# Patient Record
Sex: FEMALE | Race: BLACK OR AFRICAN AMERICAN | Employment: OTHER | URBAN - METROPOLITAN AREA
[De-identification: names, ages, dates, MRNs, and addresses within clinical notes are randomized per-mention and may not be internally consistent; named-entity substitution may affect disease eponyms.]

---

## 2017-12-08 ENCOUNTER — TELEPHONE (OUTPATIENT)
Dept: ORTHOPEDIC SURGERY | Facility: CLINIC | Age: 82
End: 2017-12-08

## 2017-12-08 NOTE — TELEPHONE ENCOUNTER
Patient called stating someone called her earlier telling her that we don't have a referral for her, and she called her PCP and they told her they have sent one over and to call us again. She is requesting a call back from whoever called her today, she does not know the person's name. Please advise patient at [phone].

## 2017-12-11 ENCOUNTER — OFFICE VISIT (OUTPATIENT)
Dept: ORTHOPEDIC SURGERY | Facility: CLINIC | Age: 82
End: 2017-12-11

## 2017-12-11 VITALS
WEIGHT: 223.4 LBS | SYSTOLIC BLOOD PRESSURE: 176 MMHG | HEART RATE: 89 BPM | BODY MASS INDEX: 38.14 KG/M2 | RESPIRATION RATE: 18 BRPM | DIASTOLIC BLOOD PRESSURE: 90 MMHG | TEMPERATURE: 96.2 F | HEIGHT: 64 IN | OXYGEN SATURATION: 98 %

## 2017-12-11 DIAGNOSIS — M17.12 OSTEOARTHRITIS OF LEFT KNEE, UNSPECIFIED OSTEOARTHRITIS TYPE: ICD-10-CM

## 2017-12-11 DIAGNOSIS — M25.562 LEFT KNEE PAIN, UNSPECIFIED CHRONICITY: Primary | ICD-10-CM

## 2017-12-11 RX ORDER — TRIAMCINOLONE ACETONIDE 40 MG/ML
40 INJECTION, SUSPENSION INTRA-ARTICULAR; INTRAMUSCULAR ONCE
Qty: 1 ML | Refills: 0
Start: 2017-12-11 | End: 2017-12-11

## 2017-12-11 NOTE — PROGRESS NOTES
HISTORY OF PRESENT ILLNESS:  Michelle Dowd is a pleasant, 51-year-old, obese,  female who presents to the office. She has not been seen in over three years. She is complaining of bilateral knee pain, left greater than right. She has had cortisone injections in the past and did fair with them. She would like a cortisone injection to the left knee today. She sees Dr. Betty Kong, and a recent note reviewed from December 11, 2017, reveals her care is continuing, generally unremarkable, with exception of her type 2, insulin-dependent diabetes. She is running blood sugars around 110-120 range. There were no laboratory results available for review at the time of her visit with Dr. Cynthia Gordon. The patient does use a single-post cane for ambulation assistance primarily on the right. REVIEW OF SYSTEMS:  No chest pain. She does have exertional dyspnea. No fevers, chills, or night sweats. No rash and no itching. No nausea and no vomiting. She is an insulin-dependent diabetic, as mentioned previously. She has no allergies to Betadine. PHYSICAL EXAM:  She is a healthy-appearing, well-developed, well-nourished, pleasant, 80year-old, obese,  female, atraumatic, normocephalic, alert and oriented times three, sitting on the table comfortably. The left knee reveals crepitation through ranging with the patella tracking midline. She stands with a severe varus deformity. She has active range of motion with pain noted at 105-10° today. There is no calf tenderness or evidence of DVT. Distal sensation is intact fully to the left lower extremity. RADIOGRAPHS:  X-rays, AP standing, and a left knee lateral reveal severe end-staged arthritis, tricompartmental osteoarthritis of the left knee. There is also on AP, severe right knee osteoarthritis both of the medial and lateral joint compartments greater in the medial joint compartment, however. IMPRESSION:      1.  Left knee pain.  2. Decreased range of motion of the left knee secondary to above. 3. Severe tricompartmental end-stage osteoarthritis of the left knee. 4. Right knee osteoarthritis. PROCEDURE:  Using sterile technique, after informed verbal and written consent were obtained and time out performed, 1 cc of Kenalog at 40 mg per ml mixed with 8 mL of Marcaine 0.25% was injected to the left knee using the anterolateral intraarticular approach. There were no complications. The patient tolerated the procedure well. PLAN:   I am currently recommending a low-dose cortisone injection for her left knee pain. Today, we discussed the possibility of a left and/or right total knee replacement. She said she would consider. She is generally healthy with exception of her insulin-dependent diabetes, which is stable. The patient is going to follow back with our office on a prn basis. We discussed the knee replacement surgery that she may benefit from. I am going to go ahead and refer her to Dr. Mare Smith for continuation of care and decision for possible surgery. All her questions were answered to her satisfaction today.

## 2018-06-20 ENCOUNTER — HOSPITAL ENCOUNTER (OUTPATIENT)
Dept: MAMMOGRAPHY | Age: 83
Discharge: HOME | End: 2018-06-20
Attending: INTERNAL MEDICINE
Payer: MEDICARE

## 2018-06-20 DIAGNOSIS — Z12.31 VISIT FOR SCREENING MAMMOGRAM: ICD-10-CM

## 2018-06-20 PROCEDURE — 77063 BREAST TOMOSYNTHESIS BI: CPT

## 2019-03-29 ENCOUNTER — HOSPITAL ENCOUNTER (OUTPATIENT)
Dept: ULTRASOUND IMAGING | Age: 84
Discharge: HOME | End: 2019-03-29
Attending: FAMILY MEDICINE
Payer: MEDICARE

## 2019-03-29 DIAGNOSIS — B18.2 HEPATITIS C CARRIER (HCC): ICD-10-CM

## 2019-03-29 PROCEDURE — 76705 ECHO EXAM OF ABDOMEN: CPT

## 2020-02-06 ENCOUNTER — OFFICE VISIT (OUTPATIENT)
Dept: ORTHOPEDIC SURGERY | Facility: CLINIC | Age: 85
End: 2020-02-06

## 2020-02-06 VITALS
SYSTOLIC BLOOD PRESSURE: 169 MMHG | DIASTOLIC BLOOD PRESSURE: 79 MMHG | WEIGHT: 212.6 LBS | OXYGEN SATURATION: 100 % | RESPIRATION RATE: 18 BRPM | TEMPERATURE: 97.1 F | HEIGHT: 64 IN | BODY MASS INDEX: 36.29 KG/M2 | HEART RATE: 60 BPM

## 2020-02-06 DIAGNOSIS — M17.12 PRIMARY OSTEOARTHRITIS OF LEFT KNEE: ICD-10-CM

## 2020-02-06 DIAGNOSIS — M25.562 PAIN IN BOTH KNEES, UNSPECIFIED CHRONICITY: Primary | ICD-10-CM

## 2020-02-06 DIAGNOSIS — M25.561 PAIN IN BOTH KNEES, UNSPECIFIED CHRONICITY: Primary | ICD-10-CM

## 2020-02-06 PROBLEM — E66.01 SEVERE OBESITY (HCC): Status: ACTIVE | Noted: 2020-02-06

## 2020-02-06 RX ORDER — TRIAMCINOLONE ACETONIDE 40 MG/ML
40 INJECTION, SUSPENSION INTRA-ARTICULAR; INTRAMUSCULAR ONCE
Qty: 1 ML | Refills: 0
Start: 2020-02-06 | End: 2020-02-06

## 2020-02-06 NOTE — PROGRESS NOTES
HISTORY OF PRESENT ILLNESS:  Mynor Busby is a pleasant, 80-year-old, obese,  female who presents to the office. She has not been seen in over two years. She is complaining of bilateral knee pain, left greater than right. She has had cortisone injections in the past and did fair with them. She would like a cortisone injection to the left knee today. .  The patient does use a single-post cane for ambulation assistance primarily on the right. REVIEW OF SYSTEMS:  No chest pain. She does have exertional dyspnea. No fevers, chills, or night sweats. No rash and no itching. No nausea and no vomiting. She is an insulin-dependent diabetic, as mentioned previously. She has no allergies to Betadine, but is allergic to IVP dye. PHYSICAL EXAM:  She is a healthy-appearing, well-developed, well-nourished, pleasant, 80year-old, obese,  female, atraumatic, normocephalic, alert and oriented times three, sitting on the table comfortably. The left knee reveals crepitation through ranging with the patella tracking midline. She stands with a severe varus deformity. She has active range of motion with pain noted at 105-10° today. Trace effusion There is no calf tenderness or evidence of DVT. Distal sensation is intact fully to the left lower extremity. RADIOGRAPHS:  X-rays, AP standing, and a left knee lateral reveal severe end-staged arthritis, tricompartmental osteoarthritis of the left knee. There is also on AP, severe right knee osteoarthritis both of the medial and lateral joint compartments greater in the medial joint compartment, however. IMPRESSION:   
 
1. Left knee pain with trace effusion 2. Decreased range of motion of the left knee secondary to above. 3. Severe tricompartmental end-stage osteoarthritis of the left knee. 4. Right knee osteoarthritis.   
 
PROCEDURE:  Using sterile technique, after informed verbal and written consent were obtained and time out performed, 1 cc of Kenalog at 40 mg per ml mixed with 8 mL of Marcaine 0.25% was injected to the left knee using the anterolateral intraarticular approach. There were no complications. The patient tolerated the procedure well. Please Note: The above patient was treated with the assistance of the [employer] Ultrasound device. Image(s) captured, saved, printed, and copied to chart. PLAN:   I am currently recommending a low-dose cortisone injection for her left knee pain. Today, we discussed the possibility of a left and/or right total knee replacement. She said she would consider. She is generally healthy with exception of her insulin-dependent diabetes, which is stable and her BMI is 36.49  The patient is going to follow back with our office on a prn basis. We discussed the knee replacement surgery that she may benefit from. I am going to go ahead and refer her to Dr. Kaden Goldstein for continuation of care and decision for possible surgery. All her questions were answered to her satisfaction today.

## 2020-03-02 ENCOUNTER — TELEPHONE (OUTPATIENT)
Dept: ORTHOPEDIC SURGERY | Facility: CLINIC | Age: 85
End: 2020-03-02

## 2020-03-02 NOTE — TELEPHONE ENCOUNTER
Patient called stating that she was seen on 2/6/2020 and received an injection but it did not last as long. She was unsure of if she needed to come in again for another injection.  Please advise patient at [phone]

## 2020-03-03 NOTE — TELEPHONE ENCOUNTER
Pt has only seen Ameya--per Kate Howell have pt come in this week to see him--spoke with pt and --pt to come in at 1:00 at [facility] location

## 2020-03-06 ENCOUNTER — OFFICE VISIT (OUTPATIENT)
Dept: ORTHOPEDIC SURGERY | Facility: CLINIC | Age: 85
End: 2020-03-06

## 2020-03-06 VITALS
SYSTOLIC BLOOD PRESSURE: 216 MMHG | TEMPERATURE: 97 F | WEIGHT: 214.8 LBS | OXYGEN SATURATION: 99 % | DIASTOLIC BLOOD PRESSURE: 98 MMHG | RESPIRATION RATE: 16 BRPM | BODY MASS INDEX: 36.67 KG/M2 | HEART RATE: 75 BPM | HEIGHT: 64 IN

## 2020-03-06 DIAGNOSIS — I10 MALIGNANT HYPERTENSION: Primary | ICD-10-CM

## 2020-03-06 NOTE — PROGRESS NOTES
1. Have you been to the ER, urgent care clinic since your last visit? Hospitalized since your last visit? No    2. Have you seen or consulted any other health care providers outside of the [address] [place] [address] since your last visit? Include any pap smears or colon screening.  No

## 2021-01-01 ENCOUNTER — APPOINTMENT (OUTPATIENT)
Dept: VASCULAR SURGERY | Age: 86
DRG: 291 | End: 2021-01-01
Attending: INTERNAL MEDICINE
Payer: MEDICARE

## 2021-01-01 ENCOUNTER — APPOINTMENT (OUTPATIENT)
Dept: CT IMAGING | Age: 86
End: 2021-01-01
Attending: EMERGENCY MEDICINE
Payer: MEDICARE

## 2021-01-01 ENCOUNTER — PATIENT OUTREACH (OUTPATIENT)
Dept: CASE MANAGEMENT | Age: 86
End: 2021-01-01

## 2021-01-01 ENCOUNTER — HOME CARE VISIT (OUTPATIENT)
Dept: HOSPICE | Facility: HOSPICE | Age: 86
End: 2021-01-01
Payer: MEDICARE

## 2021-01-01 ENCOUNTER — APPOINTMENT (OUTPATIENT)
Dept: GENERAL RADIOLOGY | Age: 86
End: 2021-01-01
Attending: EMERGENCY MEDICINE
Payer: MEDICARE

## 2021-01-01 ENCOUNTER — APPOINTMENT (OUTPATIENT)
Dept: NON INVASIVE DIAGNOSTICS | Age: 86
DRG: 308 | End: 2021-01-01
Attending: PHYSICIAN ASSISTANT
Payer: MEDICARE

## 2021-01-01 ENCOUNTER — HOSPITAL ENCOUNTER (OUTPATIENT)
Dept: LAB | Age: 86
Discharge: HOME | End: 2021-09-01

## 2021-01-01 ENCOUNTER — APPOINTMENT (OUTPATIENT)
Dept: GENERAL RADIOLOGY | Age: 86
DRG: 308 | End: 2021-01-01
Attending: STUDENT IN AN ORGANIZED HEALTH CARE EDUCATION/TRAINING PROGRAM
Payer: MEDICARE

## 2021-01-01 ENCOUNTER — OFFICE VISIT (OUTPATIENT)
Dept: CARDIOLOGY CLINIC | Age: 86
End: 2021-01-01
Payer: MEDICARE

## 2021-01-01 ENCOUNTER — HOSPITAL ENCOUNTER (OUTPATIENT)
Dept: LAB | Age: 86
Discharge: HOME | End: 2021-07-14
Payer: MEDICARE

## 2021-01-01 ENCOUNTER — APPOINTMENT (OUTPATIENT)
Dept: CT IMAGING | Age: 86
End: 2021-01-01
Attending: PHYSICIAN ASSISTANT
Payer: MEDICARE

## 2021-01-01 ENCOUNTER — HOSPITAL ENCOUNTER (OUTPATIENT)
Dept: LAB | Age: 86
Discharge: HOME | DRG: 308 | End: 2021-09-29
Payer: MEDICARE

## 2021-01-01 ENCOUNTER — APPOINTMENT (OUTPATIENT)
Dept: ULTRASOUND IMAGING | Age: 86
DRG: 291 | End: 2021-01-01
Attending: INTERNAL MEDICINE
Payer: MEDICARE

## 2021-01-01 ENCOUNTER — HOSPITAL ENCOUNTER (OUTPATIENT)
Dept: LAB | Age: 86
Discharge: HOME | End: 2021-08-20

## 2021-01-01 ENCOUNTER — HOSPITAL ENCOUNTER (INPATIENT)
Age: 86
LOS: 10 days | Discharge: NURSING, REHAB OR LONG-TERM CARE | DRG: 291 | End: 2021-07-13
Attending: EMERGENCY MEDICINE | Admitting: INTERNAL MEDICINE
Payer: MEDICARE

## 2021-01-01 ENCOUNTER — HOSPITAL ENCOUNTER (OUTPATIENT)
Dept: LAB | Age: 86
Discharge: HOME | End: 2021-09-15

## 2021-01-01 ENCOUNTER — HOME CARE VISIT (OUTPATIENT)
Dept: SCHEDULING | Facility: HOME HEALTH | Age: 86
End: 2021-01-01
Payer: MEDICARE

## 2021-01-01 ENCOUNTER — HOSPITAL ENCOUNTER (INPATIENT)
Age: 86
LOS: 7 days | Discharge: HOSPICE | DRG: 308 | End: 2021-10-06
Attending: STUDENT IN AN ORGANIZED HEALTH CARE EDUCATION/TRAINING PROGRAM | Admitting: EMERGENCY MEDICINE
Payer: MEDICARE

## 2021-01-01 ENCOUNTER — HOSPITAL ENCOUNTER (OUTPATIENT)
Dept: LAB | Age: 86
Discharge: HOME | End: 2021-09-21
Payer: MEDICARE

## 2021-01-01 ENCOUNTER — APPOINTMENT (OUTPATIENT)
Dept: GENERAL RADIOLOGY | Age: 86
DRG: 291 | End: 2021-01-01
Attending: EMERGENCY MEDICINE
Payer: MEDICARE

## 2021-01-01 ENCOUNTER — HOSPITAL ENCOUNTER (OUTPATIENT)
Dept: LAB | Age: 86
Discharge: HOME | End: 2021-08-25

## 2021-01-01 ENCOUNTER — HOSPITAL ENCOUNTER (OUTPATIENT)
Dept: LAB | Age: 86
Discharge: HOME | End: 2021-09-22

## 2021-01-01 ENCOUNTER — APPOINTMENT (OUTPATIENT)
Dept: CT IMAGING | Age: 86
DRG: 308 | End: 2021-01-01
Attending: STUDENT IN AN ORGANIZED HEALTH CARE EDUCATION/TRAINING PROGRAM
Payer: MEDICARE

## 2021-01-01 ENCOUNTER — HOSPITAL ENCOUNTER (OUTPATIENT)
Dept: LAB | Age: 86
Discharge: HOME | End: 2021-08-04
Payer: MEDICARE

## 2021-01-01 ENCOUNTER — HOSPICE ADMISSION (OUTPATIENT)
Dept: HOSPICE | Facility: HOSPICE | Age: 86
End: 2021-01-01
Payer: MEDICARE

## 2021-01-01 ENCOUNTER — HOSPITAL ENCOUNTER (OUTPATIENT)
Dept: LAB | Age: 86
Discharge: HOME | End: 2021-08-13

## 2021-01-01 ENCOUNTER — HOSPITAL ENCOUNTER (EMERGENCY)
Age: 86
Discharge: HOME | End: 2021-02-11
Attending: EMERGENCY MEDICINE
Payer: MEDICARE

## 2021-01-01 ENCOUNTER — HOSPITAL ENCOUNTER (OUTPATIENT)
Dept: LAB | Age: 86
Discharge: HOME | End: 2021-08-07

## 2021-01-01 ENCOUNTER — APPOINTMENT (OUTPATIENT)
Dept: NON INVASIVE DIAGNOSTICS | Age: 86
DRG: 291 | End: 2021-01-01
Attending: INTERNAL MEDICINE
Payer: MEDICARE

## 2021-01-01 ENCOUNTER — HOSPITAL ENCOUNTER (OUTPATIENT)
Dept: LAB | Age: 86
Discharge: HOME | End: 2021-09-08

## 2021-01-01 ENCOUNTER — APPOINTMENT (OUTPATIENT)
Dept: VASCULAR SURGERY | Age: 86
End: 2021-01-01
Attending: EMERGENCY MEDICINE
Payer: MEDICARE

## 2021-01-01 ENCOUNTER — HOSPITAL ENCOUNTER (EMERGENCY)
Age: 86
Discharge: HOME | End: 2021-06-29
Attending: EMERGENCY MEDICINE
Payer: MEDICARE

## 2021-01-01 ENCOUNTER — HOSPITAL ENCOUNTER (EMERGENCY)
Age: 86
Discharge: HOME | End: 2021-06-25
Attending: EMERGENCY MEDICINE
Payer: MEDICARE

## 2021-01-01 ENCOUNTER — APPOINTMENT (OUTPATIENT)
Dept: CT IMAGING | Age: 86
DRG: 308 | End: 2021-01-01
Attending: EMERGENCY MEDICINE
Payer: MEDICARE

## 2021-01-01 VITALS
OXYGEN SATURATION: 98 % | RESPIRATION RATE: 24 BRPM | HEIGHT: 64 IN | DIASTOLIC BLOOD PRESSURE: 138 MMHG | WEIGHT: 173 LBS | HEART RATE: 76 BPM | TEMPERATURE: 96.5 F | SYSTOLIC BLOOD PRESSURE: 216 MMHG | BODY MASS INDEX: 29.53 KG/M2

## 2021-01-01 VITALS
HEIGHT: 64 IN | RESPIRATION RATE: 16 BRPM | BODY MASS INDEX: 32.27 KG/M2 | DIASTOLIC BLOOD PRESSURE: 83 MMHG | OXYGEN SATURATION: 99 % | TEMPERATURE: 98.4 F | HEART RATE: 109 BPM | WEIGHT: 189 LBS | SYSTOLIC BLOOD PRESSURE: 147 MMHG

## 2021-01-01 VITALS
RESPIRATION RATE: 16 BRPM | HEIGHT: 64 IN | TEMPERATURE: 98.1 F | WEIGHT: 195 LBS | HEART RATE: 85 BPM | OXYGEN SATURATION: 99 % | BODY MASS INDEX: 33.29 KG/M2 | DIASTOLIC BLOOD PRESSURE: 136 MMHG | SYSTOLIC BLOOD PRESSURE: 185 MMHG

## 2021-01-01 VITALS — HEART RATE: 89 BPM | OXYGEN SATURATION: 100 %

## 2021-01-01 VITALS
WEIGHT: 173 LBS | HEART RATE: 80 BPM | HEIGHT: 64 IN | TEMPERATURE: 97.8 F | RESPIRATION RATE: 18 BRPM | OXYGEN SATURATION: 98 % | BODY MASS INDEX: 29.53 KG/M2 | DIASTOLIC BLOOD PRESSURE: 75 MMHG | SYSTOLIC BLOOD PRESSURE: 118 MMHG

## 2021-01-01 VITALS
SYSTOLIC BLOOD PRESSURE: 112 MMHG | DIASTOLIC BLOOD PRESSURE: 60 MMHG | HEART RATE: 79 BPM | OXYGEN SATURATION: 97 % | BODY MASS INDEX: 29.7 KG/M2 | HEIGHT: 64 IN

## 2021-01-01 VITALS
DIASTOLIC BLOOD PRESSURE: 76 MMHG | SYSTOLIC BLOOD PRESSURE: 128 MMHG | HEART RATE: 96 BPM | BODY MASS INDEX: 32.27 KG/M2 | TEMPERATURE: 98.2 F | OXYGEN SATURATION: 98 % | HEIGHT: 64 IN | RESPIRATION RATE: 16 BRPM | WEIGHT: 189 LBS

## 2021-01-01 VITALS
SYSTOLIC BLOOD PRESSURE: 207 MMHG | TEMPERATURE: 97 F | DIASTOLIC BLOOD PRESSURE: 125 MMHG | OXYGEN SATURATION: 99 % | RESPIRATION RATE: 16 BRPM | HEART RATE: 83 BPM

## 2021-01-01 VITALS
HEART RATE: 85 BPM | OXYGEN SATURATION: 95 % | SYSTOLIC BLOOD PRESSURE: 145 MMHG | RESPIRATION RATE: 16 BRPM | TEMPERATURE: 98.1 F | DIASTOLIC BLOOD PRESSURE: 83 MMHG

## 2021-01-01 VITALS — TEMPERATURE: 97.4 F | OXYGEN SATURATION: 100 % | HEART RATE: 88 BPM | RESPIRATION RATE: 18 BRPM

## 2021-01-01 DIAGNOSIS — N39.41 URGE INCONTINENCE: ICD-10-CM

## 2021-01-01 DIAGNOSIS — R79.89 ELEVATED SERUM CREATININE: ICD-10-CM

## 2021-01-01 DIAGNOSIS — I10 ESSENTIAL HYPERTENSION WITH GOAL BLOOD PRESSURE LESS THAN 140/90: Primary | ICD-10-CM

## 2021-01-01 DIAGNOSIS — Z71.89 GOALS OF CARE, COUNSELING/DISCUSSION: ICD-10-CM

## 2021-01-01 DIAGNOSIS — I16.0 HYPERTENSIVE URGENCY: ICD-10-CM

## 2021-01-01 DIAGNOSIS — R00.1 BRADYCARDIA: ICD-10-CM

## 2021-01-01 DIAGNOSIS — A41.9 SEPSIS WITH ENCEPHALOPATHY WITHOUT SEPTIC SHOCK, DUE TO UNSPECIFIED ORGANISM (HCC): Primary | ICD-10-CM

## 2021-01-01 DIAGNOSIS — E66.01 SEVERE OBESITY (HCC): ICD-10-CM

## 2021-01-01 DIAGNOSIS — M17.12 OSTEOARTHRITIS OF LEFT KNEE, UNSPECIFIED OSTEOARTHRITIS TYPE: Primary | ICD-10-CM

## 2021-01-01 DIAGNOSIS — R53.81 DEBILITY: ICD-10-CM

## 2021-01-01 DIAGNOSIS — S09.90XA CLOSED HEAD INJURY, INITIAL ENCOUNTER: Primary | ICD-10-CM

## 2021-01-01 DIAGNOSIS — E11.42 DM TYPE 2 WITH DIABETIC PERIPHERAL NEUROPATHY (HCC): ICD-10-CM

## 2021-01-01 DIAGNOSIS — R41.82 ALTERED MENTAL STATUS, UNSPECIFIED ALTERED MENTAL STATUS TYPE: ICD-10-CM

## 2021-01-01 DIAGNOSIS — Z51.5 COMFORT MEASURES ONLY STATUS: ICD-10-CM

## 2021-01-01 DIAGNOSIS — G93.40 SEPSIS WITH ENCEPHALOPATHY WITHOUT SEPTIC SHOCK, DUE TO UNSPECIFIED ORGANISM (HCC): Primary | ICD-10-CM

## 2021-01-01 DIAGNOSIS — N28.1 KIDNEY CYSTS: ICD-10-CM

## 2021-01-01 DIAGNOSIS — K59.00 CONSTIPATION, UNSPECIFIED CONSTIPATION TYPE: Primary | ICD-10-CM

## 2021-01-01 DIAGNOSIS — R40.0 SOMNOLENCE: ICD-10-CM

## 2021-01-01 DIAGNOSIS — R65.20 SEPSIS WITH ENCEPHALOPATHY WITHOUT SEPTIC SHOCK, DUE TO UNSPECIFIED ORGANISM (HCC): Primary | ICD-10-CM

## 2021-01-01 DIAGNOSIS — I10 HYPERTENSION, UNSPECIFIED TYPE: ICD-10-CM

## 2021-01-01 DIAGNOSIS — M25.562 ACUTE PAIN OF LEFT KNEE: ICD-10-CM

## 2021-01-01 DIAGNOSIS — I10 HTN (HYPERTENSION), MALIGNANT: Primary | ICD-10-CM

## 2021-01-01 DIAGNOSIS — T68.XXXA HYPOTHERMIA, INITIAL ENCOUNTER: ICD-10-CM

## 2021-01-01 LAB
ABDOMINAL PROX AORTA VEL: 83 CM/S
ACTH PLAS-MCNC: 4.9 PG/ML (ref 7.2–63.3)
ALBUMIN SERPL-MCNC: 2.6 G/DL (ref 3.4–5)
ALBUMIN SERPL-MCNC: 2.8 G/DL (ref 3.4–5)
ALBUMIN SERPL-MCNC: 3.2 G/DL (ref 3.4–5)
ALBUMIN/GLOB SERPL: 0.6 {RATIO} (ref 0.8–1.7)
ALBUMIN/GLOB SERPL: 0.6 {RATIO} (ref 0.8–1.7)
ALBUMIN/GLOB SERPL: 0.7 {RATIO} (ref 0.8–1.7)
ALBUMIN/GLOB SERPL: 0.8 {RATIO} (ref 0.8–1.7)
ALP SERPL-CCNC: 115 U/L (ref 45–117)
ALP SERPL-CCNC: 64 U/L (ref 45–117)
ALP SERPL-CCNC: 77 U/L (ref 45–117)
ALP SERPL-CCNC: 81 U/L (ref 45–117)
ALP SERPL-CCNC: 83 U/L (ref 45–117)
ALP SERPL-CCNC: 86 U/L (ref 45–117)
ALP SERPL-CCNC: 93 U/L (ref 45–117)
ALT SERPL-CCNC: 18 U/L (ref 13–56)
ALT SERPL-CCNC: 18 U/L (ref 13–56)
ALT SERPL-CCNC: 21 U/L (ref 13–56)
ALT SERPL-CCNC: 27 U/L (ref 13–56)
AMMONIA PLAS-SCNC: 21 UMOL/L (ref 11–32)
AMMONIA PLAS-SCNC: <10 UMOL/L (ref 11–32)
AMPHET UR QL SCN: NEGATIVE
ANION GAP BLD CALC-SCNC: 6 MMOL/L (ref 10–20)
ANION GAP BLD CALC-SCNC: 8 MMOL/L (ref 10–20)
ANION GAP SERPL CALC-SCNC: 3 MMOL/L (ref 3–18)
ANION GAP SERPL CALC-SCNC: 4 MMOL/L (ref 3–18)
ANION GAP SERPL CALC-SCNC: 5 MMOL/L (ref 3–18)
ANION GAP SERPL CALC-SCNC: 6 MMOL/L (ref 3–18)
ANION GAP SERPL CALC-SCNC: 7 MMOL/L (ref 3–18)
ANION GAP SERPL CALC-SCNC: 8 MMOL/L (ref 3–18)
ANION GAP SERPL CALC-SCNC: 9 MMOL/L (ref 3–18)
ANION GAP SERPL CALC-SCNC: 9 MMOL/L (ref 3–18)
APPEARANCE UR: ABNORMAL
APPEARANCE UR: CLEAR
ARTERIAL PATENCY WRIST A: POSITIVE
AST SERPL-CCNC: 18 U/L (ref 10–38)
AST SERPL-CCNC: 19 U/L (ref 10–38)
AST SERPL-CCNC: 19 U/L (ref 10–38)
AST SERPL-CCNC: 20 U/L (ref 10–38)
AST SERPL-CCNC: 22 U/L (ref 10–38)
ATRIAL RATE: 69 BPM
ATRIAL RATE: 78 BPM
ATRIAL RATE: 80 BPM
BACTERIA SPEC CULT: ABNORMAL
BACTERIA SPEC CULT: ABNORMAL
BACTERIA SPEC CULT: NORMAL
BACTERIA URNS QL MICRO: ABNORMAL /HPF
BARBITURATES UR QL SCN: NEGATIVE
BASE DEFICIT BLD-SCNC: 0.4 MMOL/L
BASE DEFICIT BLD-SCNC: 1.8 MMOL/L
BASOPHILS # BLD: 0 K/UL (ref 0–0.1)
BASOPHILS NFR BLD: 0 % (ref 0–2)
BASOPHILS NFR BLD: 1 % (ref 0–2)
BASOPHILS NFR BLD: 1 % (ref 0–2)
BDY SITE: ABNORMAL
BENZODIAZ UR QL: NEGATIVE
BILIRUB SERPL-MCNC: 0.3 MG/DL (ref 0.2–1)
BILIRUB SERPL-MCNC: 0.3 MG/DL (ref 0.2–1)
BILIRUB SERPL-MCNC: 0.4 MG/DL (ref 0.2–1)
BILIRUB SERPL-MCNC: 0.4 MG/DL (ref 0.2–1)
BILIRUB SERPL-MCNC: 0.5 MG/DL (ref 0.2–1)
BILIRUB SERPL-MCNC: 0.5 MG/DL (ref 0.2–1)
BILIRUB SERPL-MCNC: 0.6 MG/DL (ref 0.2–1)
BILIRUB UR QL: NEGATIVE
BNP SERPL-MCNC: 2329 PG/ML (ref 0–1800)
BNP SERPL-MCNC: 470 PG/ML (ref 0–1800)
BNP SERPL-MCNC: 526 PG/ML (ref 0–1800)
BODY TEMPERATURE: 97.8
BUN SERPL-MCNC: 20 MG/DL (ref 7–18)
BUN SERPL-MCNC: 21 MG/DL (ref 7–18)
BUN SERPL-MCNC: 21 MG/DL (ref 7–18)
BUN SERPL-MCNC: 23 MG/DL (ref 7–18)
BUN SERPL-MCNC: 23 MG/DL (ref 7–18)
BUN SERPL-MCNC: 24 MG/DL (ref 7–18)
BUN SERPL-MCNC: 25 MG/DL (ref 7–18)
BUN SERPL-MCNC: 26 MG/DL (ref 7–18)
BUN SERPL-MCNC: 27 MG/DL (ref 7–18)
BUN SERPL-MCNC: 30 MG/DL (ref 7–18)
BUN SERPL-MCNC: 31 MG/DL (ref 7–18)
BUN SERPL-MCNC: 35 MG/DL (ref 7–18)
BUN SERPL-MCNC: 35 MG/DL (ref 7–18)
BUN SERPL-MCNC: 36 MG/DL (ref 7–18)
BUN SERPL-MCNC: 41 MG/DL (ref 7–18)
BUN SERPL-MCNC: 43 MG/DL (ref 7–18)
BUN SERPL-MCNC: 45 MG/DL (ref 7–18)
BUN SERPL-MCNC: 45 MG/DL (ref 7–18)
BUN/CREAT SERPL: 13 (ref 12–20)
BUN/CREAT SERPL: 14 (ref 12–20)
BUN/CREAT SERPL: 15 (ref 12–20)
BUN/CREAT SERPL: 16 (ref 12–20)
BUN/CREAT SERPL: 16 (ref 12–20)
BUN/CREAT SERPL: 17 (ref 12–20)
BUN/CREAT SERPL: 19 (ref 12–20)
BUN/CREAT SERPL: 19 (ref 12–20)
BUN/CREAT SERPL: 20 (ref 12–20)
BUN/CREAT SERPL: 21 (ref 12–20)
BUN/CREAT SERPL: 22 (ref 12–20)
BUN/CREAT SERPL: 23 (ref 12–20)
CA-I BLD-MCNC: 1.26 MMOL/L (ref 1.12–1.32)
CA-I BLD-MCNC: 1.29 MMOL/L (ref 1.12–1.32)
CALCIUM SERPL-MCNC: 8.1 MG/DL (ref 8.5–10.1)
CALCIUM SERPL-MCNC: 8.2 MG/DL (ref 8.5–10.1)
CALCIUM SERPL-MCNC: 8.3 MG/DL (ref 8.5–10.1)
CALCIUM SERPL-MCNC: 8.3 MG/DL (ref 8.5–10.1)
CALCIUM SERPL-MCNC: 8.4 MG/DL (ref 8.5–10.1)
CALCIUM SERPL-MCNC: 8.4 MG/DL (ref 8.5–10.1)
CALCIUM SERPL-MCNC: 8.6 MG/DL (ref 8.5–10.1)
CALCIUM SERPL-MCNC: 8.7 MG/DL (ref 8.5–10.1)
CALCIUM SERPL-MCNC: 8.7 MG/DL (ref 8.5–10.1)
CALCIUM SERPL-MCNC: 8.8 MG/DL (ref 8.5–10.1)
CALCIUM SERPL-MCNC: 8.8 MG/DL (ref 8.5–10.1)
CALCIUM SERPL-MCNC: 8.9 MG/DL (ref 8.5–10.1)
CALCIUM SERPL-MCNC: 9.1 MG/DL (ref 8.5–10.1)
CALCIUM SERPL-MCNC: 9.3 MG/DL (ref 8.5–10.1)
CALCULATED P AXIS, ECG09: 61 DEGREES
CALCULATED P AXIS, ECG09: 75 DEGREES
CALCULATED P AXIS, ECG09: 77 DEGREES
CALCULATED R AXIS, ECG10: -18 DEGREES
CALCULATED R AXIS, ECG10: -24 DEGREES
CALCULATED R AXIS, ECG10: -28 DEGREES
CALCULATED T AXIS, ECG11: 67 DEGREES
CALCULATED T AXIS, ECG11: 77 DEGREES
CALCULATED T AXIS, ECG11: 98 DEGREES
CANNABINOIDS UR QL SCN: NEGATIVE
CC UR VC: ABNORMAL
CHLORIDE BLD-SCNC: 112 MMOL/L (ref 98–107)
CHLORIDE BLD-SCNC: 114 MMOL/L (ref 98–107)
CHLORIDE SERPL-SCNC: 103 MMOL/L (ref 100–111)
CHLORIDE SERPL-SCNC: 105 MMOL/L (ref 100–111)
CHLORIDE SERPL-SCNC: 106 MMOL/L (ref 100–111)
CHLORIDE SERPL-SCNC: 107 MMOL/L (ref 100–111)
CHLORIDE SERPL-SCNC: 108 MMOL/L (ref 100–111)
CHLORIDE SERPL-SCNC: 109 MMOL/L (ref 100–111)
CHLORIDE SERPL-SCNC: 109 MMOL/L (ref 100–111)
CHLORIDE SERPL-SCNC: 112 MMOL/L (ref 100–111)
CHLORIDE SERPL-SCNC: 115 MMOL/L (ref 100–111)
CHLORIDE SERPL-SCNC: 116 MMOL/L (ref 100–111)
CHLORIDE SERPL-SCNC: 116 MMOL/L (ref 100–111)
CHOLEST SERPL-MCNC: 188 MG/DL
CK MB CFR SERPL CALC: 5 % (ref 0–4)
CK MB CFR SERPL CALC: 5.3 % (ref 0–4)
CK MB CFR SERPL CALC: 5.3 % (ref 0–4)
CK MB SERPL-MCNC: 1.6 NG/ML (ref 5–25)
CK MB SERPL-MCNC: 2.1 NG/ML (ref 5–25)
CK MB SERPL-MCNC: 2.3 NG/ML (ref 5–25)
CK SERPL-CCNC: 30 U/L (ref 26–192)
CK SERPL-CCNC: 42 U/L (ref 26–192)
CK SERPL-CCNC: 43 U/L (ref 26–192)
CO2 BLD-SCNC: 24 MMOL/L (ref 19–24)
CO2 BLD-SCNC: 28 MMOL/L (ref 19–24)
CO2 SERPL-SCNC: 18 MMOL/L (ref 21–32)
CO2 SERPL-SCNC: 21 MMOL/L (ref 21–32)
CO2 SERPL-SCNC: 21 MMOL/L (ref 21–32)
CO2 SERPL-SCNC: 22 MMOL/L (ref 21–32)
CO2 SERPL-SCNC: 23 MMOL/L (ref 21–32)
CO2 SERPL-SCNC: 26 MMOL/L (ref 21–32)
CO2 SERPL-SCNC: 26 MMOL/L (ref 21–32)
CO2 SERPL-SCNC: 27 MMOL/L (ref 21–32)
CO2 SERPL-SCNC: 27 MMOL/L (ref 21–32)
CO2 SERPL-SCNC: 28 MMOL/L (ref 21–32)
COCAINE UR QL SCN: NEGATIVE
COLOR UR: YELLOW
CORTIS SERPL-MCNC: 15.1 UG/DL
COVID-19 RAPID TEST, COVR: NOT DETECTED
CREAT BLD-MCNC: 1.63 MG/DL (ref 0.6–1.3)
CREAT BLD-MCNC: 1.71 MG/DL (ref 0.6–1.3)
CREAT SERPL-MCNC: 1.34 MG/DL (ref 0.6–1.3)
CREAT SERPL-MCNC: 1.35 MG/DL (ref 0.6–1.3)
CREAT SERPL-MCNC: 1.39 MG/DL (ref 0.6–1.3)
CREAT SERPL-MCNC: 1.49 MG/DL (ref 0.6–1.3)
CREAT SERPL-MCNC: 1.51 MG/DL (ref 0.6–1.3)
CREAT SERPL-MCNC: 1.53 MG/DL (ref 0.6–1.3)
CREAT SERPL-MCNC: 1.6 MG/DL (ref 0.6–1.3)
CREAT SERPL-MCNC: 1.61 MG/DL (ref 0.6–1.3)
CREAT SERPL-MCNC: 1.62 MG/DL (ref 0.6–1.3)
CREAT SERPL-MCNC: 1.69 MG/DL (ref 0.6–1.3)
CREAT SERPL-MCNC: 1.79 MG/DL (ref 0.6–1.3)
CREAT SERPL-MCNC: 1.84 MG/DL (ref 0.6–1.3)
CREAT SERPL-MCNC: 2.09 MG/DL (ref 0.6–1.3)
CREAT SERPL-MCNC: 2.11 MG/DL (ref 0.6–1.3)
CREAT SERPL-MCNC: 2.32 MG/DL (ref 0.6–1.3)
CREAT SERPL-MCNC: 2.41 MG/DL (ref 0.6–1.3)
CREAT SERPL-MCNC: 2.47 MG/DL (ref 0.6–1.3)
CREAT SERPL-MCNC: 2.51 MG/DL (ref 0.6–1.3)
CRP SERPL-MCNC: 1.2 MG/DL (ref 0–0.3)
DIAGNOSIS, 93000: NORMAL
DIFFERENTIAL METHOD BLD: ABNORMAL
ECHO AO ROOT DIAM: 2.59 CM
ECHO LA AREA 4C: 17.92 CM2
ECHO LA MAJOR AXIS: 3.75 CM
ECHO LA MINOR AXIS: 1.98 CM
ECHO LA VOL 2C: 58.79 ML (ref 22–52)
ECHO LA VOL 4C: 49.21 ML (ref 22–52)
ECHO LA VOL BP: 57.55 ML (ref 22–52)
ECHO LA VOL/BSA BIPLANE: 30.45 ML/M2 (ref 16–28)
ECHO LA VOLUME INDEX A2C: 31.11 ML/M2 (ref 16–28)
ECHO LA VOLUME INDEX A4C: 26.04 ML/M2 (ref 16–28)
ECHO LV INTERNAL DIMENSION DIASTOLIC: 3.25 CM (ref 3.9–5.3)
ECHO LV INTERNAL DIMENSION DIASTOLIC: 4.25 CM (ref 3.9–5.3)
ECHO LV INTERNAL DIMENSION SYSTOLIC: 2.51 CM
ECHO LV INTERNAL DIMENSION SYSTOLIC: 2.99 CM
ECHO LV IVSD: 1.04 CM (ref 0.6–0.9)
ECHO LV IVSD: 1.3 CM (ref 0.6–0.9)
ECHO LV MASS 2D: 158.1 G (ref 67–162)
ECHO LV MASS 2D: 170.5 G (ref 67–162)
ECHO LV MASS INDEX 2D: 83.7 G/M2 (ref 43–95)
ECHO LV MASS INDEX 2D: 92.7 G/M2 (ref 43–95)
ECHO LV POSTERIOR WALL DIASTOLIC: 1.26 CM (ref 0.6–0.9)
ECHO LV POSTERIOR WALL DIASTOLIC: 1.52 CM (ref 0.6–0.9)
ECHO MV A VELOCITY: 63.65 CM/S
ECHO MV E DECELERATION TIME (DT): 142.64 MS
ECHO MV E VELOCITY: 50.59 CM/S
ECHO MV E/A RATIO: 0.79
ECHO TV REGURGITANT MAX VELOCITY: 218.07 CM/S
ECHO TV REGURGITANT PEAK GRADIENT: 19.03 MMHG
EOSINOPHIL # BLD: 0 K/UL (ref 0–0.4)
EOSINOPHIL # BLD: 0.1 K/UL (ref 0–0.4)
EOSINOPHIL # BLD: 0.2 K/UL (ref 0–0.4)
EOSINOPHIL # BLD: 0.2 K/UL (ref 0–0.4)
EOSINOPHIL NFR BLD: 0 % (ref 0–5)
EOSINOPHIL NFR BLD: 1 % (ref 0–5)
EOSINOPHIL NFR BLD: 2 % (ref 0–5)
EOSINOPHIL NFR BLD: 3 % (ref 0–5)
EOSINOPHIL NFR BLD: 3 % (ref 0–5)
EOSINOPHIL NFR BLD: 5 % (ref 0–5)
EPITH CASTS URNS QL MICRO: ABNORMAL /LPF (ref 0–5)
ERYTHROCYTE [DISTWIDTH] IN BLOOD BY AUTOMATED COUNT: 13.4 % (ref 11.6–14.5)
ERYTHROCYTE [DISTWIDTH] IN BLOOD BY AUTOMATED COUNT: 14 % (ref 11.6–14.5)
ERYTHROCYTE [DISTWIDTH] IN BLOOD BY AUTOMATED COUNT: 14.2 % (ref 11.6–14.5)
ERYTHROCYTE [DISTWIDTH] IN BLOOD BY AUTOMATED COUNT: 14.3 % (ref 11.6–14.5)
ERYTHROCYTE [DISTWIDTH] IN BLOOD BY AUTOMATED COUNT: 14.3 % (ref 11.6–14.5)
ERYTHROCYTE [DISTWIDTH] IN BLOOD BY AUTOMATED COUNT: 14.5 % (ref 11.6–14.5)
ERYTHROCYTE [DISTWIDTH] IN BLOOD BY AUTOMATED COUNT: 14.6 % (ref 11.6–14.5)
ERYTHROCYTE [DISTWIDTH] IN BLOOD BY AUTOMATED COUNT: 14.8 % (ref 11.6–14.5)
ERYTHROCYTE [DISTWIDTH] IN BLOOD BY AUTOMATED COUNT: 15.1 % (ref 11.6–14.5)
ERYTHROCYTE [DISTWIDTH] IN BLOOD BY AUTOMATED COUNT: 15.3 % (ref 11.6–14.5)
ERYTHROCYTE [DISTWIDTH] IN BLOOD BY AUTOMATED COUNT: 15.4 % (ref 11.6–14.5)
ERYTHROCYTE [SEDIMENTATION RATE] IN BLOOD: 31 MM/HR (ref 0–30)
EST. AVERAGE GLUCOSE BLD GHB EST-MCNC: 123 MG/DL
EST. AVERAGE GLUCOSE BLD GHB EST-MCNC: 126 MG/DL
GAS FLOW.O2 O2 DELIVERY SYS: ABNORMAL L/MIN
GLOBULIN SER CALC-MCNC: 3.5 G/DL (ref 2–4)
GLOBULIN SER CALC-MCNC: 4.1 G/DL (ref 2–4)
GLOBULIN SER CALC-MCNC: 4.3 G/DL (ref 2–4)
GLOBULIN SER CALC-MCNC: 4.4 G/DL (ref 2–4)
GLOBULIN SER CALC-MCNC: 4.5 G/DL (ref 2–4)
GLOBULIN SER CALC-MCNC: 4.7 G/DL (ref 2–4)
GLOBULIN SER CALC-MCNC: 5.2 G/DL (ref 2–4)
GLUCOSE BLD STRIP.AUTO-MCNC: 102 MG/DL (ref 70–110)
GLUCOSE BLD STRIP.AUTO-MCNC: 104 MG/DL (ref 70–110)
GLUCOSE BLD STRIP.AUTO-MCNC: 105 MG/DL (ref 70–110)
GLUCOSE BLD STRIP.AUTO-MCNC: 106 MG/DL (ref 70–110)
GLUCOSE BLD STRIP.AUTO-MCNC: 106 MG/DL (ref 70–110)
GLUCOSE BLD STRIP.AUTO-MCNC: 108 MG/DL (ref 70–110)
GLUCOSE BLD STRIP.AUTO-MCNC: 112 MG/DL (ref 70–110)
GLUCOSE BLD STRIP.AUTO-MCNC: 113 MG/DL (ref 70–110)
GLUCOSE BLD STRIP.AUTO-MCNC: 113 MG/DL (ref 70–110)
GLUCOSE BLD STRIP.AUTO-MCNC: 116 MG/DL (ref 70–110)
GLUCOSE BLD STRIP.AUTO-MCNC: 118 MG/DL (ref 70–110)
GLUCOSE BLD STRIP.AUTO-MCNC: 120 MG/DL (ref 70–110)
GLUCOSE BLD STRIP.AUTO-MCNC: 120 MG/DL (ref 70–110)
GLUCOSE BLD STRIP.AUTO-MCNC: 124 MG/DL (ref 70–110)
GLUCOSE BLD STRIP.AUTO-MCNC: 125 MG/DL (ref 70–110)
GLUCOSE BLD STRIP.AUTO-MCNC: 126 MG/DL (ref 70–110)
GLUCOSE BLD STRIP.AUTO-MCNC: 127 MG/DL (ref 70–110)
GLUCOSE BLD STRIP.AUTO-MCNC: 128 MG/DL (ref 70–110)
GLUCOSE BLD STRIP.AUTO-MCNC: 130 MG/DL (ref 70–110)
GLUCOSE BLD STRIP.AUTO-MCNC: 130 MG/DL (ref 70–110)
GLUCOSE BLD STRIP.AUTO-MCNC: 131 MG/DL (ref 70–110)
GLUCOSE BLD STRIP.AUTO-MCNC: 132 MG/DL (ref 70–110)
GLUCOSE BLD STRIP.AUTO-MCNC: 135 MG/DL (ref 70–110)
GLUCOSE BLD STRIP.AUTO-MCNC: 135 MG/DL (ref 70–110)
GLUCOSE BLD STRIP.AUTO-MCNC: 136 MG/DL (ref 70–110)
GLUCOSE BLD STRIP.AUTO-MCNC: 137 MG/DL (ref 70–110)
GLUCOSE BLD STRIP.AUTO-MCNC: 138 MG/DL (ref 70–110)
GLUCOSE BLD STRIP.AUTO-MCNC: 138 MG/DL (ref 70–110)
GLUCOSE BLD STRIP.AUTO-MCNC: 141 MG/DL (ref 70–110)
GLUCOSE BLD STRIP.AUTO-MCNC: 142 MG/DL (ref 70–110)
GLUCOSE BLD STRIP.AUTO-MCNC: 142 MG/DL (ref 70–110)
GLUCOSE BLD STRIP.AUTO-MCNC: 143 MG/DL (ref 70–110)
GLUCOSE BLD STRIP.AUTO-MCNC: 144 MG/DL (ref 70–110)
GLUCOSE BLD STRIP.AUTO-MCNC: 144 MG/DL (ref 70–110)
GLUCOSE BLD STRIP.AUTO-MCNC: 145 MG/DL (ref 70–110)
GLUCOSE BLD STRIP.AUTO-MCNC: 146 MG/DL (ref 70–110)
GLUCOSE BLD STRIP.AUTO-MCNC: 147 MG/DL (ref 70–110)
GLUCOSE BLD STRIP.AUTO-MCNC: 148 MG/DL (ref 70–110)
GLUCOSE BLD STRIP.AUTO-MCNC: 149 MG/DL (ref 70–110)
GLUCOSE BLD STRIP.AUTO-MCNC: 153 MG/DL (ref 70–110)
GLUCOSE BLD STRIP.AUTO-MCNC: 156 MG/DL (ref 70–110)
GLUCOSE BLD STRIP.AUTO-MCNC: 157 MG/DL (ref 70–110)
GLUCOSE BLD STRIP.AUTO-MCNC: 157 MG/DL (ref 70–110)
GLUCOSE BLD STRIP.AUTO-MCNC: 159 MG/DL (ref 70–110)
GLUCOSE BLD STRIP.AUTO-MCNC: 164 MG/DL (ref 70–110)
GLUCOSE BLD STRIP.AUTO-MCNC: 166 MG/DL (ref 70–110)
GLUCOSE BLD STRIP.AUTO-MCNC: 169 MG/DL (ref 70–110)
GLUCOSE BLD STRIP.AUTO-MCNC: 174 MG/DL (ref 70–110)
GLUCOSE BLD STRIP.AUTO-MCNC: 176 MG/DL (ref 70–110)
GLUCOSE BLD STRIP.AUTO-MCNC: 178 MG/DL (ref 70–110)
GLUCOSE BLD STRIP.AUTO-MCNC: 196 MG/DL (ref 70–110)
GLUCOSE BLD STRIP.AUTO-MCNC: 70 MG/DL (ref 70–110)
GLUCOSE BLD STRIP.AUTO-MCNC: 70 MG/DL (ref 70–110)
GLUCOSE BLD STRIP.AUTO-MCNC: 79 MG/DL (ref 70–110)
GLUCOSE BLD STRIP.AUTO-MCNC: 90 MG/DL (ref 70–110)
GLUCOSE BLD STRIP.AUTO-MCNC: 93 MG/DL (ref 70–110)
GLUCOSE BLD STRIP.AUTO-MCNC: 96 MG/DL (ref 70–110)
GLUCOSE BLD STRIP.AUTO-MCNC: 97 MG/DL (ref 70–110)
GLUCOSE BLD STRIP.AUTO-MCNC: 99 MG/DL (ref 70–110)
GLUCOSE BLD-MCNC: 79 MG/DL (ref 65–100)
GLUCOSE BLD-MCNC: 96 MG/DL (ref 65–100)
GLUCOSE SERPL-MCNC: 100 MG/DL (ref 74–99)
GLUCOSE SERPL-MCNC: 102 MG/DL (ref 74–99)
GLUCOSE SERPL-MCNC: 116 MG/DL (ref 74–99)
GLUCOSE SERPL-MCNC: 119 MG/DL (ref 74–99)
GLUCOSE SERPL-MCNC: 123 MG/DL (ref 74–99)
GLUCOSE SERPL-MCNC: 124 MG/DL (ref 74–99)
GLUCOSE SERPL-MCNC: 124 MG/DL (ref 74–99)
GLUCOSE SERPL-MCNC: 128 MG/DL (ref 74–99)
GLUCOSE SERPL-MCNC: 145 MG/DL (ref 74–99)
GLUCOSE SERPL-MCNC: 160 MG/DL (ref 74–99)
GLUCOSE SERPL-MCNC: 168 MG/DL (ref 74–99)
GLUCOSE SERPL-MCNC: 185 MG/DL (ref 74–99)
GLUCOSE SERPL-MCNC: 62 MG/DL (ref 74–99)
GLUCOSE SERPL-MCNC: 74 MG/DL (ref 74–99)
GLUCOSE SERPL-MCNC: 90 MG/DL (ref 74–99)
GLUCOSE SERPL-MCNC: 93 MG/DL (ref 74–99)
GLUCOSE SERPL-MCNC: 94 MG/DL (ref 74–99)
GLUCOSE SERPL-MCNC: 99 MG/DL (ref 74–99)
GLUCOSE UR STRIP.AUTO-MCNC: 100 MG/DL
GLUCOSE UR STRIP.AUTO-MCNC: 100 MG/DL
GLUCOSE UR STRIP.AUTO-MCNC: NEGATIVE MG/DL
GLUCOSE UR STRIP.AUTO-MCNC: NEGATIVE MG/DL
GRAM STN SPEC: ABNORMAL
GRAM STN SPEC: ABNORMAL
GRAN CASTS URNS QL MICRO: ABNORMAL /LPF
HBA1C MFR BLD: 5.9 % (ref 4.2–5.6)
HBA1C MFR BLD: 6 % (ref 4.2–5.6)
HCO3 BLD-SCNC: 23.6 MMOL/L (ref 22–26)
HCO3 BLD-SCNC: 27.2 MMOL/L (ref 22–26)
HCT VFR BLD AUTO: 31.2 % (ref 35–45)
HCT VFR BLD AUTO: 32.8 % (ref 35–45)
HCT VFR BLD AUTO: 34.2 % (ref 35–45)
HCT VFR BLD AUTO: 35 % (ref 35–45)
HCT VFR BLD AUTO: 35.2 % (ref 35–45)
HCT VFR BLD AUTO: 36.3 % (ref 35–45)
HCT VFR BLD AUTO: 36.7 % (ref 35–45)
HCT VFR BLD AUTO: 37.3 % (ref 35–45)
HCT VFR BLD AUTO: 37.5 % (ref 35–45)
HCT VFR BLD AUTO: 38.1 % (ref 35–45)
HCT VFR BLD AUTO: 39.2 % (ref 35–45)
HDLC SERPL-MCNC: 49 MG/DL (ref 40–60)
HDLC SERPL: 3.8 {RATIO} (ref 0–5)
HDSCOM,HDSCOM: NORMAL
HGB BLD-MCNC: 10.1 G/DL (ref 12–16)
HGB BLD-MCNC: 10.9 G/DL (ref 12–16)
HGB BLD-MCNC: 11.1 G/DL (ref 12–16)
HGB BLD-MCNC: 11.2 G/DL (ref 12–16)
HGB BLD-MCNC: 11.4 G/DL (ref 12–16)
HGB BLD-MCNC: 11.8 G/DL (ref 12–16)
HGB BLD-MCNC: 12 G/DL (ref 12–16)
HGB BLD-MCNC: 12.3 G/DL (ref 12–16)
HGB BLD-MCNC: 12.5 G/DL (ref 12–16)
HGB BLD-MCNC: 12.8 G/DL (ref 12–16)
HGB BLD-MCNC: 13 G/DL (ref 12–16)
HGB UR QL STRIP: NEGATIVE
HYALINE CASTS URNS QL MICRO: ABNORMAL /LPF (ref 0–2)
INR PPP: 1 (ref 0.8–1.2)
KETONES UR QL STRIP.AUTO: ABNORMAL MG/DL
KETONES UR QL STRIP.AUTO: NEGATIVE MG/DL
LACTATE BLD-SCNC: 0.62 MMOL/L (ref 0.4–2)
LACTATE BLD-SCNC: 0.68 MMOL/L (ref 0.4–2)
LDLC SERPL CALC-MCNC: 113 MG/DL (ref 0–100)
LEFT INTERLOBAR EDV: 8.6 CM/S
LEFT INTERLOBAR PSV: 31.3 CM/S
LEFT INTERLOBAR RI: 0.7
LEFT KIDNEY LENGTH: 10.34 CM
LEFT KIDNEY WIDTH: 5.63 CM
LEFT RENAL DIST DIAS: 8.3 CM/S
LEFT RENAL DIST RAR: 0.75
LEFT RENAL DIST RI: 0.87
LEFT RENAL DIST SYS: 62.6 CM/S
LEFT RENAL LOWER PARENCHYMA MAX: 11.9 CM/S
LEFT RENAL LOWER PARENCHYMA MIN: 5.8 CM/S
LEFT RENAL LOWER PARENCHYMA RI: 0.51
LEFT RENAL MID DIAS: 9.3 CM/S
LEFT RENAL MID RAR: 0.64
LEFT RENAL MID RI: 0.83
LEFT RENAL MID SYS: 53.2 CM/S
LEFT RENAL MIDDLE PARENCHYMA MAX: 10.4 CM/S
LEFT RENAL MIDDLE PARENCHYMA MIN: 4.4 CM/S
LEFT RENAL MIDDLE PARENCHYMA RI: 0.58
LEFT RENAL ORIGIN DIAS: 11.1 CM/S
LEFT RENAL ORIGIN RAR: 0.66
LEFT RENAL ORIGIN RI: 0.8
LEFT RENAL ORIGIN SYS: 54.8 CM/S
LEFT RENAL PROX DIAS: 11.1 CM/S
LEFT RENAL PROX RAR: 0.75
LEFT RENAL PROX RI: 0.82
LEFT RENAL PROX SYS: 62.3 CM/S
LEFT RENAL UPPER PARENCHYMA MAX: 12.6 CM/S
LEFT RENAL UPPER PARENCHYMA MIN: 4 CM/S
LEFT RENAL UPPER PARENCHYMA RI: 0.68
LEUKOCYTE ESTERASE UR QL STRIP.AUTO: NEGATIVE
LIPASE SERPL-CCNC: 91 U/L (ref 73–393)
LIPID PROFILE,FLP: ABNORMAL
LYMPHOCYTES # BLD: 0.9 K/UL (ref 0.9–3.6)
LYMPHOCYTES # BLD: 1.1 K/UL (ref 0.9–3.6)
LYMPHOCYTES # BLD: 1.1 K/UL (ref 0.9–3.6)
LYMPHOCYTES # BLD: 1.3 K/UL (ref 0.9–3.6)
LYMPHOCYTES # BLD: 1.4 K/UL (ref 0.9–3.6)
LYMPHOCYTES # BLD: 1.5 K/UL (ref 0.9–3.6)
LYMPHOCYTES NFR BLD: 13 % (ref 21–52)
LYMPHOCYTES NFR BLD: 18 % (ref 21–52)
LYMPHOCYTES NFR BLD: 19 % (ref 21–52)
LYMPHOCYTES NFR BLD: 20 % (ref 21–52)
LYMPHOCYTES NFR BLD: 21 % (ref 21–52)
LYMPHOCYTES NFR BLD: 21 % (ref 21–52)
LYMPHOCYTES NFR BLD: 22 % (ref 21–52)
LYMPHOCYTES NFR BLD: 28 % (ref 21–52)
MAGNESIUM SERPL-MCNC: 2.1 MG/DL (ref 1.6–2.6)
MAGNESIUM SERPL-MCNC: 2.2 MG/DL (ref 1.6–2.6)
MAGNESIUM SERPL-MCNC: 2.3 MG/DL (ref 1.6–2.6)
MAGNESIUM SERPL-MCNC: 2.6 MG/DL (ref 1.6–2.6)
MCH RBC QN AUTO: 27.8 PG (ref 24–34)
MCH RBC QN AUTO: 27.9 PG (ref 24–34)
MCH RBC QN AUTO: 28.1 PG (ref 24–34)
MCH RBC QN AUTO: 28.2 PG (ref 24–34)
MCH RBC QN AUTO: 28.4 PG (ref 24–34)
MCH RBC QN AUTO: 28.4 PG (ref 24–34)
MCH RBC QN AUTO: 28.5 PG (ref 24–34)
MCH RBC QN AUTO: 28.6 PG (ref 24–34)
MCH RBC QN AUTO: 28.7 PG (ref 24–34)
MCH RBC QN AUTO: 28.8 PG (ref 24–34)
MCH RBC QN AUTO: 29.2 PG (ref 24–34)
MCHC RBC AUTO-ENTMCNC: 31.5 G/DL (ref 31–37)
MCHC RBC AUTO-ENTMCNC: 31.9 G/DL (ref 31–37)
MCHC RBC AUTO-ENTMCNC: 32 G/DL (ref 31–37)
MCHC RBC AUTO-ENTMCNC: 32.4 G/DL (ref 31–37)
MCHC RBC AUTO-ENTMCNC: 32.4 G/DL (ref 31–37)
MCHC RBC AUTO-ENTMCNC: 32.5 G/DL (ref 31–37)
MCHC RBC AUTO-ENTMCNC: 33 G/DL (ref 31–37)
MCHC RBC AUTO-ENTMCNC: 33.2 G/DL (ref 31–37)
MCHC RBC AUTO-ENTMCNC: 33.8 G/DL (ref 31–37)
MCHC RBC AUTO-ENTMCNC: 34.1 G/DL (ref 31–37)
MCHC RBC AUTO-ENTMCNC: 34.1 G/DL (ref 31–37)
MCV RBC AUTO: 83.9 FL (ref 74–97)
MCV RBC AUTO: 84.2 FL (ref 74–97)
MCV RBC AUTO: 86.3 FL (ref 78–100)
MCV RBC AUTO: 86.4 FL (ref 74–97)
MCV RBC AUTO: 86.5 FL (ref 74–97)
MCV RBC AUTO: 86.7 FL (ref 78–100)
MCV RBC AUTO: 87.2 FL (ref 74–97)
MCV RBC AUTO: 87.2 FL (ref 78–100)
MCV RBC AUTO: 87.6 FL (ref 74–97)
MCV RBC AUTO: 88.6 FL (ref 74–97)
MCV RBC AUTO: 88.8 FL (ref 74–97)
METHADONE UR QL: NEGATIVE
MONOCYTES # BLD: 0.2 K/UL (ref 0.05–1.2)
MONOCYTES # BLD: 0.3 K/UL (ref 0.05–1.2)
MONOCYTES # BLD: 0.4 K/UL (ref 0.05–1.2)
MONOCYTES # BLD: 0.5 K/UL (ref 0.05–1.2)
MONOCYTES # BLD: 0.5 K/UL (ref 0.05–1.2)
MONOCYTES # BLD: 0.6 K/UL (ref 0.05–1.2)
MONOCYTES # BLD: 0.7 K/UL (ref 0.05–1.2)
MONOCYTES # BLD: 0.7 K/UL (ref 0.05–1.2)
MONOCYTES NFR BLD: 3 % (ref 3–10)
MONOCYTES NFR BLD: 6 % (ref 3–10)
MONOCYTES NFR BLD: 7 % (ref 3–10)
MONOCYTES NFR BLD: 8 % (ref 3–10)
MONOCYTES NFR BLD: 9 % (ref 3–10)
MUCOUS THREADS URNS QL MICRO: ABNORMAL /LPF
NEUTS SEG # BLD: 2.7 K/UL (ref 1.8–8)
NEUTS SEG # BLD: 3.2 K/UL (ref 1.8–8)
NEUTS SEG # BLD: 3.2 K/UL (ref 1.8–8)
NEUTS SEG # BLD: 4.5 K/UL (ref 1.8–8)
NEUTS SEG # BLD: 4.7 K/UL (ref 1.8–8)
NEUTS SEG # BLD: 5.6 K/UL (ref 1.8–8)
NEUTS SEG # BLD: 5.6 K/UL (ref 1.8–8)
NEUTS SEG # BLD: 6.4 K/UL (ref 1.8–8)
NEUTS SEG NFR BLD: 57 % (ref 40–73)
NEUTS SEG NFR BLD: 66 % (ref 40–73)
NEUTS SEG NFR BLD: 67 % (ref 40–73)
NEUTS SEG NFR BLD: 71 % (ref 40–73)
NEUTS SEG NFR BLD: 72 % (ref 40–73)
NEUTS SEG NFR BLD: 72 % (ref 40–73)
NEUTS SEG NFR BLD: 73 % (ref 40–73)
NEUTS SEG NFR BLD: 76 % (ref 40–73)
NITRITE UR QL STRIP.AUTO: NEGATIVE
OPIATES UR QL: NEGATIVE
P-R INTERVAL, ECG05: 154 MS
P-R INTERVAL, ECG05: 196 MS
P-R INTERVAL, ECG05: 214 MS
PCO2 BLD: 41.4 MMHG (ref 35–45)
PCO2 BLDV: 56.3 MMHG (ref 41–51)
PCP UR QL: NEGATIVE
PH BLD: 7.36 [PH] (ref 7.35–7.45)
PH BLDV: 7.29 [PH] (ref 7.32–7.42)
PH UR STRIP: 5 [PH] (ref 5–8)
PH UR STRIP: 5 [PH] (ref 5–8)
PH UR STRIP: 5.5 [PH] (ref 5–8)
PH UR STRIP: 5.5 [PH] (ref 5–8)
PHOSPHATE SERPL-MCNC: 3.5 MG/DL (ref 2.5–4.9)
PHOSPHATE SERPL-MCNC: 5.1 MG/DL (ref 2.5–4.9)
PLATELET # BLD AUTO: 212 K/UL (ref 135–420)
PLATELET # BLD AUTO: 240 K/UL (ref 135–420)
PLATELET # BLD AUTO: 241 K/UL (ref 135–420)
PLATELET # BLD AUTO: 245 K/UL (ref 135–420)
PLATELET # BLD AUTO: 246 K/UL (ref 135–420)
PLATELET # BLD AUTO: 263 K/UL (ref 135–420)
PLATELET # BLD AUTO: 264 K/UL (ref 135–420)
PLATELET # BLD AUTO: 275 K/UL (ref 135–420)
PLATELET # BLD AUTO: 276 K/UL (ref 135–420)
PLATELET # BLD AUTO: 312 K/UL (ref 135–420)
PLATELET # BLD AUTO: 322 K/UL (ref 135–420)
PMV BLD AUTO: 10.2 FL (ref 9.2–11.8)
PMV BLD AUTO: 10.3 FL (ref 9.2–11.8)
PMV BLD AUTO: 10.6 FL (ref 9.2–11.8)
PMV BLD AUTO: 9.1 FL (ref 9.2–11.8)
PMV BLD AUTO: 9.7 FL (ref 9.2–11.8)
PMV BLD AUTO: 9.8 FL (ref 9.2–11.8)
PMV BLD AUTO: 9.8 FL (ref 9.2–11.8)
PMV BLD AUTO: 9.9 FL (ref 9.2–11.8)
PO2 BLD: 99 MMHG (ref 80–100)
PO2 BLDV: 21 MMHG (ref 25–40)
POTASSIUM BLD-SCNC: 3.8 MMOL/L (ref 3.5–5.1)
POTASSIUM BLD-SCNC: 4.3 MMOL/L (ref 3.5–5.1)
POTASSIUM SERPL-SCNC: 3.7 MMOL/L (ref 3.5–5.5)
POTASSIUM SERPL-SCNC: 3.9 MMOL/L (ref 3.5–5.5)
POTASSIUM SERPL-SCNC: 3.9 MMOL/L (ref 3.5–5.5)
POTASSIUM SERPL-SCNC: 4 MMOL/L (ref 3.5–5.5)
POTASSIUM SERPL-SCNC: 4.1 MMOL/L (ref 3.5–5.5)
POTASSIUM SERPL-SCNC: 4.3 MMOL/L (ref 3.5–5.5)
POTASSIUM SERPL-SCNC: 4.4 MMOL/L (ref 3.5–5.5)
POTASSIUM SERPL-SCNC: 4.5 MMOL/L (ref 3.5–5.5)
PROCALCITONIN SERPL-MCNC: <0.05 NG/ML
PROT SERPL-MCNC: 6.3 G/DL (ref 6.4–8.2)
PROT SERPL-MCNC: 6.7 G/DL (ref 6.4–8.2)
PROT SERPL-MCNC: 7.1 G/DL (ref 6.4–8.2)
PROT SERPL-MCNC: 7.6 G/DL (ref 6.4–8.2)
PROT SERPL-MCNC: 7.7 G/DL (ref 6.4–8.2)
PROT SERPL-MCNC: 7.9 G/DL (ref 6.4–8.2)
PROT SERPL-MCNC: 8.4 G/DL (ref 6.4–8.2)
PROT UR STRIP-MCNC: 100 MG/DL
PROT UR STRIP-MCNC: 30 MG/DL
PROT UR STRIP-MCNC: 300 MG/DL
PROT UR STRIP-MCNC: 300 MG/DL
PROTHROMBIN TIME: 12.7 SEC (ref 11.5–15.2)
PROX AORTIC AP: 1.6 CM
PROX AORTIC TRANS: 1.79 CM
Q-T INTERVAL, ECG07: 392 MS
Q-T INTERVAL, ECG07: 410 MS
Q-T INTERVAL, ECG07: 432 MS
QRS DURATION, ECG06: 100 MS
QRS DURATION, ECG06: 94 MS
QRS DURATION, ECG06: 98 MS
QTC CALCULATION (BEZET), ECG08: 446 MS
QTC CALCULATION (BEZET), ECG08: 462 MS
QTC CALCULATION (BEZET), ECG08: 472 MS
RBC # BLD AUTO: 3.58 M/UL (ref 4.2–5.3)
RBC # BLD AUTO: 3.8 M/UL (ref 4.2–5.3)
RBC # BLD AUTO: 3.92 M/UL (ref 4.2–5.3)
RBC # BLD AUTO: 3.94 M/UL (ref 4.2–5.3)
RBC # BLD AUTO: 4.02 M/UL (ref 4.2–5.3)
RBC # BLD AUTO: 4.2 M/UL (ref 4.2–5.3)
RBC # BLD AUTO: 4.3 M/UL (ref 4.2–5.3)
RBC # BLD AUTO: 4.31 M/UL (ref 4.2–5.3)
RBC # BLD AUTO: 4.36 M/UL (ref 4.2–5.3)
RBC # BLD AUTO: 4.47 M/UL (ref 4.2–5.3)
RBC # BLD AUTO: 4.52 M/UL (ref 4.2–5.3)
RBC #/AREA URNS HPF: ABNORMAL /HPF (ref 0–5)
RIGHT INTERLOBAR EDV: 9.3 CM/S
RIGHT INTERLOBAR PSV: 38 CM/S
RIGHT INTERLOBAR RI: 0.8
RIGHT KIDNEY LENGTH: 10.28 CM
RIGHT KIDNEY WIDTH: 5.25 CM
RIGHT RENAL DIST DIAS: 16.1 CM/S
RIGHT RENAL DIST RAR: 0.88
RIGHT RENAL DIST RI: 0.78
RIGHT RENAL DIST SYS: 73.2 CM/S
RIGHT RENAL LOWER PARENCHYMA MAX: 12.3 CM/S
RIGHT RENAL LOWER PARENCHYMA MIN: 7.3 CM/S
RIGHT RENAL LOWER PARENCHYMA RI: 0.41
RIGHT RENAL MID DIAS: 19.4 CM/S
RIGHT RENAL MID RAR: 1.25
RIGHT RENAL MID RI: 0.81
RIGHT RENAL MID SYS: 103.4 CM/S
RIGHT RENAL MIDDLE PARENCHYMA MAX: 12.7 CM/S
RIGHT RENAL MIDDLE PARENCHYMA MIN: 4.1 CM/S
RIGHT RENAL MIDDLE PARENCHYMA RI: 0.68
RIGHT RENAL ORIGIN DIAS: 24.9 CM/S
RIGHT RENAL ORIGIN RAR: 1.13
RIGHT RENAL ORIGIN RI: 0.74
RIGHT RENAL ORIGIN SYS: 94 CM/S
RIGHT RENAL PROX DIAS: 19.3 CM/S
RIGHT RENAL PROX RAR: 1.14
RIGHT RENAL PROX RI: 0.8
RIGHT RENAL PROX SYS: 94.8 CM/S
RIGHT RENAL UPPER PARENCHYMA MAX: 13 CM/S
RIGHT RENAL UPPER PARENCHYMA MIN: 4.5 CM/S
RIGHT RENAL UPPER PARENCHYMA RI: 0.65
SAO2 % BLD: 28 %
SAO2 % BLD: 98 %
SARS-COV-2, COV2NT: NOT DETECTED
SERVICE CMNT-IMP: ABNORMAL
SERVICE CMNT-IMP: NORMAL
SODIUM BLD-SCNC: 145 MMOL/L (ref 136–145)
SODIUM BLD-SCNC: 145 MMOL/L (ref 136–145)
SODIUM SERPL-SCNC: 131 MMOL/L (ref 136–145)
SODIUM SERPL-SCNC: 132 MMOL/L (ref 136–145)
SODIUM SERPL-SCNC: 134 MMOL/L (ref 136–145)
SODIUM SERPL-SCNC: 135 MMOL/L (ref 136–145)
SODIUM SERPL-SCNC: 135 MMOL/L (ref 136–145)
SODIUM SERPL-SCNC: 136 MMOL/L (ref 136–145)
SODIUM SERPL-SCNC: 136 MMOL/L (ref 136–145)
SODIUM SERPL-SCNC: 137 MMOL/L (ref 136–145)
SODIUM SERPL-SCNC: 137 MMOL/L (ref 136–145)
SODIUM SERPL-SCNC: 138 MMOL/L (ref 136–145)
SODIUM SERPL-SCNC: 139 MMOL/L (ref 136–145)
SODIUM SERPL-SCNC: 145 MMOL/L (ref 136–145)
SODIUM SERPL-SCNC: 146 MMOL/L (ref 136–145)
SOURCE, COVRS: NORMAL
SP GR UR REFRACTOMETRY: 1.01 (ref 1–1.03)
SP GR UR REFRACTOMETRY: 1.02 (ref 1–1.03)
SP GR UR REFRACTOMETRY: 1.02 (ref 1–1.03)
SP GR UR REFRACTOMETRY: 1.03 (ref 1–1.03)
SPECIMEN SITE: ABNORMAL
SPECIMEN SITE: ABNORMAL
TRIGL SERPL-MCNC: 130 MG/DL (ref ?–150)
TROPONIN I SERPL-MCNC: 0.02 NG/ML (ref 0–0.04)
TROPONIN I SERPL-MCNC: <0.02 NG/ML (ref 0–0.04)
TSH SERPL DL<=0.05 MIU/L-ACNC: 2.66 UIU/ML (ref 0.36–3.74)
TSH SERPL DL<=0.05 MIU/L-ACNC: 3.02 UIU/ML (ref 0.36–3.74)
UROBILINOGEN UR QL STRIP.AUTO: 0.2 EU/DL (ref 0.2–1)
UROBILINOGEN UR QL STRIP.AUTO: 1 EU/DL (ref 0.2–1)
VANCOMYCIN SERPL-MCNC: 17.2 UG/ML (ref 5–40)
VENTRICULAR RATE, ECG03: 69 BPM
VENTRICULAR RATE, ECG03: 78 BPM
VENTRICULAR RATE, ECG03: 80 BPM
VLDLC SERPL CALC-MCNC: 26 MG/DL
WBC # BLD AUTO: 4.5 K/UL (ref 4.6–13.2)
WBC # BLD AUTO: 4.8 K/UL (ref 4.6–13.2)
WBC # BLD AUTO: 4.9 K/UL (ref 4.6–13.2)
WBC # BLD AUTO: 5.2 K/UL (ref 4.6–13.2)
WBC # BLD AUTO: 6.6 K/UL (ref 4.6–13.2)
WBC # BLD AUTO: 6.7 K/UL (ref 4.6–13.2)
WBC # BLD AUTO: 7.3 K/UL (ref 4.6–13.2)
WBC # BLD AUTO: 7.7 K/UL (ref 4.6–13.2)
WBC # BLD AUTO: 7.7 K/UL (ref 4.6–13.2)
WBC # BLD AUTO: 7.8 K/UL (ref 4.6–13.2)
WBC # BLD AUTO: 8.4 K/UL (ref 4.6–13.2)
WBC URNS QL MICRO: ABNORMAL /HPF (ref 0–4)

## 2021-01-01 PROCEDURE — 85025 COMPLETE CBC W/AUTO DIFF WBC: CPT

## 2021-01-01 PROCEDURE — 82962 GLUCOSE BLOOD TEST: CPT

## 2021-01-01 PROCEDURE — U0003 INFECTIOUS AGENT DETECTION BY NUCLEIC ACID (DNA OR RNA); SEVERE ACUTE RESPIRATORY SYNDROME CORONAVIRUS 2 (SARS-COV-2) (CORONAVIRUS DISEASE [COVID-19]), AMPLIFIED PROBE TECHNIQUE, MAKING USE OF HIGH THROUGHPUT TECHNOLOGIES AS DESCRIBED BY CMS-2020-01-R: HCPCS

## 2021-01-01 PROCEDURE — 74011000250 HC RX REV CODE- 250: Performed by: EMERGENCY MEDICINE

## 2021-01-01 PROCEDURE — 65660000000 HC RM CCU STEPDOWN

## 2021-01-01 PROCEDURE — 74011250636 HC RX REV CODE- 250/636: Performed by: INTERNAL MEDICINE

## 2021-01-01 PROCEDURE — 65620000000 HC RM CCU GENERAL

## 2021-01-01 PROCEDURE — 74011250637 HC RX REV CODE- 250/637: Performed by: INTERNAL MEDICINE

## 2021-01-01 PROCEDURE — 87086 URINE CULTURE/COLONY COUNT: CPT

## 2021-01-01 PROCEDURE — 77030040392 HC DRSG OPTIFOAM MDII -A

## 2021-01-01 PROCEDURE — 80053 COMPREHEN METABOLIC PANEL: CPT

## 2021-01-01 PROCEDURE — 74011250636 HC RX REV CODE- 250/636: Performed by: EMERGENCY MEDICINE

## 2021-01-01 PROCEDURE — 97166 OT EVAL MOD COMPLEX 45 MIN: CPT

## 2021-01-01 PROCEDURE — 84443 ASSAY THYROID STIM HORMONE: CPT

## 2021-01-01 PROCEDURE — 36415 COLL VENOUS BLD VENIPUNCTURE: CPT

## 2021-01-01 PROCEDURE — 76770 US EXAM ABDO BACK WALL COMP: CPT

## 2021-01-01 PROCEDURE — 99232 SBSQ HOSP IP/OBS MODERATE 35: CPT | Performed by: FAMILY MEDICINE

## 2021-01-01 PROCEDURE — 74011000258 HC RX REV CODE- 258: Performed by: EMERGENCY MEDICINE

## 2021-01-01 PROCEDURE — 80307 DRUG TEST PRSMV CHEM ANLYZR: CPT

## 2021-01-01 PROCEDURE — 71250 CT THORAX DX C-: CPT

## 2021-01-01 PROCEDURE — 81001 URINALYSIS AUTO W/SCOPE: CPT

## 2021-01-01 PROCEDURE — 85652 RBC SED RATE AUTOMATED: CPT

## 2021-01-01 PROCEDURE — 74011636637 HC RX REV CODE- 636/637: Performed by: INTERNAL MEDICINE

## 2021-01-01 PROCEDURE — 99223 1ST HOSP IP/OBS HIGH 75: CPT | Performed by: INTERNAL MEDICINE

## 2021-01-01 PROCEDURE — 70450 CT HEAD/BRAIN W/O DYE: CPT

## 2021-01-01 PROCEDURE — G0155 HHCP-SVS OF CSW,EA 15 MIN: HCPCS

## 2021-01-01 PROCEDURE — 3336500001 HSPC ELECTION

## 2021-01-01 PROCEDURE — 74011250637 HC RX REV CODE- 250/637: Performed by: EMERGENCY MEDICINE

## 2021-01-01 PROCEDURE — 93321 DOPPLER ECHO F-UP/LMTD STD: CPT

## 2021-01-01 PROCEDURE — G8419 CALC BMI OUT NRM PARAM NOF/U: HCPCS | Performed by: INTERNAL MEDICINE

## 2021-01-01 PROCEDURE — 99285 EMERGENCY DEPT VISIT HI MDM: CPT

## 2021-01-01 PROCEDURE — 74011250636 HC RX REV CODE- 250/636: Performed by: STUDENT IN AN ORGANIZED HEALTH CARE EDUCATION/TRAINING PROGRAM

## 2021-01-01 PROCEDURE — 83036 HEMOGLOBIN GLYCOSYLATED A1C: CPT

## 2021-01-01 PROCEDURE — 99232 SBSQ HOSP IP/OBS MODERATE 35: CPT | Performed by: INTERNAL MEDICINE

## 2021-01-01 PROCEDURE — 74011250637 HC RX REV CODE- 250/637: Performed by: FAMILY MEDICINE

## 2021-01-01 PROCEDURE — 0651 HSPC ROUTINE HOME CARE

## 2021-01-01 PROCEDURE — G8428 CUR MEDS NOT DOCUMENT: HCPCS | Performed by: INTERNAL MEDICINE

## 2021-01-01 PROCEDURE — 87077 CULTURE AEROBIC IDENTIFY: CPT

## 2021-01-01 PROCEDURE — 77030038269 HC DRN EXT URIN PURWCK BARD -A

## 2021-01-01 PROCEDURE — 96374 THER/PROPH/DIAG INJ IV PUSH: CPT

## 2021-01-01 PROCEDURE — 76450000000

## 2021-01-01 PROCEDURE — 80048 BASIC METABOLIC PNL TOTAL CA: CPT

## 2021-01-01 PROCEDURE — 99233 SBSQ HOSP IP/OBS HIGH 50: CPT | Performed by: INTERNAL MEDICINE

## 2021-01-01 PROCEDURE — 97162 PT EVAL MOD COMPLEX 30 MIN: CPT

## 2021-01-01 PROCEDURE — 85027 COMPLETE CBC AUTOMATED: CPT

## 2021-01-01 PROCEDURE — 80202 ASSAY OF VANCOMYCIN: CPT

## 2021-01-01 PROCEDURE — 87635 SARS-COV-2 COVID-19 AMP PRB: CPT

## 2021-01-01 PROCEDURE — 97535 SELF CARE MNGMENT TRAINING: CPT

## 2021-01-01 PROCEDURE — 65270000029 HC RM PRIVATE

## 2021-01-01 PROCEDURE — 99223 1ST HOSP IP/OBS HIGH 75: CPT | Performed by: PSYCHIATRY & NEUROLOGY

## 2021-01-01 PROCEDURE — 65660000004 HC RM CVT STEPDOWN

## 2021-01-01 PROCEDURE — 84484 ASSAY OF TROPONIN QUANT: CPT

## 2021-01-01 PROCEDURE — 83735 ASSAY OF MAGNESIUM: CPT

## 2021-01-01 PROCEDURE — 74018 RADEX ABDOMEN 1 VIEW: CPT

## 2021-01-01 PROCEDURE — 82533 TOTAL CORTISOL: CPT

## 2021-01-01 PROCEDURE — 93005 ELECTROCARDIOGRAM TRACING: CPT

## 2021-01-01 PROCEDURE — 93975 VASCULAR STUDY: CPT

## 2021-01-01 PROCEDURE — 2709999900 HC NON-CHARGEABLE SUPPLY

## 2021-01-01 PROCEDURE — 85610 PROTHROMBIN TIME: CPT

## 2021-01-01 PROCEDURE — G8510 SCR DEP NEG, NO PLAN REQD: HCPCS | Performed by: INTERNAL MEDICINE

## 2021-01-01 PROCEDURE — 5A2204Z RESTORATION OF CARDIAC RHYTHM, SINGLE: ICD-10-PCS | Performed by: EMERGENCY MEDICINE

## 2021-01-01 PROCEDURE — 83690 ASSAY OF LIPASE: CPT

## 2021-01-01 PROCEDURE — 99283 EMERGENCY DEPT VISIT LOW MDM: CPT

## 2021-01-01 PROCEDURE — 80061 LIPID PANEL: CPT

## 2021-01-01 PROCEDURE — G0299 HHS/HOSPICE OF RN EA 15 MIN: HCPCS

## 2021-01-01 PROCEDURE — 99231 SBSQ HOSP IP/OBS SF/LOW 25: CPT | Performed by: NURSE PRACTITIONER

## 2021-01-01 PROCEDURE — 83880 ASSAY OF NATRIURETIC PEPTIDE: CPT

## 2021-01-01 PROCEDURE — 93306 TTE W/DOPPLER COMPLETE: CPT

## 2021-01-01 PROCEDURE — 3331090004 HSPC SERVICE INTENSITY ADD-ON

## 2021-01-01 PROCEDURE — 82140 ASSAY OF AMMONIA: CPT

## 2021-01-01 PROCEDURE — 93971 EXTREMITY STUDY: CPT

## 2021-01-01 PROCEDURE — 1101F PT FALLS ASSESS-DOCD LE1/YR: CPT | Performed by: INTERNAL MEDICINE

## 2021-01-01 PROCEDURE — 97530 THERAPEUTIC ACTIVITIES: CPT

## 2021-01-01 PROCEDURE — G8536 NO DOC ELDER MAL SCRN: HCPCS | Performed by: INTERNAL MEDICINE

## 2021-01-01 PROCEDURE — 99239 HOSP IP/OBS DSCHRG MGMT >30: CPT | Performed by: FAMILY MEDICINE

## 2021-01-01 PROCEDURE — 87040 BLOOD CULTURE FOR BACTERIA: CPT

## 2021-01-01 PROCEDURE — 84295 ASSAY OF SERUM SODIUM: CPT

## 2021-01-01 PROCEDURE — 86140 C-REACTIVE PROTEIN: CPT

## 2021-01-01 PROCEDURE — 74011000250 HC RX REV CODE- 250: Performed by: PSYCHIATRY & NEUROLOGY

## 2021-01-01 PROCEDURE — 82024 ASSAY OF ACTH: CPT

## 2021-01-01 PROCEDURE — 74011250636 HC RX REV CODE- 250/636: Performed by: PHYSICIAN ASSISTANT

## 2021-01-01 PROCEDURE — 99233 SBSQ HOSP IP/OBS HIGH 50: CPT | Performed by: NURSE PRACTITIONER

## 2021-01-01 PROCEDURE — 99284 EMERGENCY DEPT VISIT MOD MDM: CPT

## 2021-01-01 PROCEDURE — 97116 GAIT TRAINING THERAPY: CPT

## 2021-01-01 PROCEDURE — 74011636637 HC RX REV CODE- 636/637: Performed by: EMERGENCY MEDICINE

## 2021-01-01 PROCEDURE — 97168 OT RE-EVAL EST PLAN CARE: CPT

## 2021-01-01 PROCEDURE — 77030037878 HC DRSG MEPILEX >48IN BORD MOLN -B

## 2021-01-01 PROCEDURE — 74011000258 HC RX REV CODE- 258: Performed by: STUDENT IN AN ORGANIZED HEALTH CARE EDUCATION/TRAINING PROGRAM

## 2021-01-01 PROCEDURE — 74011250637 HC RX REV CODE- 250/637: Performed by: HOSPITALIST

## 2021-01-01 PROCEDURE — 99223 1ST HOSP IP/OBS HIGH 75: CPT | Performed by: NURSE PRACTITIONER

## 2021-01-01 PROCEDURE — 74011250637 HC RX REV CODE- 250/637: Performed by: NURSE PRACTITIONER

## 2021-01-01 PROCEDURE — 87186 SC STD MICRODIL/AGAR DIL: CPT

## 2021-01-01 PROCEDURE — 99213 OFFICE O/P EST LOW 20 MIN: CPT | Performed by: INTERNAL MEDICINE

## 2021-01-01 PROCEDURE — 71045 X-RAY EXAM CHEST 1 VIEW: CPT

## 2021-01-01 PROCEDURE — 99223 1ST HOSP IP/OBS HIGH 75: CPT | Performed by: EMERGENCY MEDICINE

## 2021-01-01 PROCEDURE — 99232 SBSQ HOSP IP/OBS MODERATE 35: CPT | Performed by: NURSE PRACTITIONER

## 2021-01-01 PROCEDURE — 73564 X-RAY EXAM KNEE 4 OR MORE: CPT

## 2021-01-01 PROCEDURE — 91303 HC RX REV CODE- 250/636: CPT | Performed by: INTERNAL MEDICINE

## 2021-01-01 PROCEDURE — 99239 HOSP IP/OBS DSCHRG MGMT >30: CPT | Performed by: INTERNAL MEDICINE

## 2021-01-01 PROCEDURE — 96365 THER/PROPH/DIAG IV INF INIT: CPT

## 2021-01-01 PROCEDURE — 97110 THERAPEUTIC EXERCISES: CPT

## 2021-01-01 PROCEDURE — 80069 RENAL FUNCTION PANEL: CPT

## 2021-01-01 PROCEDURE — 99233 SBSQ HOSP IP/OBS HIGH 50: CPT | Performed by: FAMILY MEDICINE

## 2021-01-01 PROCEDURE — 97112 NEUROMUSCULAR REEDUCATION: CPT

## 2021-01-01 PROCEDURE — 1090F PRES/ABSN URINE INCON ASSESS: CPT | Performed by: INTERNAL MEDICINE

## 2021-01-01 PROCEDURE — 74011250636 HC RX REV CODE- 250/636: Performed by: FAMILY MEDICINE

## 2021-01-01 PROCEDURE — 97164 PT RE-EVAL EST PLAN CARE: CPT

## 2021-01-01 PROCEDURE — 74011250637 HC RX REV CODE- 250/637: Performed by: SPECIALIST

## 2021-01-01 PROCEDURE — 92526 ORAL FUNCTION THERAPY: CPT

## 2021-01-01 PROCEDURE — U0005 INFEC AGEN DETEC AMPLI PROBE: HCPCS

## 2021-01-01 PROCEDURE — 92610 EVALUATE SWALLOWING FUNCTION: CPT

## 2021-01-01 PROCEDURE — G0156 HHCP-SVS OF AIDE,EA 15 MIN: HCPCS

## 2021-01-01 PROCEDURE — 82553 CREATINE MB FRACTION: CPT

## 2021-01-01 PROCEDURE — 84100 ASSAY OF PHOSPHORUS: CPT

## 2021-01-01 PROCEDURE — 1111F DSCHRG MED/CURRENT MED MERGE: CPT | Performed by: INTERNAL MEDICINE

## 2021-01-01 PROCEDURE — 74011000250 HC RX REV CODE- 250: Performed by: NURSE PRACTITIONER

## 2021-01-01 PROCEDURE — 74176 CT ABD & PELVIS W/O CONTRAST: CPT

## 2021-01-01 PROCEDURE — 84145 PROCALCITONIN (PCT): CPT

## 2021-01-01 RX ORDER — ACETAMINOPHEN 650 MG/1
650 SUPPOSITORY RECTAL
Status: DISCONTINUED | OUTPATIENT
Start: 2021-01-01 | End: 2021-01-01 | Stop reason: HOSPADM

## 2021-01-01 RX ORDER — POLYETHYLENE GLYCOL 3350 17 G/17G
17 POWDER, FOR SOLUTION ORAL
Status: DISCONTINUED | OUTPATIENT
Start: 2021-01-01 | End: 2021-01-01

## 2021-01-01 RX ORDER — MAGNESIUM SULFATE 100 %
16 CRYSTALS MISCELLANEOUS AS NEEDED
Status: DISCONTINUED | OUTPATIENT
Start: 2021-01-01 | End: 2021-01-01

## 2021-01-01 RX ORDER — ONDANSETRON 4 MG/1
4 TABLET, ORALLY DISINTEGRATING ORAL
Status: DISCONTINUED | OUTPATIENT
Start: 2021-01-01 | End: 2021-01-01 | Stop reason: HOSPADM

## 2021-01-01 RX ORDER — ACETAMINOPHEN 325 MG/1
650 TABLET ORAL
Status: DISCONTINUED | OUTPATIENT
Start: 2021-01-01 | End: 2021-01-01 | Stop reason: HOSPADM

## 2021-01-01 RX ORDER — ONDANSETRON 2 MG/ML
4 INJECTION INTRAMUSCULAR; INTRAVENOUS
Status: DISCONTINUED | OUTPATIENT
Start: 2021-01-01 | End: 2021-01-01 | Stop reason: HOSPADM

## 2021-01-01 RX ORDER — ACETAMINOPHEN AND CODEINE PHOSPHATE 300; 30 MG/1; MG/1
1 TABLET ORAL
Qty: 18 TAB | Refills: 0 | Status: SHIPPED | OUTPATIENT
Start: 2021-01-01 | End: 2021-01-01

## 2021-01-01 RX ORDER — MORPHINE SULFATE 20 MG/ML
5 SOLUTION ORAL
Qty: 30 ML | Refills: 0 | Status: SHIPPED | OUTPATIENT
Start: 2021-01-01 | End: 2021-10-19

## 2021-01-01 RX ORDER — SODIUM CHLORIDE 9 MG/ML
50 INJECTION, SOLUTION INTRAVENOUS CONTINUOUS
Status: DISCONTINUED | OUTPATIENT
Start: 2021-01-01 | End: 2021-01-01

## 2021-01-01 RX ORDER — MORPHINE SULFATE 20 MG/ML
5 SOLUTION ORAL
Status: DISCONTINUED | OUTPATIENT
Start: 2021-01-01 | End: 2021-01-01 | Stop reason: HOSPADM

## 2021-01-01 RX ORDER — LORAZEPAM 2 MG/ML
0.5 CONCENTRATE ORAL
Qty: 30 ML | Refills: 0 | Status: SHIPPED | OUTPATIENT
Start: 2021-01-01

## 2021-01-01 RX ORDER — METOPROLOL TARTRATE 25 MG/1
25 TABLET, FILM COATED ORAL 2 TIMES DAILY
Status: DISCONTINUED | OUTPATIENT
Start: 2021-01-01 | End: 2021-01-01 | Stop reason: HOSPADM

## 2021-01-01 RX ORDER — SCOLOPAMINE TRANSDERMAL SYSTEM 1 MG/1
1 PATCH, EXTENDED RELEASE TRANSDERMAL
Status: DISCONTINUED | OUTPATIENT
Start: 2021-01-01 | End: 2021-01-01 | Stop reason: HOSPADM

## 2021-01-01 RX ORDER — POLYETHYLENE GLYCOL 3350 17 G/17G
17 POWDER, FOR SOLUTION ORAL DAILY PRN
Status: DISCONTINUED | OUTPATIENT
Start: 2021-01-01 | End: 2021-01-01 | Stop reason: HOSPADM

## 2021-01-01 RX ORDER — FUROSEMIDE 20 MG/1
20 TABLET ORAL DAILY
Qty: 30 TABLET | Refills: 0 | Status: SHIPPED
Start: 2021-01-01 | End: 2021-01-01

## 2021-01-01 RX ORDER — DEXTROSE 50 % IN WATER (D50W) INTRAVENOUS SYRINGE
25-50 AS NEEDED
Status: DISCONTINUED | OUTPATIENT
Start: 2021-01-01 | End: 2021-01-01 | Stop reason: HOSPADM

## 2021-01-01 RX ORDER — CHLORPROMAZINE HYDROCHLORIDE 10 MG/1
10 TABLET, FILM COATED ORAL
Status: DISCONTINUED | OUTPATIENT
Start: 2021-01-01 | End: 2021-01-01 | Stop reason: HOSPADM

## 2021-01-01 RX ORDER — ENOXAPARIN SODIUM 100 MG/ML
30 INJECTION SUBCUTANEOUS DAILY
Status: DISCONTINUED | OUTPATIENT
Start: 2021-01-01 | End: 2021-01-01 | Stop reason: HOSPADM

## 2021-01-01 RX ORDER — DOCUSATE SODIUM 100 MG/1
100 CAPSULE, LIQUID FILLED ORAL 2 TIMES DAILY
Status: DISCONTINUED | OUTPATIENT
Start: 2021-01-01 | End: 2021-01-01 | Stop reason: HOSPADM

## 2021-01-01 RX ORDER — ASPIRIN 300 MG/1
300 SUPPOSITORY RECTAL DAILY
Status: DISCONTINUED | OUTPATIENT
Start: 2021-01-01 | End: 2021-01-01

## 2021-01-01 RX ORDER — LOSARTAN POTASSIUM 50 MG/1
100 TABLET ORAL
Status: COMPLETED | OUTPATIENT
Start: 2021-01-01 | End: 2021-01-01

## 2021-01-01 RX ORDER — HYDRALAZINE HYDROCHLORIDE 20 MG/ML
20 INJECTION INTRAMUSCULAR; INTRAVENOUS
Status: DISCONTINUED | OUTPATIENT
Start: 2021-01-01 | End: 2021-01-01 | Stop reason: HOSPADM

## 2021-01-01 RX ORDER — INSULIN LISPRO 100 [IU]/ML
INJECTION, SOLUTION INTRAVENOUS; SUBCUTANEOUS
Status: DISCONTINUED | OUTPATIENT
Start: 2021-01-01 | End: 2021-01-01

## 2021-01-01 RX ORDER — GABAPENTIN 300 MG/1
300 CAPSULE ORAL 2 TIMES DAILY
Qty: 6 CAPSULE | Refills: 0 | Status: SHIPPED | OUTPATIENT
Start: 2021-01-01 | End: 2021-01-01

## 2021-01-01 RX ORDER — LOSARTAN POTASSIUM 50 MG/1
50 TABLET ORAL DAILY
Status: DISCONTINUED | OUTPATIENT
Start: 2021-01-01 | End: 2021-01-01 | Stop reason: HOSPADM

## 2021-01-01 RX ORDER — GABAPENTIN 300 MG/1
300 CAPSULE ORAL 2 TIMES DAILY
Status: DISCONTINUED | OUTPATIENT
Start: 2021-01-01 | End: 2021-01-01 | Stop reason: HOSPADM

## 2021-01-01 RX ORDER — DEXTROSE MONOHYDRATE 50 MG/ML
75 INJECTION, SOLUTION INTRAVENOUS CONTINUOUS
Status: DISPENSED | OUTPATIENT
Start: 2021-01-01 | End: 2021-01-01

## 2021-01-01 RX ORDER — AMLODIPINE BESYLATE 10 MG/1
10 TABLET ORAL
Status: DISPENSED | OUTPATIENT
Start: 2021-01-01 | End: 2021-01-01

## 2021-01-01 RX ORDER — METOPROLOL TARTRATE 25 MG/1
25 TABLET, FILM COATED ORAL 2 TIMES DAILY
Qty: 60 TABLET | Refills: 0 | Status: SHIPPED
Start: 2021-01-01 | End: 2021-01-01

## 2021-01-01 RX ORDER — SODIUM CHLORIDE 0.9 % (FLUSH) 0.9 %
5-40 SYRINGE (ML) INJECTION AS NEEDED
Status: DISCONTINUED | OUTPATIENT
Start: 2021-01-01 | End: 2021-01-01 | Stop reason: HOSPADM

## 2021-01-01 RX ORDER — FUROSEMIDE 10 MG/ML
40 INJECTION INTRAMUSCULAR; INTRAVENOUS DAILY
Status: DISCONTINUED | OUTPATIENT
Start: 2021-01-01 | End: 2021-01-01

## 2021-01-01 RX ORDER — LOSARTAN POTASSIUM 50 MG/1
50 TABLET ORAL DAILY
Qty: 30 TABLET | Refills: 0 | Status: SHIPPED
Start: 2021-01-01 | End: 2021-01-01

## 2021-01-01 RX ORDER — SODIUM CHLORIDE 0.9 % (FLUSH) 0.9 %
5-40 SYRINGE (ML) INJECTION EVERY 8 HOURS
Status: DISCONTINUED | OUTPATIENT
Start: 2021-01-01 | End: 2021-01-01 | Stop reason: HOSPADM

## 2021-01-01 RX ORDER — TROSPIUM CHLORIDE 20 MG/1
20 TABLET, FILM COATED ORAL DAILY
Status: DISCONTINUED | OUTPATIENT
Start: 2021-01-01 | End: 2021-01-01 | Stop reason: HOSPADM

## 2021-01-01 RX ORDER — HYDRALAZINE HYDROCHLORIDE 20 MG/ML
10 INJECTION INTRAMUSCULAR; INTRAVENOUS
Status: DISCONTINUED | OUTPATIENT
Start: 2021-01-01 | End: 2021-01-01

## 2021-01-01 RX ORDER — POLYETHYLENE GLYCOL 3350 17 G/17G
17 POWDER, FOR SOLUTION ORAL DAILY
Qty: 117 G | Refills: 0 | Status: SHIPPED | OUTPATIENT
Start: 2021-01-01

## 2021-01-01 RX ORDER — LOSARTAN POTASSIUM 50 MG/1
100 TABLET ORAL DAILY
Status: DISCONTINUED | OUTPATIENT
Start: 2021-01-01 | End: 2021-01-01

## 2021-01-01 RX ORDER — DEXTROSE MONOHYDRATE 50 MG/ML
75 INJECTION, SOLUTION INTRAVENOUS CONTINUOUS
Status: DISCONTINUED | OUTPATIENT
Start: 2021-01-01 | End: 2021-01-01

## 2021-01-01 RX ORDER — INSULIN LISPRO 100 [IU]/ML
INJECTION, SOLUTION INTRAVENOUS; SUBCUTANEOUS EVERY 6 HOURS
Status: DISCONTINUED | OUTPATIENT
Start: 2021-01-01 | End: 2021-01-01

## 2021-01-01 RX ORDER — GABAPENTIN 300 MG/1
300 CAPSULE ORAL 3 TIMES DAILY
Status: DISCONTINUED | OUTPATIENT
Start: 2021-01-01 | End: 2021-01-01

## 2021-01-01 RX ORDER — HYDRALAZINE HYDROCHLORIDE 20 MG/ML
10 INJECTION INTRAMUSCULAR; INTRAVENOUS ONCE
Status: COMPLETED | OUTPATIENT
Start: 2021-01-01 | End: 2021-01-01

## 2021-01-01 RX ORDER — ERGOCALCIFEROL 1.25 MG/1
50000 CAPSULE ORAL
Status: DISCONTINUED | OUTPATIENT
Start: 2021-01-01 | End: 2021-01-01 | Stop reason: HOSPADM

## 2021-01-01 RX ORDER — DEXTROSE MONOHYDRATE AND SODIUM CHLORIDE 5; .45 G/100ML; G/100ML
25 INJECTION, SOLUTION INTRAVENOUS CONTINUOUS
Status: DISCONTINUED | OUTPATIENT
Start: 2021-01-01 | End: 2021-01-01

## 2021-01-01 RX ORDER — HYDROCORTISONE SODIUM SUCCINATE 100 MG/2ML
100 INJECTION, POWDER, FOR SOLUTION INTRAMUSCULAR; INTRAVENOUS EVERY 8 HOURS
Status: DISCONTINUED | OUTPATIENT
Start: 2021-01-01 | End: 2021-01-01

## 2021-01-01 RX ORDER — POLYETHYLENE GLYCOL 3350 17 G/17G
17 POWDER, FOR SOLUTION ORAL DAILY
Qty: 117 G | Refills: 0 | Status: ON HOLD | OUTPATIENT
Start: 2021-01-01 | End: 2021-01-01 | Stop reason: SDUPTHER

## 2021-01-01 RX ORDER — HYOSCYAMINE SULFATE 0.12 MG/1
0.12 TABLET SUBLINGUAL
Qty: 30 TABLET | Refills: 0 | Status: SHIPPED | OUTPATIENT
Start: 2021-01-01

## 2021-01-01 RX ORDER — HYOSCYAMINE SULFATE 0.12 MG/1
0.12 TABLET SUBLINGUAL
Status: DISCONTINUED | OUTPATIENT
Start: 2021-01-01 | End: 2021-01-01 | Stop reason: HOSPADM

## 2021-01-01 RX ORDER — IPRATROPIUM BROMIDE AND ALBUTEROL SULFATE 2.5; .5 MG/3ML; MG/3ML
3 SOLUTION RESPIRATORY (INHALATION)
Status: DISCONTINUED | OUTPATIENT
Start: 2021-01-01 | End: 2021-01-01 | Stop reason: HOSPADM

## 2021-01-01 RX ORDER — HYDRALAZINE HYDROCHLORIDE 20 MG/ML
20 INJECTION INTRAMUSCULAR; INTRAVENOUS ONCE
Status: COMPLETED | OUTPATIENT
Start: 2021-01-01 | End: 2021-01-01

## 2021-01-01 RX ORDER — MAGNESIUM SULFATE 100 %
4 CRYSTALS MISCELLANEOUS AS NEEDED
Status: DISCONTINUED | OUTPATIENT
Start: 2021-01-01 | End: 2021-01-01 | Stop reason: HOSPADM

## 2021-01-01 RX ORDER — METOPROLOL TARTRATE 50 MG/1
50 TABLET ORAL 2 TIMES DAILY
Status: DISCONTINUED | OUTPATIENT
Start: 2021-01-01 | End: 2021-01-01

## 2021-01-01 RX ORDER — HYDRALAZINE HYDROCHLORIDE 20 MG/ML
20 INJECTION INTRAMUSCULAR; INTRAVENOUS
Status: DISCONTINUED | OUTPATIENT
Start: 2021-01-01 | End: 2021-01-01

## 2021-01-01 RX ORDER — FUROSEMIDE 20 MG/1
20 TABLET ORAL DAILY
Status: DISCONTINUED | OUTPATIENT
Start: 2021-01-01 | End: 2021-01-01 | Stop reason: HOSPADM

## 2021-01-01 RX ORDER — DEXTROSE 50 % IN WATER (D50W) INTRAVENOUS SYRINGE
50 AS NEEDED
Status: DISCONTINUED | OUTPATIENT
Start: 2021-01-01 | End: 2021-01-01

## 2021-01-01 RX ORDER — METOPROLOL TARTRATE 50 MG/1
100 TABLET ORAL 2 TIMES DAILY
Status: DISCONTINUED | OUTPATIENT
Start: 2021-01-01 | End: 2021-01-01

## 2021-01-01 RX ORDER — POLYETHYLENE GLYCOL 3350 17 G/17G
17 POWDER, FOR SOLUTION ORAL DAILY
Status: DISCONTINUED | OUTPATIENT
Start: 2021-01-01 | End: 2021-01-01 | Stop reason: HOSPADM

## 2021-01-01 RX ORDER — OXYCODONE AND ACETAMINOPHEN 5; 325 MG/1; MG/1
1 TABLET ORAL
Status: COMPLETED | OUTPATIENT
Start: 2021-01-01 | End: 2021-01-01

## 2021-01-01 RX ORDER — DEXTROMETHORPHAN POLISTIREX 30 MG/5 ML
SUSPENSION, EXTENDED RELEASE 12 HR ORAL AS NEEDED
Status: DISCONTINUED | OUTPATIENT
Start: 2021-01-01 | End: 2021-01-01 | Stop reason: HOSPADM

## 2021-01-01 RX ORDER — AMLODIPINE BESYLATE 5 MG/1
5 TABLET ORAL DAILY
Status: DISCONTINUED | OUTPATIENT
Start: 2021-01-01 | End: 2021-01-01

## 2021-01-01 RX ORDER — NALOXONE HYDROCHLORIDE 1 MG/ML
0.2 INJECTION INTRAMUSCULAR; INTRAVENOUS; SUBCUTANEOUS ONCE
Status: COMPLETED | OUTPATIENT
Start: 2021-01-01 | End: 2021-01-01

## 2021-01-01 RX ORDER — DOCUSATE SODIUM 100 MG/1
100 CAPSULE, LIQUID FILLED ORAL 2 TIMES DAILY
Qty: 60 CAPSULE | Refills: 0 | Status: SHIPPED | OUTPATIENT
Start: 2021-01-01 | End: 2021-01-01

## 2021-01-01 RX ORDER — ERGOCALCIFEROL 1.25 MG/1
50000 CAPSULE ORAL
Qty: 4 CAPSULE | Refills: 0 | Status: SHIPPED
Start: 2021-01-01 | End: 2021-01-01

## 2021-01-01 RX ORDER — FUROSEMIDE 10 MG/ML
40 INJECTION INTRAMUSCULAR; INTRAVENOUS
Status: DISPENSED | OUTPATIENT
Start: 2021-01-01 | End: 2021-01-01

## 2021-01-01 RX ORDER — LORAZEPAM 2 MG/ML
0.5 CONCENTRATE ORAL
Status: DISCONTINUED | OUTPATIENT
Start: 2021-01-01 | End: 2021-01-01 | Stop reason: HOSPADM

## 2021-01-01 RX ORDER — INSULIN LISPRO 100 [IU]/ML
INJECTION, SOLUTION INTRAVENOUS; SUBCUTANEOUS
Status: DISCONTINUED | OUTPATIENT
Start: 2021-01-01 | End: 2021-01-01 | Stop reason: HOSPADM

## 2021-01-01 RX ORDER — POLYETHYLENE GLYCOL 3350 17 G/17G
17 POWDER, FOR SOLUTION ORAL
Status: COMPLETED | OUTPATIENT
Start: 2021-01-01 | End: 2021-01-01

## 2021-01-01 RX ORDER — AMLODIPINE BESYLATE 10 MG/1
10 TABLET ORAL DAILY
Status: DISCONTINUED | OUTPATIENT
Start: 2021-01-01 | End: 2021-01-01

## 2021-01-01 RX ORDER — OXYCODONE AND ACETAMINOPHEN 5; 325 MG/1; MG/1
1 TABLET ORAL
Qty: 10 TABLET | Refills: 0 | Status: SHIPPED | OUTPATIENT
Start: 2021-01-01 | End: 2021-01-01

## 2021-01-01 RX ORDER — OXYCODONE AND ACETAMINOPHEN 5; 325 MG/1; MG/1
1 TABLET ORAL ONCE
Status: COMPLETED | OUTPATIENT
Start: 2021-01-01 | End: 2021-01-01

## 2021-01-01 RX ORDER — ACETAMINOPHEN 500 MG
1000 TABLET ORAL
Status: COMPLETED | OUTPATIENT
Start: 2021-01-01 | End: 2021-01-01

## 2021-01-01 RX ORDER — GLYCOPYRROLATE 0.2 MG/ML
0.2 INJECTION INTRAMUSCULAR; INTRAVENOUS
Status: DISCONTINUED | OUTPATIENT
Start: 2021-01-01 | End: 2021-01-01 | Stop reason: HOSPADM

## 2021-01-01 RX ORDER — DEXTROSE 50 % IN WATER (D50W) INTRAVENOUS SYRINGE
25
Status: COMPLETED | OUTPATIENT
Start: 2021-01-01 | End: 2021-01-01

## 2021-01-01 RX ORDER — AMLODIPINE BESYLATE 5 MG/1
10 TABLET ORAL
Status: COMPLETED | OUTPATIENT
Start: 2021-01-01 | End: 2021-01-01

## 2021-01-01 RX ORDER — HYDRALAZINE HYDROCHLORIDE 20 MG/ML
10 INJECTION INTRAMUSCULAR; INTRAVENOUS
Status: DISCONTINUED | OUTPATIENT
Start: 2021-01-01 | End: 2021-01-01 | Stop reason: HOSPADM

## 2021-01-01 RX ORDER — FUROSEMIDE 40 MG/1
40 TABLET ORAL DAILY
Status: DISCONTINUED | OUTPATIENT
Start: 2021-01-01 | End: 2021-01-01

## 2021-01-01 RX ADMIN — LORAZEPAM 0.5 MG: 2 CONCENTRATE ORAL at 10:10

## 2021-01-01 RX ADMIN — Medication 10 ML: at 06:17

## 2021-01-01 RX ADMIN — VANCOMYCIN HYDROCHLORIDE 750 MG: 750 INJECTION, POWDER, LYOPHILIZED, FOR SOLUTION INTRAVENOUS at 15:50

## 2021-01-01 RX ADMIN — DOCUSATE SODIUM 100 MG: 100 CAPSULE ORAL at 18:10

## 2021-01-01 RX ADMIN — NITROGLYCERIN 1 INCH: 20 OINTMENT TOPICAL at 13:25

## 2021-01-01 RX ADMIN — FAMOTIDINE 20 MG: 10 INJECTION INTRAVENOUS at 08:40

## 2021-01-01 RX ADMIN — SODIUM NITROPRUSSIDE 0.3 MCG/KG/MIN: 25 INJECTION, SOLUTION, CONCENTRATE INTRAVENOUS at 18:32

## 2021-01-01 RX ADMIN — DOCUSATE SODIUM 100 MG: 100 CAPSULE ORAL at 09:03

## 2021-01-01 RX ADMIN — Medication 10 ML: at 21:47

## 2021-01-01 RX ADMIN — Medication 10 ML: at 06:46

## 2021-01-01 RX ADMIN — METOPROLOL TARTRATE 25 MG: 25 TABLET, FILM COATED ORAL at 09:13

## 2021-01-01 RX ADMIN — DOCUSATE SODIUM 100 MG: 100 CAPSULE ORAL at 08:27

## 2021-01-01 RX ADMIN — POLYETHYLENE GLYCOL 3350 17 G: 17 POWDER, FOR SOLUTION ORAL at 02:47

## 2021-01-01 RX ADMIN — DEXTROSE MONOHYDRATE AND SODIUM CHLORIDE 75 ML/HR: 5; .45 INJECTION, SOLUTION INTRAVENOUS at 13:24

## 2021-01-01 RX ADMIN — ENOXAPARIN SODIUM 30 MG: 30 INJECTION SUBCUTANEOUS at 10:09

## 2021-01-01 RX ADMIN — GABAPENTIN 300 MG: 300 CAPSULE ORAL at 17:17

## 2021-01-01 RX ADMIN — GLYCOPYRROLATE 0.2 MG: 0.2 INJECTION INTRAMUSCULAR; INTRAVENOUS at 16:03

## 2021-01-01 RX ADMIN — MORPHINE SULFATE 5 MG: 20 SOLUTION ORAL at 10:40

## 2021-01-01 RX ADMIN — INSULIN LISPRO 2 UNITS: 100 INJECTION, SOLUTION INTRAVENOUS; SUBCUTANEOUS at 22:39

## 2021-01-01 RX ADMIN — THIAMINE HYDROCHLORIDE 100 MG: 100 INJECTION, SOLUTION INTRAMUSCULAR; INTRAVENOUS at 17:47

## 2021-01-01 RX ADMIN — NITROGLYCERIN 1 INCH: 20 OINTMENT TOPICAL at 06:53

## 2021-01-01 RX ADMIN — OXYCODONE HYDROCHLORIDE AND ACETAMINOPHEN 1 TABLET: 5; 325 TABLET ORAL at 13:01

## 2021-01-01 RX ADMIN — HYDROCORTISONE SODIUM SUCCINATE 100 MG: 100 INJECTION, POWDER, FOR SOLUTION INTRAMUSCULAR; INTRAVENOUS at 13:04

## 2021-01-01 RX ADMIN — DOCUSATE SODIUM 100 MG: 100 CAPSULE ORAL at 08:10

## 2021-01-01 RX ADMIN — DOCUSATE SODIUM 100 MG: 100 CAPSULE ORAL at 17:46

## 2021-01-01 RX ADMIN — GABAPENTIN 300 MG: 300 CAPSULE ORAL at 10:37

## 2021-01-01 RX ADMIN — HYDROCORTISONE SODIUM SUCCINATE 100 MG: 100 INJECTION, POWDER, FOR SOLUTION INTRAMUSCULAR; INTRAVENOUS at 06:04

## 2021-01-01 RX ADMIN — ACETAMINOPHEN 1000 MG: 500 TABLET ORAL at 13:33

## 2021-01-01 RX ADMIN — Medication 10 ML: at 13:06

## 2021-01-01 RX ADMIN — GABAPENTIN 300 MG: 300 CAPSULE ORAL at 17:31

## 2021-01-01 RX ADMIN — THIAMINE HYDROCHLORIDE 100 MG: 100 INJECTION, SOLUTION INTRAMUSCULAR; INTRAVENOUS at 08:23

## 2021-01-01 RX ADMIN — POLYETHYLENE GLYCOL 3350 17 G: 17 POWDER, FOR SOLUTION ORAL at 08:58

## 2021-01-01 RX ADMIN — MORPHINE SULFATE 5 MG: 20 SOLUTION ORAL at 12:15

## 2021-01-01 RX ADMIN — METOPROLOL TARTRATE 100 MG: 50 TABLET, FILM COATED ORAL at 17:46

## 2021-01-01 RX ADMIN — Medication 10 ML: at 14:00

## 2021-01-01 RX ADMIN — ENOXAPARIN SODIUM 30 MG: 30 INJECTION SUBCUTANEOUS at 09:03

## 2021-01-01 RX ADMIN — INSULIN LISPRO 2 UNITS: 100 INJECTION, SOLUTION INTRAVENOUS; SUBCUTANEOUS at 22:34

## 2021-01-01 RX ADMIN — DEXTROSE MONOHYDRATE AND SODIUM CHLORIDE 100 ML/HR: 5; .45 INJECTION, SOLUTION INTRAVENOUS at 03:23

## 2021-01-01 RX ADMIN — NITROGLYCERIN 1 INCH: 20 OINTMENT TOPICAL at 13:04

## 2021-01-01 RX ADMIN — METOPROLOL TARTRATE 25 MG: 25 TABLET, FILM COATED ORAL at 10:19

## 2021-01-01 RX ADMIN — GABAPENTIN 300 MG: 300 CAPSULE ORAL at 08:27

## 2021-01-01 RX ADMIN — DOCUSATE SODIUM 100 MG: 100 CAPSULE ORAL at 18:43

## 2021-01-01 RX ADMIN — Medication 10 ML: at 15:58

## 2021-01-01 RX ADMIN — INSULIN LISPRO 2 UNITS: 100 INJECTION, SOLUTION INTRAVENOUS; SUBCUTANEOUS at 06:04

## 2021-01-01 RX ADMIN — NITROGLYCERIN 1 INCH: 20 OINTMENT TOPICAL at 00:00

## 2021-01-01 RX ADMIN — DEXTROSE MONOHYDRATE AND SODIUM CHLORIDE 100 ML/HR: 5; .45 INJECTION, SOLUTION INTRAVENOUS at 02:32

## 2021-01-01 RX ADMIN — ACETAMINOPHEN 650 MG: 325 TABLET ORAL at 05:48

## 2021-01-01 RX ADMIN — METOPROLOL TARTRATE 25 MG: 25 TABLET, FILM COATED ORAL at 08:27

## 2021-01-01 RX ADMIN — Medication 10 ML: at 15:57

## 2021-01-01 RX ADMIN — DOCUSATE SODIUM 100 MG: 100 CAPSULE ORAL at 17:17

## 2021-01-01 RX ADMIN — METOPROLOL TARTRATE 25 MG: 25 TABLET, FILM COATED ORAL at 17:17

## 2021-01-01 RX ADMIN — NITROGLYCERIN 1 INCH: 20 OINTMENT TOPICAL at 17:56

## 2021-01-01 RX ADMIN — HYDRALAZINE HYDROCHLORIDE 10 MG: 20 INJECTION INTRAMUSCULAR; INTRAVENOUS at 00:45

## 2021-01-01 RX ADMIN — Medication 10 ML: at 06:43

## 2021-01-01 RX ADMIN — DEXTROSE MONOHYDRATE AND SODIUM CHLORIDE 25 ML/HR: 5; .45 INJECTION, SOLUTION INTRAVENOUS at 11:00

## 2021-01-01 RX ADMIN — NITROGLYCERIN 1 INCH: 20 OINTMENT TOPICAL at 06:03

## 2021-01-01 RX ADMIN — ASPIRIN 300 MG: 300 SUPPOSITORY RECTAL at 08:23

## 2021-01-01 RX ADMIN — Medication 10 ML: at 23:11

## 2021-01-01 RX ADMIN — ASPIRIN 300 MG: 300 SUPPOSITORY RECTAL at 08:40

## 2021-01-01 RX ADMIN — LOSARTAN POTASSIUM 100 MG: 50 TABLET, FILM COATED ORAL at 13:53

## 2021-01-01 RX ADMIN — POLYETHYLENE GLYCOL 3350 17 G: 17 POWDER, FOR SOLUTION ORAL at 08:31

## 2021-01-01 RX ADMIN — HYDROCORTISONE SODIUM SUCCINATE 100 MG: 100 INJECTION, POWDER, FOR SOLUTION INTRAMUSCULAR; INTRAVENOUS at 16:22

## 2021-01-01 RX ADMIN — MINERAL OIL 133 ML: 100 ENEMA RECTAL at 10:22

## 2021-01-01 RX ADMIN — Medication 10 ML: at 10:11

## 2021-01-01 RX ADMIN — ACETAMINOPHEN 650 MG: 325 TABLET ORAL at 06:50

## 2021-01-01 RX ADMIN — NITROGLYCERIN 1 INCH: 20 OINTMENT TOPICAL at 13:47

## 2021-01-01 RX ADMIN — HYDROCORTISONE SODIUM SUCCINATE 100 MG: 100 INJECTION, POWDER, FOR SOLUTION INTRAMUSCULAR; INTRAVENOUS at 05:40

## 2021-01-01 RX ADMIN — ACETAMINOPHEN 650 MG: 325 TABLET ORAL at 21:17

## 2021-01-01 RX ADMIN — OXYCODONE HYDROCHLORIDE AND ACETAMINOPHEN 1 TABLET: 5; 325 TABLET ORAL at 16:17

## 2021-01-01 RX ADMIN — INSULIN LISPRO 2 UNITS: 100 INJECTION, SOLUTION INTRAVENOUS; SUBCUTANEOUS at 09:27

## 2021-01-01 RX ADMIN — INSULIN LISPRO 2 UNITS: 100 INJECTION, SOLUTION INTRAVENOUS; SUBCUTANEOUS at 23:18

## 2021-01-01 RX ADMIN — ENOXAPARIN SODIUM 30 MG: 30 INJECTION SUBCUTANEOUS at 10:03

## 2021-01-01 RX ADMIN — POLYETHYLENE GLYCOL 3350 17 G: 17 POWDER, FOR SOLUTION ORAL at 10:04

## 2021-01-01 RX ADMIN — DOCUSATE SODIUM 100 MG: 100 CAPSULE ORAL at 09:13

## 2021-01-01 RX ADMIN — GABAPENTIN 300 MG: 300 CAPSULE ORAL at 18:43

## 2021-01-01 RX ADMIN — HYDROCORTISONE SODIUM SUCCINATE 100 MG: 100 INJECTION, POWDER, FOR SOLUTION INTRAMUSCULAR; INTRAVENOUS at 05:50

## 2021-01-01 RX ADMIN — FUROSEMIDE 40 MG: 40 TABLET ORAL at 10:10

## 2021-01-01 RX ADMIN — JNJ-78436735 0.5 ML: 50000000000 SUSPENSION INTRAMUSCULAR at 15:35

## 2021-01-01 RX ADMIN — Medication 10 ML: at 21:23

## 2021-01-01 RX ADMIN — POLYETHYLENE GLYCOL 3350 17 G: 17 POWDER, FOR SOLUTION ORAL at 10:38

## 2021-01-01 RX ADMIN — LOSARTAN POTASSIUM 100 MG: 50 TABLET, FILM COATED ORAL at 10:10

## 2021-01-01 RX ADMIN — HYDROCORTISONE SODIUM SUCCINATE 100 MG: 100 INJECTION, POWDER, FOR SOLUTION INTRAMUSCULAR; INTRAVENOUS at 21:22

## 2021-01-01 RX ADMIN — Medication 10 ML: at 22:52

## 2021-01-01 RX ADMIN — PIPERACILLIN AND TAZOBACTAM 2.25 G: 2; .25 INJECTION, POWDER, LYOPHILIZED, FOR SOLUTION INTRAVENOUS at 03:03

## 2021-01-01 RX ADMIN — Medication 10 ML: at 21:35

## 2021-01-01 RX ADMIN — SODIUM CHLORIDE, SODIUM LACTATE, POTASSIUM CHLORIDE, AND CALCIUM CHLORIDE 500 ML: 600; 310; 30; 20 INJECTION, SOLUTION INTRAVENOUS at 11:15

## 2021-01-01 RX ADMIN — NITROGLYCERIN 1 INCH: 20 OINTMENT TOPICAL at 18:43

## 2021-01-01 RX ADMIN — METOPROLOL TARTRATE 25 MG: 25 TABLET, FILM COATED ORAL at 17:10

## 2021-01-01 RX ADMIN — LOSARTAN POTASSIUM 100 MG: 50 TABLET, FILM COATED ORAL at 08:09

## 2021-01-01 RX ADMIN — INSULIN LISPRO 2 UNITS: 100 INJECTION, SOLUTION INTRAVENOUS; SUBCUTANEOUS at 13:08

## 2021-01-01 RX ADMIN — ENOXAPARIN SODIUM 30 MG: 30 INJECTION SUBCUTANEOUS at 08:58

## 2021-01-01 RX ADMIN — METOPROLOL TARTRATE 25 MG: 25 TABLET, FILM COATED ORAL at 18:10

## 2021-01-01 RX ADMIN — OXYCODONE HYDROCHLORIDE AND ACETAMINOPHEN 1 TABLET: 5; 325 TABLET ORAL at 13:53

## 2021-01-01 RX ADMIN — HYDROCORTISONE SODIUM SUCCINATE 100 MG: 100 INJECTION, POWDER, FOR SOLUTION INTRAMUSCULAR; INTRAVENOUS at 13:21

## 2021-01-01 RX ADMIN — HYDRALAZINE HYDROCHLORIDE 10 MG: 20 INJECTION INTRAMUSCULAR; INTRAVENOUS at 08:22

## 2021-01-01 RX ADMIN — HYDROCORTISONE SODIUM SUCCINATE 100 MG: 100 INJECTION, POWDER, FOR SOLUTION INTRAMUSCULAR; INTRAVENOUS at 06:03

## 2021-01-01 RX ADMIN — DOCUSATE SODIUM 100 MG: 100 CAPSULE ORAL at 10:10

## 2021-01-01 RX ADMIN — HYDRALAZINE HYDROCHLORIDE 10 MG: 20 INJECTION INTRAMUSCULAR; INTRAVENOUS at 00:25

## 2021-01-01 RX ADMIN — METOPROLOL TARTRATE 100 MG: 50 TABLET, FILM COATED ORAL at 10:03

## 2021-01-01 RX ADMIN — ENOXAPARIN SODIUM 30 MG: 30 INJECTION SUBCUTANEOUS at 10:37

## 2021-01-01 RX ADMIN — HYDRALAZINE HYDROCHLORIDE 10 MG: 20 INJECTION INTRAMUSCULAR; INTRAVENOUS at 08:38

## 2021-01-01 RX ADMIN — POLYETHYLENE GLYCOL 3350 17 G: 17 POWDER, FOR SOLUTION ORAL at 08:09

## 2021-01-01 RX ADMIN — HYDRALAZINE HYDROCHLORIDE 10 MG: 20 INJECTION INTRAMUSCULAR; INTRAVENOUS at 15:54

## 2021-01-01 RX ADMIN — TROSPIUM CHLORIDE 20 MG: 20 TABLET, FILM COATED ORAL at 08:10

## 2021-01-01 RX ADMIN — PIPERACILLIN AND TAZOBACTAM 2.25 G: 2; .25 INJECTION, POWDER, LYOPHILIZED, FOR SOLUTION INTRAVENOUS at 08:40

## 2021-01-01 RX ADMIN — HYDROCORTISONE SODIUM SUCCINATE 100 MG: 100 INJECTION, POWDER, FOR SOLUTION INTRAMUSCULAR; INTRAVENOUS at 21:03

## 2021-01-01 RX ADMIN — PIPERACILLIN AND TAZOBACTAM 2.25 G: 2; .25 INJECTION, POWDER, LYOPHILIZED, FOR SOLUTION INTRAVENOUS at 01:48

## 2021-01-01 RX ADMIN — NALOXONE HYDROCHLORIDE 0.2 MG: 1 INJECTION PARENTERAL at 05:09

## 2021-01-01 RX ADMIN — DOCUSATE SODIUM 100 MG: 100 CAPSULE ORAL at 08:59

## 2021-01-01 RX ADMIN — NITROGLYCERIN 1 INCH: 20 OINTMENT TOPICAL at 06:07

## 2021-01-01 RX ADMIN — METOPROLOL TARTRATE 100 MG: 50 TABLET, FILM COATED ORAL at 08:10

## 2021-01-01 RX ADMIN — THIAMINE HYDROCHLORIDE 100 MG: 100 INJECTION, SOLUTION INTRAMUSCULAR; INTRAVENOUS at 09:59

## 2021-01-01 RX ADMIN — DOCUSATE SODIUM 100 MG: 100 CAPSULE ORAL at 10:37

## 2021-01-01 RX ADMIN — Medication 10 ML: at 00:31

## 2021-01-01 RX ADMIN — Medication 10 ML: at 07:04

## 2021-01-01 RX ADMIN — THIAMINE HYDROCHLORIDE 100 MG: 100 INJECTION, SOLUTION INTRAMUSCULAR; INTRAVENOUS at 18:01

## 2021-01-01 RX ADMIN — TROSPIUM CHLORIDE 20 MG: 20 TABLET, FILM COATED ORAL at 10:03

## 2021-01-01 RX ADMIN — GABAPENTIN 300 MG: 300 CAPSULE ORAL at 10:19

## 2021-01-01 RX ADMIN — FAMOTIDINE 20 MG: 10 INJECTION INTRAVENOUS at 15:28

## 2021-01-01 RX ADMIN — GABAPENTIN 300 MG: 300 CAPSULE ORAL at 22:52

## 2021-01-01 RX ADMIN — METOPROLOL TARTRATE 100 MG: 50 TABLET, FILM COATED ORAL at 19:02

## 2021-01-01 RX ADMIN — VANCOMYCIN HYDROCHLORIDE 750 MG: 750 INJECTION, POWDER, LYOPHILIZED, FOR SOLUTION INTRAVENOUS at 14:00

## 2021-01-01 RX ADMIN — Medication 10 ML: at 23:18

## 2021-01-01 RX ADMIN — HYDROCORTISONE SODIUM SUCCINATE 100 MG: 100 INJECTION, POWDER, FOR SOLUTION INTRAMUSCULAR; INTRAVENOUS at 23:00

## 2021-01-01 RX ADMIN — ACETAMINOPHEN 650 MG: 650 SUPPOSITORY RECTAL at 21:07

## 2021-01-01 RX ADMIN — Medication 10 ML: at 14:05

## 2021-01-01 RX ADMIN — THIAMINE HYDROCHLORIDE 100 MG: 100 INJECTION, SOLUTION INTRAMUSCULAR; INTRAVENOUS at 19:01

## 2021-01-01 RX ADMIN — DEXTROSE MONOHYDRATE AND SODIUM CHLORIDE 100 ML/HR: 5; .45 INJECTION, SOLUTION INTRAVENOUS at 05:50

## 2021-01-01 RX ADMIN — Medication 10 ML: at 06:04

## 2021-01-01 RX ADMIN — Medication 10 ML: at 15:41

## 2021-01-01 RX ADMIN — VANCOMYCIN HYDROCHLORIDE 750 MG: 750 INJECTION, POWDER, LYOPHILIZED, FOR SOLUTION INTRAVENOUS at 12:59

## 2021-01-01 RX ADMIN — POLYETHYLENE GLYCOL 3350 17 G: 17 POWDER, FOR SOLUTION ORAL at 09:03

## 2021-01-01 RX ADMIN — ENOXAPARIN SODIUM 30 MG: 30 INJECTION SUBCUTANEOUS at 10:19

## 2021-01-01 RX ADMIN — Medication 5 ML: at 06:16

## 2021-01-01 RX ADMIN — GABAPENTIN 300 MG: 300 CAPSULE ORAL at 18:09

## 2021-01-01 RX ADMIN — Medication 10 ML: at 21:53

## 2021-01-01 RX ADMIN — METOPROLOL TARTRATE 25 MG: 25 TABLET, FILM COATED ORAL at 09:03

## 2021-01-01 RX ADMIN — PIPERACILLIN AND TAZOBACTAM 2.25 G: 2; .25 INJECTION, POWDER, LYOPHILIZED, FOR SOLUTION INTRAVENOUS at 21:02

## 2021-01-01 RX ADMIN — LOSARTAN POTASSIUM 50 MG: 50 TABLET, FILM COATED ORAL at 09:12

## 2021-01-01 RX ADMIN — NITROGLYCERIN 1 INCH: 20 OINTMENT TOPICAL at 11:47

## 2021-01-01 RX ADMIN — GLYCOPYRROLATE 0.2 MG: 0.2 INJECTION INTRAMUSCULAR; INTRAVENOUS at 12:02

## 2021-01-01 RX ADMIN — DEXTROSE MONOHYDRATE AND SODIUM CHLORIDE 100 ML/HR: 5; .45 INJECTION, SOLUTION INTRAVENOUS at 17:55

## 2021-01-01 RX ADMIN — DEXTROSE MONOHYDRATE 25 G: 25 INJECTION, SOLUTION INTRAVENOUS at 02:03

## 2021-01-01 RX ADMIN — HYOSCYAMINE SULFATE 0.12 MG: 0.12 TABLET SUBLINGUAL at 12:15

## 2021-01-01 RX ADMIN — INSULIN LISPRO 2 UNITS: 100 INJECTION, SOLUTION INTRAVENOUS; SUBCUTANEOUS at 12:21

## 2021-01-01 RX ADMIN — DOCUSATE SODIUM 100 MG: 100 CAPSULE ORAL at 10:19

## 2021-01-01 RX ADMIN — HYDRALAZINE HYDROCHLORIDE 10 MG: 20 INJECTION INTRAMUSCULAR; INTRAVENOUS at 02:31

## 2021-01-01 RX ADMIN — Medication 10 ML: at 19:07

## 2021-01-01 RX ADMIN — INSULIN LISPRO 2 UNITS: 100 INJECTION, SOLUTION INTRAVENOUS; SUBCUTANEOUS at 15:57

## 2021-01-01 RX ADMIN — FUROSEMIDE 40 MG: 10 INJECTION, SOLUTION INTRAMUSCULAR; INTRAVENOUS at 08:10

## 2021-01-01 RX ADMIN — LOSARTAN POTASSIUM 50 MG: 50 TABLET, FILM COATED ORAL at 10:37

## 2021-01-01 RX ADMIN — POLYETHYLENE GLYCOL 3350 17 G: 17 POWDER, FOR SOLUTION ORAL at 10:19

## 2021-01-01 RX ADMIN — HYDROCORTISONE SODIUM SUCCINATE 100 MG: 100 INJECTION, POWDER, FOR SOLUTION INTRAMUSCULAR; INTRAVENOUS at 21:02

## 2021-01-01 RX ADMIN — HYDRALAZINE HYDROCHLORIDE 10 MG: 20 INJECTION INTRAMUSCULAR; INTRAVENOUS at 08:31

## 2021-01-01 RX ADMIN — THIAMINE HYDROCHLORIDE 100 MG: 100 INJECTION, SOLUTION INTRAMUSCULAR; INTRAVENOUS at 17:32

## 2021-01-01 RX ADMIN — SODIUM CHLORIDE, SODIUM LACTATE, POTASSIUM CHLORIDE, AND CALCIUM CHLORIDE 1000 ML: 600; 310; 30; 20 INJECTION, SOLUTION INTRAVENOUS at 13:25

## 2021-01-01 RX ADMIN — PIPERACILLIN AND TAZOBACTAM 2.25 G: 2; .25 INJECTION, POWDER, LYOPHILIZED, FOR SOLUTION INTRAVENOUS at 13:35

## 2021-01-01 RX ADMIN — INSULIN LISPRO 2 UNITS: 100 INJECTION, SOLUTION INTRAVENOUS; SUBCUTANEOUS at 18:21

## 2021-01-01 RX ADMIN — GABAPENTIN 300 MG: 300 CAPSULE ORAL at 10:10

## 2021-01-01 RX ADMIN — ASPIRIN 300 MG: 300 SUPPOSITORY RECTAL at 14:00

## 2021-01-01 RX ADMIN — Medication 10 ML: at 15:06

## 2021-01-01 RX ADMIN — PIPERACILLIN AND TAZOBACTAM 2.25 G: 2; .25 INJECTION, POWDER, LYOPHILIZED, FOR SOLUTION INTRAVENOUS at 08:17

## 2021-01-01 RX ADMIN — VANCOMYCIN HYDROCHLORIDE 750 MG: 750 INJECTION, POWDER, LYOPHILIZED, FOR SOLUTION INTRAVENOUS at 15:00

## 2021-01-01 RX ADMIN — Medication 10 ML: at 13:26

## 2021-01-01 RX ADMIN — SODIUM CHLORIDE 500 ML: 900 INJECTION, SOLUTION INTRAVENOUS at 12:01

## 2021-01-01 RX ADMIN — GABAPENTIN 300 MG: 300 CAPSULE ORAL at 09:03

## 2021-01-01 RX ADMIN — DEXTROSE MONOHYDRATE AND SODIUM CHLORIDE 100 ML/HR: 5; .45 INJECTION, SOLUTION INTRAVENOUS at 06:04

## 2021-01-01 RX ADMIN — HYDRALAZINE HYDROCHLORIDE 10 MG: 20 INJECTION INTRAMUSCULAR; INTRAVENOUS at 13:30

## 2021-01-01 RX ADMIN — HYDRALAZINE HYDROCHLORIDE 20 MG: 20 INJECTION INTRAMUSCULAR; INTRAVENOUS at 21:23

## 2021-01-01 RX ADMIN — FAMOTIDINE 20 MG: 10 INJECTION INTRAVENOUS at 08:35

## 2021-01-01 RX ADMIN — NITROGLYCERIN 1 INCH: 20 OINTMENT TOPICAL at 08:32

## 2021-01-01 RX ADMIN — MORPHINE SULFATE 5 MG: 10 SOLUTION ORAL at 00:45

## 2021-01-01 RX ADMIN — Medication 10 ML: at 13:47

## 2021-01-01 RX ADMIN — Medication 10 ML: at 05:54

## 2021-01-01 RX ADMIN — THIAMINE HYDROCHLORIDE 100 MG: 100 INJECTION, SOLUTION INTRAMUSCULAR; INTRAVENOUS at 11:47

## 2021-01-01 RX ADMIN — AMLODIPINE BESYLATE 10 MG: 5 TABLET ORAL at 13:53

## 2021-01-01 RX ADMIN — INSULIN LISPRO 2 UNITS: 100 INJECTION, SOLUTION INTRAVENOUS; SUBCUTANEOUS at 14:03

## 2021-01-01 RX ADMIN — HYDRALAZINE HYDROCHLORIDE 20 MG: 20 INJECTION INTRAMUSCULAR; INTRAVENOUS at 16:26

## 2021-01-01 RX ADMIN — Medication 10 ML: at 07:00

## 2021-01-01 RX ADMIN — LOSARTAN POTASSIUM 50 MG: 50 TABLET, FILM COATED ORAL at 08:27

## 2021-01-01 RX ADMIN — Medication 10 ML: at 13:30

## 2021-01-01 RX ADMIN — Medication 10 ML: at 22:40

## 2021-01-01 RX ADMIN — GABAPENTIN 300 MG: 300 CAPSULE ORAL at 08:59

## 2021-01-01 RX ADMIN — FUROSEMIDE 40 MG: 10 INJECTION, SOLUTION INTRAMUSCULAR; INTRAVENOUS at 10:03

## 2021-01-01 RX ADMIN — Medication 10 ML: at 21:15

## 2021-01-01 RX ADMIN — DEXTROSE MONOHYDRATE AND SODIUM CHLORIDE 100 ML/HR: 5; .45 INJECTION, SOLUTION INTRAVENOUS at 15:59

## 2021-01-01 RX ADMIN — HYDRALAZINE HYDROCHLORIDE 20 MG: 20 INJECTION INTRAMUSCULAR; INTRAVENOUS at 13:35

## 2021-01-01 RX ADMIN — INSULIN LISPRO 2 UNITS: 100 INJECTION, SOLUTION INTRAVENOUS; SUBCUTANEOUS at 13:40

## 2021-01-01 RX ADMIN — GABAPENTIN 300 MG: 300 CAPSULE ORAL at 17:10

## 2021-01-01 RX ADMIN — METOPROLOL TARTRATE 25 MG: 25 TABLET, FILM COATED ORAL at 18:09

## 2021-01-01 RX ADMIN — ENOXAPARIN SODIUM 30 MG: 30 INJECTION SUBCUTANEOUS at 11:18

## 2021-01-01 RX ADMIN — ERGOCALCIFEROL 50000 UNITS: 1.25 CAPSULE ORAL at 08:27

## 2021-01-01 RX ADMIN — PIPERACILLIN AND TAZOBACTAM 2.25 G: 2; .25 INJECTION, POWDER, LYOPHILIZED, FOR SOLUTION INTRAVENOUS at 19:53

## 2021-01-01 RX ADMIN — POLYETHYLENE GLYCOL 3350 17 G: 17 POWDER, FOR SOLUTION ORAL at 10:09

## 2021-01-01 RX ADMIN — METOPROLOL TARTRATE 25 MG: 25 TABLET, FILM COATED ORAL at 08:58

## 2021-01-01 RX ADMIN — ACETAMINOPHEN 650 MG: 650 SUPPOSITORY RECTAL at 04:12

## 2021-01-01 RX ADMIN — Medication 10 ML: at 13:32

## 2021-01-01 RX ADMIN — NITROGLYCERIN 1 INCH: 20 OINTMENT TOPICAL at 20:43

## 2021-01-01 RX ADMIN — ASPIRIN 300 MG: 300 SUPPOSITORY RECTAL at 08:36

## 2021-01-01 RX ADMIN — GABAPENTIN 300 MG: 300 CAPSULE ORAL at 19:02

## 2021-01-01 RX ADMIN — DOCUSATE SODIUM 100 MG: 100 CAPSULE ORAL at 19:02

## 2021-01-01 RX ADMIN — GABAPENTIN 300 MG: 300 CAPSULE ORAL at 10:03

## 2021-01-01 RX ADMIN — HYDRALAZINE HYDROCHLORIDE 10 MG: 20 INJECTION INTRAMUSCULAR; INTRAVENOUS at 14:18

## 2021-01-01 RX ADMIN — NITROGLYCERIN 1 INCH: 20 OINTMENT TOPICAL at 00:45

## 2021-01-01 RX ADMIN — DOCUSATE SODIUM 100 MG: 100 CAPSULE ORAL at 17:31

## 2021-01-01 RX ADMIN — NITROGLYCERIN 1 INCH: 20 OINTMENT TOPICAL at 05:40

## 2021-01-01 RX ADMIN — MORPHINE SULFATE 5 MG: 10 SOLUTION ORAL at 08:34

## 2021-01-01 RX ADMIN — DEXTROSE MONOHYDRATE 50 ML/HR: 5 INJECTION, SOLUTION INTRAVENOUS at 02:03

## 2021-01-01 RX ADMIN — PIPERACILLIN AND TAZOBACTAM 3.38 G: 3; .375 INJECTION, POWDER, LYOPHILIZED, FOR SOLUTION INTRAVENOUS at 11:58

## 2021-01-01 RX ADMIN — ENOXAPARIN SODIUM 30 MG: 30 INJECTION SUBCUTANEOUS at 08:28

## 2021-01-01 RX ADMIN — HYOSCYAMINE SULFATE 0.12 MG: 0.12 TABLET SUBLINGUAL at 10:45

## 2021-01-01 RX ADMIN — Medication 10 ML: at 23:02

## 2021-01-01 RX ADMIN — SODIUM CHLORIDE 75 ML/HR: 900 INJECTION, SOLUTION INTRAVENOUS at 12:17

## 2021-01-01 RX ADMIN — VANCOMYCIN HYDROCHLORIDE 750 MG: 750 INJECTION, POWDER, LYOPHILIZED, FOR SOLUTION INTRAVENOUS at 20:04

## 2021-01-01 RX ADMIN — SODIUM CHLORIDE 50 ML/HR: 900 INJECTION, SOLUTION INTRAVENOUS at 05:41

## 2021-01-01 RX ADMIN — HYDRALAZINE HYDROCHLORIDE 10 MG: 20 INJECTION INTRAMUSCULAR; INTRAVENOUS at 20:30

## 2021-01-01 RX ADMIN — GABAPENTIN 300 MG: 300 CAPSULE ORAL at 09:13

## 2021-01-01 RX ADMIN — Medication 10 ML: at 21:03

## 2021-01-01 RX ADMIN — Medication 10 ML: at 06:03

## 2021-01-01 RX ADMIN — NITROGLYCERIN 1 INCH: 20 OINTMENT TOPICAL at 17:27

## 2021-01-01 RX ADMIN — SODIUM CHLORIDE 75 ML/HR: 900 INJECTION, SOLUTION INTRAVENOUS at 03:00

## 2021-01-01 RX ADMIN — METOPROLOL TARTRATE 25 MG: 25 TABLET, FILM COATED ORAL at 18:43

## 2021-01-01 RX ADMIN — ENOXAPARIN SODIUM 30 MG: 30 INJECTION SUBCUTANEOUS at 08:09

## 2021-01-01 RX ADMIN — LOSARTAN POTASSIUM 100 MG: 50 TABLET, FILM COATED ORAL at 10:03

## 2021-01-01 RX ADMIN — NITROGLYCERIN 1 INCH: 20 OINTMENT TOPICAL at 13:00

## 2021-01-01 RX ADMIN — DOCUSATE SODIUM 100 MG: 100 CAPSULE ORAL at 18:11

## 2021-01-01 RX ADMIN — GABAPENTIN 300 MG: 300 CAPSULE ORAL at 17:45

## 2021-01-01 RX ADMIN — Medication 10 ML: at 09:04

## 2021-01-01 RX ADMIN — METOPROLOL TARTRATE 25 MG: 25 TABLET, FILM COATED ORAL at 10:37

## 2021-01-01 RX ADMIN — NITROGLYCERIN 1 INCH: 20 OINTMENT TOPICAL at 00:24

## 2021-01-01 RX ADMIN — INSULIN LISPRO 2 UNITS: 100 INJECTION, SOLUTION INTRAVENOUS; SUBCUTANEOUS at 13:05

## 2021-01-01 RX ADMIN — POLYETHYLENE GLYCOL 3350 17 G: 17 POWDER, FOR SOLUTION ORAL at 06:34

## 2021-01-01 RX ADMIN — Medication 10 ML: at 07:41

## 2021-01-01 RX ADMIN — NITROGLYCERIN 1 INCH: 20 OINTMENT TOPICAL at 14:00

## 2021-01-01 RX ADMIN — GABAPENTIN 300 MG: 300 CAPSULE ORAL at 08:10

## 2021-01-01 RX ADMIN — Medication 10 ML: at 23:01

## 2021-01-01 RX ADMIN — ASPIRIN 300 MG: 300 SUPPOSITORY RECTAL at 08:08

## 2021-01-01 RX ADMIN — HYDROCORTISONE SODIUM SUCCINATE 100 MG: 100 INJECTION, POWDER, FOR SOLUTION INTRAMUSCULAR; INTRAVENOUS at 13:08

## 2021-01-01 RX ADMIN — DOCUSATE SODIUM 100 MG: 100 CAPSULE ORAL at 10:03

## 2021-01-01 RX ADMIN — MORPHINE SULFATE 5 MG: 10 SOLUTION ORAL at 23:42

## 2021-01-01 RX ADMIN — METOPROLOL TARTRATE 100 MG: 50 TABLET, FILM COATED ORAL at 10:10

## 2021-01-01 RX ADMIN — HYOSCYAMINE SULFATE 0.12 MG: 0.12 TABLET ORAL; SUBLINGUAL at 08:16

## 2021-01-01 RX ADMIN — NITROGLYCERIN 1 INCH: 20 OINTMENT TOPICAL at 05:54

## 2021-01-01 RX ADMIN — NITROGLYCERIN 1 INCH: 20 OINTMENT TOPICAL at 23:42

## 2021-01-01 RX ADMIN — TROSPIUM CHLORIDE 20 MG: 20 TABLET, FILM COATED ORAL at 10:09

## 2021-01-01 RX ADMIN — HYDRALAZINE HYDROCHLORIDE 20 MG: 20 INJECTION INTRAMUSCULAR; INTRAVENOUS at 15:28

## 2021-01-01 RX ADMIN — NITROGLYCERIN 1 INCH: 20 OINTMENT TOPICAL at 23:46

## 2021-01-01 RX ADMIN — Medication 10 ML: at 13:55

## 2021-01-01 RX ADMIN — ERGOCALCIFEROL 50000 UNITS: 1.25 CAPSULE ORAL at 08:10

## 2021-01-01 RX ADMIN — GABAPENTIN 300 MG: 300 CAPSULE ORAL at 18:10

## 2021-01-01 RX ADMIN — METOPROLOL TARTRATE 25 MG: 25 TABLET, FILM COATED ORAL at 17:31

## 2021-02-11 NOTE — ED PROVIDER NOTES
EMERGENCY DEPARTMENT HISTORY AND PHYSICAL EXAM 
 
Date: 2/11/2021 Patient Name: Zelda Khan History of Presenting Illness Chief Complaint Patient presents with  Leg Swelling History Provided By: Patient Additional History (Context): Zelda Khan is a 80 y.o. female with diabetes, hypertension, hyperlipidemia, obesity, osteoarthritis and hepatitis C who presents with left leg pain; chronic but states that for the past 3 to 4 days it has been so painful and swollen that it is hard to stand. Fever trauma numbness weakness. Today to her PCP and they did an x-ray. PCP: Monica Salmeron MD 
 
Current Outpatient Medications Medication Sig Dispense Refill  acetaminophen-codeine (Tylenol-Codeine #3) 300-30 mg per tablet Take 1 Tab by mouth every six (6) hours as needed for Pain for up to 14 days. Max Daily Amount: 4 Tabs. 18 Tab 0  
 solifenacin (VESICARE) 10 mg tablet Take 1 Tab by mouth daily. 90 Tab 3  
 traMADol (ULTRAM) 50 mg tablet Take 50 mg by mouth every six (6) hours as needed for Pain.  amLODIPine (NORVASC) 10 mg tablet Take  by mouth daily.  lisinopril (PRINIVIL, ZESTRIL) 10 mg tablet Take  by mouth daily.  ergocalciferol (VITAMIN D2) 50,000 unit capsule Take 50,000 Units by mouth.  acyclovir (ZOVIRAX) 200 mg capsule Take  by mouth every four (4) hours (while awake).  metoprolol (LOPRESSOR) 100 mg tablet Take  by mouth two (2) times a day.  gabapentin (NEURONTIN) 300 mg capsule Take 300 mg by mouth three (3) times daily.  losartan (COZAAR) 100 mg tablet Take 100 mg by mouth daily. Past History Past Medical History: 
Past Medical History:  
Diagnosis Date  Anxiety  Arthritis  Diabetes ([address]  Edema of extremities  Hematologic disorder  Hepatitis C carrier [address] [address]  Hypercholesterolemia  Hypertension  Insomnia  Labyrinthitis  Menopause  Primary osteoarthritis of knees, bilateral   
 Urge incontinence  Vitamin D deficiency Past Surgical History: 
Past Surgical History:  
Procedure Laterality Date [address]  HX OTHER SURGICAL  2007 Lap Band for weight loss Family History: 
Family History Problem Relation Age of Onset  Breast Cancer Sister 80  
 Ovarian Cancer Sister 30  
     31s Social History: 
Social History Tobacco Use  Smoking status: Never Smoker  Smokeless tobacco: Never Used Substance Use Topics  Alcohol use: No  
 Drug use: No  
 
 
Allergies: Allergies Allergen Reactions  Contrast Dye [Iodine] Other (comments) Neurological reaction Review of Systems Review of Systems Cardiovascular: Positive for leg swelling. Musculoskeletal: Positive for gait problem and myalgias. Skin: Negative for color change. Neurological: Negative for weakness and numbness. All Other Systems Negative Physical Exam  
 
Vitals:  
 02/11/21 1319 02/11/21 1511 BP: (!) 238/133 (!) 185/136 Pulse: 91 85 Resp: 16 Temp: 98.1 °F (36.7 °C) SpO2: 100% 99% Weight: 88.5 kg (195 lb) Height: 5' 4\" (1.626 m) Physical Exam 
Vitals signs and nursing note reviewed. Constitutional:   
   Appearance: She is well-developed. She is obese. HENT:  
   Head: Normocephalic and atraumatic. Right Ear: External ear normal.  
   Left Ear: External ear normal.  
   Nose: Nose normal.  
Eyes:  
   Conjunctiva/sclera: Conjunctivae normal.  
   Pupils: Pupils are equal, round, and reactive to light. Neck: Musculoskeletal: Normal range of motion and neck supple. Vascular: No JVD. Trachea: No tracheal deviation. Cardiovascular:  
   Rate and Rhythm: Normal rate and regular rhythm. Heart sounds: Normal heart sounds. No murmur. No friction rub. No gallop. Pulmonary:  
   Effort: Pulmonary effort is normal. No respiratory distress. Breath sounds: Normal breath sounds. No wheezing or rales. Abdominal:  
   General: Bowel sounds are normal. There is no distension. Palpations: Abdomen is soft. There is no mass. Tenderness: There is no abdominal tenderness. There is no guarding or rebound. Musculoskeletal: Normal range of motion. General: Tenderness present. Comments: Her anterior distal lower leg is warm to touch and tender. Is on the anterolateral aspect of the leg above the knee. Her knee joint is diffusely tender. She also has TTP of the medial anterior distal proximal third of the lower leg. She has pain w/flexion. small effusion. DP PT pulses palpable. Skin: 
   General: Skin is warm and dry. Findings: No rash. Neurological:  
   Mental Status: She is alert and oriented to person, place, and time. Cranial Nerves: No cranial nerve deficit. Deep Tendon Reflexes: Reflexes are normal and symmetric. Psychiatric:     
   Behavior: Behavior normal.  
 
  
 
Diagnostic Study Results Labs - Recent Results (from the past 12 hour(s)) CBC WITH AUTOMATED DIFF Collection Time: 02/11/21  2:01 PM  
Result Value Ref Range WBC 7.7 4.6 - 13.2 K/uL  
 RBC 4.20 4. 20 - 5.30 M/uL  
 HGB 11.8 (L) 12.0 - 16.0 g/dL HCT 36.3 35.0 - 45.0 % MCV 86.4 74.0 - 97.0 FL  
 MCH 28.1 24.0 - 34.0 PG  
 MCHC 32.5 31.0 - 37.0 g/dL  
 RDW 13.4 11.6 - 14.5 % PLATELET 806 621 - 700 K/uL MPV 10.6 9.2 - 11.8 FL  
 NEUTROPHILS 72 40 - 73 % LYMPHOCYTES 19 (L) 21 - 52 % MONOCYTES 8 3 - 10 % EOSINOPHILS 1 0 - 5 % BASOPHILS 0 0 - 2 %  
 ABS. NEUTROPHILS 5.6 1.8 - 8.0 K/UL  
 ABS. LYMPHOCYTES 1.5 0.9 - 3.6 K/UL  
 ABS. MONOCYTES 0.6 0.05 - 1.2 K/UL  
 ABS. EOSINOPHILS 0.0 0.0 - 0.4 K/UL  
 ABS. BASOPHILS 0.0 0.0 - 0.1 K/UL  
 DF AUTOMATED METABOLIC PANEL, COMPREHENSIVE Collection Time: 02/11/21  2:01 PM  
Result Value Ref Range  Sodium 138 136 - 145 mmol/L  
 Potassium 4.4 3.5 - 5.5 mmol/L Chloride 106 100 - 111 mmol/L  
 CO2 28 21 - 32 mmol/L Anion gap 4 3.0 - 18 mmol/L Glucose 168 (H) 74 - 99 mg/dL BUN 24 (H) 7.0 - 18 MG/DL Creatinine 1.84 (H) 0.6 - 1.3 MG/DL  
 BUN/Creatinine ratio 13 12 - 20 GFR est AA 32 (L) >60 ml/min/1.73m2 GFR est non-AA 26 (L) >60 ml/min/1.73m2 Calcium 9.1 8.5 - 10.1 MG/DL Bilirubin, total 0.4 0.2 - 1.0 MG/DL  
 ALT (SGPT) 18 13 - 56 U/L  
 AST (SGOT) 18 10 - 38 U/L Alk. phosphatase 115 45 - 117 U/L Protein, total 7.9 6.4 - 8.2 g/dL Albumin 3.2 (L) 3.4 - 5.0 g/dL Globulin 4.7 (H) 2.0 - 4.0 g/dL A-G Ratio 0.7 (L) 0.8 - 1.7 Radiologic Studies -  
XR KNEE LT MIN 4 V Final Result 1. No definite evidence of acute fracture, given osteopenia. 2.  Advanced degenerative changes as above, with slight interval progression  
from 2016. Chondrocalcinosis which may reflect underlying CPPD arthropathy. 3.  Small to moderate sized joint effusion. CT Results  (Last 48 hours) None CXR Results  (Last 48 hours) None Medical Decision Making I am the first provider for this patient. I reviewed the vital signs, available nursing notes, past medical history, past surgical history, family history and social history. Vital Signs-Reviewed the patient's vital signs. Procedures: 
Procedures Provider Notes (Medical Decision Making): Not DVT otherwise treat her cellulitis. Have asked her primary care office to send over a copy of x-ray report. Severe OA on x-rays. No concern on PE for septic joint -- pt also evaluated by ED attending. Spoke with her PCP Dr. Aishwarya Robertson by phone. Would likely qaulify for knee replacement -- refer to ortho as well. BP improved w/her home meds. MED RECONCILIATION: 
No current facility-administered medications for this encounter. Current Outpatient Medications Medication Sig  
  acetaminophen-codeine (Tylenol-Codeine #3) 300-30 mg per tablet Take 1 Tab by mouth every six (6) hours as needed for Pain for up to 14 days. Max Daily Amount: 4 Tabs.  solifenacin (VESICARE) 10 mg tablet Take 1 Tab by mouth daily.  traMADol (ULTRAM) 50 mg tablet Take 50 mg by mouth every six (6) hours as needed for Pain.  amLODIPine (NORVASC) 10 mg tablet Take  by mouth daily.  lisinopril (PRINIVIL, ZESTRIL) 10 mg tablet Take  by mouth daily.  ergocalciferol (VITAMIN D2) 50,000 unit capsule Take 50,000 Units by mouth.  acyclovir (ZOVIRAX) 200 mg capsule Take  by mouth every four (4) hours (while awake).  metoprolol (LOPRESSOR) 100 mg tablet Take  by mouth two (2) times a day.  gabapentin (NEURONTIN) 300 mg capsule Take 300 mg by mouth three (3) times daily.  losartan (COZAAR) 100 mg tablet Take 100 mg by mouth daily. Disposition: 
home DISCHARGE NOTE:  
3:47 PM 
 
Pt has been reexamined. Patient has no new complaints, changes, or physical findings. Care plan outlined and precautions discussed. Results of x-rays, labs were reviewed with the patient. All medications were reviewed with the patient; will d/c home with Tylenol w/codeine. All of pt's questions and concerns were addressed. Patient was instructed and agrees to follow up with ortho, as well as to return to the ED upon further deterioration. Patient is ready to go home. Follow-up Information Follow up With Specialties Details Why Contact Info Uriel Bradley MD Family Medicine Schedule an appointment as soon as possible for a visit in 1 day  [address] [place] [address] [phone] Ayde Espinoza MD Orthopedic Surgery Schedule an appointment as soon as possible for a visit in 1 day  [place] [id number] [phone] [id number] [facility] EMERGENCY DEPT Emergency Medicine  If symptoms worsen return immediately [address] [place] [id number] [phone] Current Discharge Medication List  
  
START taking these medications Details  
acetaminophen-codeine (Tylenol-Codeine #3) 300-30 mg per tablet Take 1 Tab by mouth every six (6) hours as needed for Pain for up to 14 days. Max Daily Amount: 4 Tabs. Qty: 18 Tab, Refills: 0 Associated Diagnoses: Osteoarthritis of left knee, unspecified osteoarthritis type Diagnosis Clinical Impression: 1. Osteoarthritis of left knee, unspecified osteoarthritis type 2. Hypertension, unspecified type

## 2021-02-11 NOTE — ED TRIAGE NOTES
Patient c/o swelling to left lower leg and knee x three weeks. She denies injury. States difficulty walking related to pain in leg.

## 2021-06-25 NOTE — ED NOTES
Felicita Mcnair is a 80 y.o. female that was discharged in stable condition. The patients diagnosis, condition and treatment were explained to  patient and aftercare instructions were given. The patient verbalized understanding. Patient armband removed and shredded.

## 2021-06-25 NOTE — ED PROVIDER NOTES
EMERGENCY DEPARTMENT HISTORY AND PHYSICAL EXAM    12:20 PM patient seen at this time in room 11      Date: 6/25/2021  Patient Name: Vadim Rodriguez    History of Presenting Illness     Chief Complaint   Patient presents with    Fall    Knee Pain    Head Injury         History Provided By: patient    Additional History (Context): Vadim Rodriguez is a 80 y.o. female presents with has had increasing weakness. She has had problems with her legs and knees for many years but worse recently. She has had falls. She has had imaging of her left knee which is where she has most of her pain she also struck her occiput rates her pain is severe. Worse with bearing weight. PCP: Nubia Levy MD    Chief Complaint:   Duration:    Timing:    Location:   Quality:   Severity:   Modifying Factors:   Associated Symptoms:       Current Outpatient Medications   Medication Sig Dispense Refill    oxyCODONE-acetaminophen (Percocet) 5-325 mg per tablet Take 1 Tablet by mouth every four (4) hours as needed for Pain for up to 3 days. Max Daily Amount: 6 Tablets. 10 Tablet 0    solifenacin (VESICARE) 10 mg tablet Take 1 Tab by mouth daily. 90 Tab 3    traMADol (ULTRAM) 50 mg tablet Take 50 mg by mouth every six (6) hours as needed for Pain.  amLODIPine (NORVASC) 10 mg tablet Take  by mouth daily.  lisinopril (PRINIVIL, ZESTRIL) 10 mg tablet Take  by mouth daily.  ergocalciferol (VITAMIN D2) 50,000 unit capsule Take 50,000 Units by mouth.  acyclovir (ZOVIRAX) 200 mg capsule Take  by mouth every four (4) hours (while awake).  metoprolol (LOPRESSOR) 100 mg tablet Take  by mouth two (2) times a day.  gabapentin (NEURONTIN) 300 mg capsule Take 300 mg by mouth three (3) times daily.  losartan (COZAAR) 100 mg tablet Take 100 mg by mouth daily.          Past History     Past Medical History:  Past Medical History:   Diagnosis Date    Anxiety     Arthritis     Diabetes ([facility] [address]     Edema of extremities     Hematologic disorder     Hepatitis C carrier ([facility] [address]     Hypercholesterolemia     Hypertension     Insomnia     Labyrinthitis     Menopause     Primary osteoarthritis of knees, bilateral     Urge incontinence     Vitamin D deficiency        Past Surgical History:  Past Surgical History:   Procedure Laterality Date    HX HYSTERECTOMY  36    HX OTHER SURGICAL  2007    Lap Band for weight loss       Family History:  Family History   Problem Relation Age of Onset    Breast Cancer Sister 80    Ovarian Cancer Sister 33        33s       Social History:  Social History     Tobacco Use    Smoking status: Never Smoker    Smokeless tobacco: Never Used   Substance Use Topics    Alcohol use: No    Drug use: No       Allergies: Allergies   Allergen Reactions    Contrast Dye [Iodine] Other (comments)     Neurological reaction         Review of Systems     Review of Systems   Constitutional: Negative for diaphoresis and fever. HENT: Negative for congestion and sore throat. Eyes: Negative for pain and itching. Respiratory: Negative for cough and shortness of breath. Cardiovascular: Negative for chest pain and palpitations. Gastrointestinal: Negative for abdominal pain and diarrhea. Endocrine: Negative for polydipsia and polyuria. Genitourinary: Negative for dysuria and hematuria. Musculoskeletal: Positive for arthralgias. Negative for myalgias. Skin: Negative for rash and wound. Neurological: Negative for seizures and syncope. Hematological: Does not bruise/bleed easily. Psychiatric/Behavioral: Negative for agitation and hallucinations. Physical Exam       Patient Vitals for the past 12 hrs:   Temp Pulse Resp BP SpO2   06/25/21 1138 98.4 °F (36.9 °C) (!) 109 16 (!) 147/83 99 %       Physical Exam  Vitals and nursing note reviewed. Constitutional:       General: She is not in acute distress. Appearance: Normal appearance. She is well-developed.  She is not ill-appearing. HENT:      Head: Normocephalic and atraumatic. Comments: No appreciable deformity or other abnormality to the head  Eyes:      General: No scleral icterus. Conjunctiva/sclera: Conjunctivae normal.   Neck:      Vascular: No JVD. Cardiovascular:      Rate and Rhythm: Normal rate and regular rhythm. Heart sounds: Normal heart sounds. Comments: 4 intact extremity pulses  Pulmonary:      Effort: Pulmonary effort is normal.      Breath sounds: Normal breath sounds. Abdominal:      Palpations: Abdomen is soft. There is no mass. Tenderness: There is no abdominal tenderness. Musculoskeletal:         General: No swelling, tenderness, deformity or signs of injury. Normal range of motion. Cervical back: Normal range of motion and neck supple. Lymphadenopathy:      Cervical: No cervical adenopathy. Skin:     General: Skin is warm and dry. Neurological:      General: No focal deficit present. Mental Status: She is alert. Diagnostic Study Results   Labs -  Recent Results (from the past 12 hour(s))   CBC WITH AUTOMATED DIFF    Collection Time: 06/25/21 12:53 PM   Result Value Ref Range    WBC 7.7 4.6 - 13.2 K/uL    RBC 4.47 4.20 - 5.30 M/uL    HGB 12.8 12.0 - 16.0 g/dL    HCT 37.5 35.0 - 45.0 %    MCV 83.9 74.0 - 97.0 FL    MCH 28.6 24.0 - 34.0 PG    MCHC 34.1 31.0 - 37.0 g/dL    RDW 14.0 11.6 - 14.5 %    PLATELET 170 080 - 010 K/uL    MPV 10.2 9.2 - 11.8 FL    NEUTROPHILS 73 40 - 73 %    LYMPHOCYTES 18 (L) 21 - 52 %    MONOCYTES 8 3 - 10 %    EOSINOPHILS 1 0 - 5 %    BASOPHILS 0 0 - 2 %    ABS. NEUTROPHILS 5.6 1.8 - 8.0 K/UL    ABS. LYMPHOCYTES 1.4 0.9 - 3.6 K/UL    ABS. MONOCYTES 0.7 0.05 - 1.2 K/UL    ABS. EOSINOPHILS 0.0 0.0 - 0.4 K/UL    ABS.  BASOPHILS 0.0 0.0 - 0.1 K/UL    DF AUTOMATED     METABOLIC PANEL, COMPREHENSIVE    Collection Time: 06/25/21 12:53 PM   Result Value Ref Range    Sodium 137 136 - 145 mmol/L    Potassium 3.7 3.5 - 5.5 mmol/L Chloride 106 100 - 111 mmol/L    CO2 22 21 - 32 mmol/L    Anion gap 9 3.0 - 18 mmol/L    Glucose 124 (H) 74 - 99 mg/dL    BUN 45 (H) 7.0 - 18 MG/DL    Creatinine 2.11 (H) 0.6 - 1.3 MG/DL    BUN/Creatinine ratio 21 (H) 12 - 20      GFR est AA 27 (L) >60 ml/min/1.73m2    GFR est non-AA 22 (L) >60 ml/min/1.73m2    Calcium 8.8 8.5 - 10.1 MG/DL    Bilirubin, total 0.6 0.2 - 1.0 MG/DL    ALT (SGPT) 21 13 - 56 U/L    AST (SGOT) 20 10 - 38 U/L    Alk. phosphatase 77 45 - 117 U/L    Protein, total 7.7 6.4 - 8.2 g/dL    Albumin 3.2 (L) 3.4 - 5.0 g/dL    Globulin 4.5 (H) 2.0 - 4.0 g/dL    A-G Ratio 0.7 (L) 0.8 - 1.7     URINALYSIS W/ RFLX MICROSCOPIC    Collection Time: 06/25/21 12:53 PM   Result Value Ref Range    Color YELLOW      Appearance TURBID      Specific gravity 1.027 1.005 - 1.030      pH (UA) 5.0 5.0 - 8.0      Protein 300 (A) NEG mg/dL    Glucose 100 (A) NEG mg/dL    Ketone TRACE (A) NEG mg/dL    Bilirubin Negative NEG      Blood Negative NEG      Urobilinogen 1.0 0.2 - 1.0 EU/dL    Nitrites Negative NEG      Leukocyte Esterase Negative NEG     URINE MICROSCOPIC ONLY    Collection Time: 06/25/21 12:53 PM   Result Value Ref Range    WBC 0 to 3 0 - 4 /hpf    Bacteria 2+ (A) NEG /hpf       Radiologic Studies -   CT HEAD WO CONT   Final Result                  1.  No acute intracranial abnormalities. 2.  Moderate chronic microvascular ischemic changes. 3.  No significant interval change compared to 10/28/14      XR KNEE LT MIN 4 V   Final Result   Findings/impression:      Small effusion, decreased since prior exam.      Decreased bone mineral density which limits evaluation. No definite acute   osseous findings. Severe tricompartment osteoarthritis with bone-on-bone appearance of the medial   compartment similar to prior exam. Chondrocalcinosis is again noted which can be   associated with CPPD arthropathy. Stable atherosclerosis. XR KNEE LT MIN 4 V    Result Date: 6/25/2021  History: Pain. COMPARISON: 2/1/2021. TECHNIQUE: 4 views left knee. Findings/impression: Small effusion, decreased since prior exam. Decreased bone mineral density which limits evaluation. No definite acute osseous findings. Severe tricompartment osteoarthritis with bone-on-bone appearance of the medial compartment similar to prior exam. Chondrocalcinosis is again noted which can be associated with CPPD arthropathy. Stable atherosclerosis. CT HEAD WO CONT    Result Date: 6/25/2021  EXAM:  CT Head without Contrast           CLINICAL INDICATION: - Trauma. Fall. Struck occiput. Andre Quiroz approximately four times since last Wednesday. ? She states most recent fall was last night. ?She states striking her head onto the wall during fall. COMPARISON:  10/28/14. TECHNIQUE:  - Helical volumetric CT imaging of the head is performed from the base of the skull to the vertex without IV contrast administration. Axial, coronal and sagittal images are generated from the volumetric data set. - Dose optimization techniques are utilized as appropriate to the performed exam with combination of automated exposure control, adjustment of mA and/or kV according to patient size, and use of iterative reconstructive technique. FINDINGS: Brain: - Hemorrhage/ hematoma:  No acute intracranial hemorrhage/ hematoma. - Mass, mass effect:  None. - Gray-white matter differentiation:  Moderate white matter hypodensities, suggestive of chronic microvascular ischemia. - Interval assessment:  No significant interval change. CSF Spaces:  No evidence of abnormal effacement or enlargement. Calvarium:  Intact. Sinuses, Mastoids:  Well-aerated. 1.  No acute intracranial abnormalities. 2.  Moderate chronic microvascular ischemic changes.  3.  No significant interval change compared to 10/28/14      Medications ordered:   Medications   oxyCODONE-acetaminophen (PERCOCET) 5-325 mg per tablet 1 Tablet (1 Tablet Oral Given 6/25/21 46)         Medical Decision Making   Initial Medical Decision Making and DDx:  Traumatic injury will get CT of the head and left knee. For increasing weakness check labs, consider YASMANY uremia hypokalemia hypoglycemia anemia    ED Course: Progress Notes, Reevaluation, and Consults:     2:25 PM Pt reevaluated at this time. Discussed results and findings, as well as, diagnosis and plan for discharge. Follow up with doctors/services listed. Return to the emergency department for alarming symptoms. Pt verbalizes understanding and agreement with plan. All questions addressed. No indication with renal failure anemia UTI intracranial injury injury. Prescription for small amount of Percocet sent to her pharmacy, lab suggestive of some mild dehydration social concentrate on this over the next few days she is happy with the plan for discharge    I am the first provider for this patient. I reviewed the vital signs, available nursing notes, past medical history, past surgical history, family history and social history. Patient Vitals for the past 12 hrs:   Temp Pulse Resp BP SpO2   06/25/21 1138 98.4 °F (36.9 °C) (!) 109 16 (!) 147/83 99 %       Vital Signs-Reviewed the patient's vital signs. Pulse Oximetry Analysis, Cardiac Monitor, 12 lead ekg:       Interpreted by the EP. Records Reviewed: Nursing notes reviewed (Time of Review: 12:20 PM)    Procedures:   Critical Care Time:   Aspirin: (was aspirin given for stroke?)    Diagnosis     Clinical Impression:   1. Closed head injury, initial encounter    2.  Acute pain of left knee        Disposition: Discharged      Follow-up Information     Follow up With Specialties Details Why Contact Ollie Miles MD Family Medicine In 3 days  [place] [address] [phone]             Patient's Medications   Start Taking    OXYCODONE-ACETAMINOPHEN (PERCOCET) 5-325 MG PER TABLET    Take 1 Tablet by mouth every four (4) hours as needed for Pain for up to 3 days. Max Daily Amount: 6 Tablets. Continue Taking    ACYCLOVIR (ZOVIRAX) 200 MG CAPSULE    Take  by mouth every four (4) hours (while awake). AMLODIPINE (NORVASC) 10 MG TABLET    Take  by mouth daily. ERGOCALCIFEROL (VITAMIN D2) 50,000 UNIT CAPSULE    Take 50,000 Units by mouth. GABAPENTIN (NEURONTIN) 300 MG CAPSULE    Take 300 mg by mouth three (3) times daily. LISINOPRIL (PRINIVIL, ZESTRIL) 10 MG TABLET    Take  by mouth daily. LOSARTAN (COZAAR) 100 MG TABLET    Take 100 mg by mouth daily. METOPROLOL (LOPRESSOR) 100 MG TABLET    Take  by mouth two (2) times a day. SOLIFENACIN (VESICARE) 10 MG TABLET    Take 1 Tab by mouth daily. TRAMADOL (ULTRAM) 50 MG TABLET    Take 50 mg by mouth every six (6) hours as needed for Pain.    These Medications have changed    No medications on file   Stop Taking    No medications on file     _______________________________    Notes:    Bren Castañeda MD using Dragon dictation      _______________________________

## 2021-06-25 NOTE — ED TRIAGE NOTES
Patient states that she has fallen approximately four times since last Wednesday. She states most recent fall was last night. She states striking her head onto the wall during fall. Denies LOC. She states pain to left knee and advises that left knee is \"giving out\" and causing her to fall. She states pain to both knees at present time.

## 2021-06-29 NOTE — DISCHARGE INSTRUCTIONS
Take medication as prescribed. Follow-up with your primary care physician within 2 days for reassessment. Follow-up with the GI physician within a week for reassessment and for a colonoscopy. Follow-up with the nephrologist (kidney doctor) for the kidney cysts and elevated kidney function. Bring the results from this visit with you for their review. Return to the ED immediately for any new, worsening, or persistent symptoms, including fever, vomiting, or any other medical concerns.

## 2021-06-29 NOTE — ED PROVIDER NOTES
EMERGENCY DEPARTMENT HISTORY AND PHYSICAL EXAM    11:03 AM      Date: 6/29/2021  Patient Name: Yvette Mera    History of Presenting Illness     Chief Complaint   Patient presents with    Constipation         History Provided By: Patient , Son     Additional History (Context): Yvette Mera is a 80 y.o. female with hx of DM, HTN, HLD, and other noted PMH who presents with complaint of rectal pain and constipation x4 days. Patient notes she has tried a stool softener without relief. Patient also notes she has been trying to disimpact herself and can feel hard stool. Patient denies fever or chills, chest pain, shortness of breath, nausea or vomiting, diarrhea, dysuria, hematuria, melena, BRBPR, or any other concerns at this time. PCP: Jane Diop MD    Current Outpatient Medications   Medication Sig Dispense Refill    docusate sodium (Colace) 100 mg capsule Take 1 Capsule by mouth two (2) times a day for 30 days. 60 Capsule 0    polyethylene glycol (Miralax) 17 gram/dose powder Take 17 g by mouth daily. 1 tablespoon with 8 oz of water daily 117 g 0    solifenacin (VESICARE) 10 mg tablet Take 1 Tab by mouth daily. 90 Tab 3    traMADol (ULTRAM) 50 mg tablet Take 50 mg by mouth every six (6) hours as needed for Pain.  amLODIPine (NORVASC) 10 mg tablet Take  by mouth daily.  lisinopril (PRINIVIL, ZESTRIL) 10 mg tablet Take  by mouth daily.  ergocalciferol (VITAMIN D2) 50,000 unit capsule Take 50,000 Units by mouth.  acyclovir (ZOVIRAX) 200 mg capsule Take  by mouth every four (4) hours (while awake).  metoprolol (LOPRESSOR) 100 mg tablet Take  by mouth two (2) times a day.  gabapentin (NEURONTIN) 300 mg capsule Take 300 mg by mouth three (3) times daily.  losartan (COZAAR) 100 mg tablet Take 100 mg by mouth daily.          Past History     Past Medical History:  Past Medical History:   Diagnosis Date    Anxiety     Arthritis     Diabetes ([facility] [address]     Edema of extremities     Hematologic disorder     Hepatitis C carrier ([facility] [address]     Hypercholesterolemia     Hypertension     Insomnia     Labyrinthitis     Menopause     Primary osteoarthritis of knees, bilateral     Urge incontinence     Vitamin D deficiency        Past Surgical History:  Past Surgical History:   Procedure Laterality Date    HX HYSTERECTOMY  36    HX OTHER SURGICAL  2007    Lap Band for weight loss       Family History:  Family History   Problem Relation Age of Onset    Breast Cancer Sister 80    Ovarian Cancer Sister 33        33s       Social History:  Social History     Tobacco Use    Smoking status: Never Smoker    Smokeless tobacco: Never Used   Substance Use Topics    Alcohol use: No    Drug use: No       Allergies: Allergies   Allergen Reactions    Contrast Dye [Iodine] Other (comments)     Neurological reaction         Review of Systems       Review of Systems   Constitutional: Negative for chills and fever. Respiratory: Negative for shortness of breath. Cardiovascular: Negative for chest pain. Gastrointestinal: Positive for constipation and rectal pain. Negative for abdominal pain, nausea and vomiting. Skin: Negative for rash. Neurological: Negative for weakness. All other systems reviewed and are negative. Physical Exam     Visit Vitals  /76 (BP 1 Location: Left upper arm, BP Patient Position: Sitting)   Pulse 96   Temp 98.2 °F (36.8 °C)   Resp 16   Ht 5' 4\" (1.626 m)   Wt 85.7 kg (189 lb)   SpO2 98%   BMI 32.44 kg/m²         Physical Exam  Vitals and nursing note reviewed. Exam conducted with a chaperone present (Nurse Matthew Hogan). Constitutional:       General: She is not in acute distress. Appearance: Normal appearance. She is well-developed. She is not ill-appearing, toxic-appearing or diaphoretic. HENT:      Head: Normocephalic and atraumatic. Cardiovascular:      Rate and Rhythm: Normal rate and regular rhythm.       Heart sounds: Normal heart sounds. No murmur heard. No friction rub. No gallop. Pulmonary:      Effort: Pulmonary effort is normal. No respiratory distress. Breath sounds: Normal breath sounds. No wheezing or rales. Abdominal:      General: Abdomen is flat. There is no distension. Palpations: Abdomen is soft. Tenderness: There is no abdominal tenderness. There is no right CVA tenderness, left CVA tenderness, guarding or rebound. Genitourinary:     Rectum: External hemorrhoid (small, non-thrombosed ) present. No tenderness or anal fissure. Normal anal tone. Musculoskeletal:         General: Normal range of motion. Cervical back: Normal range of motion and neck supple. Skin:     General: Skin is warm. Findings: No rash. Neurological:      Mental Status: She is alert. Diagnostic Study Results     Labs -  Recent Results (from the past 12 hour(s))   CBC WITH AUTOMATED DIFF    Collection Time: 06/29/21 11:28 AM   Result Value Ref Range    WBC 8.4 4.6 - 13.2 K/uL    RBC 4.31 4.20 - 5.30 M/uL    HGB 12.3 12.0 - 16.0 g/dL    HCT 37.3 35.0 - 45.0 %    MCV 86.5 74.0 - 97.0 FL    MCH 28.5 24.0 - 34.0 PG    MCHC 33.0 31.0 - 37.0 g/dL    RDW 14.5 11.6 - 14.5 %    PLATELET 854 297 - 525 K/uL    MPV 9.8 9.2 - 11.8 FL    NEUTROPHILS 76 (H) 40 - 73 %    LYMPHOCYTES 13 (L) 21 - 52 %    MONOCYTES 8 3 - 10 %    EOSINOPHILS 2 0 - 5 %    BASOPHILS 1 0 - 2 %    ABS. NEUTROPHILS 6.4 1.8 - 8.0 K/UL    ABS. LYMPHOCYTES 1.1 0.9 - 3.6 K/UL    ABS. MONOCYTES 0.7 0.05 - 1.2 K/UL    ABS. EOSINOPHILS 0.1 0.0 - 0.4 K/UL    ABS.  BASOPHILS 0.0 0.0 - 0.1 K/UL    DF AUTOMATED     METABOLIC PANEL, COMPREHENSIVE    Collection Time: 06/29/21 11:28 AM   Result Value Ref Range    Sodium 139 136 - 145 mmol/L    Potassium 4.3 3.5 - 5.5 mmol/L    Chloride 109 100 - 111 mmol/L    CO2 22 21 - 32 mmol/L    Anion gap 8 3.0 - 18 mmol/L    Glucose 185 (H) 74 - 99 mg/dL    BUN 35 (H) 7.0 - 18 MG/DL    Creatinine 2.32 (H) 0.6 - 1.3 MG/DL BUN/Creatinine ratio 15 12 - 20      GFR est AA 24 (L) >60 ml/min/1.73m2    GFR est non-AA 20 (L) >60 ml/min/1.73m2    Calcium 8.2 (L) 8.5 - 10.1 MG/DL    Bilirubin, total 0.3 0.2 - 1.0 MG/DL    ALT (SGPT) 21 13 - 56 U/L    AST (SGOT) 18 10 - 38 U/L    Alk. phosphatase 86 45 - 117 U/L    Protein, total 7.1 6.4 - 8.2 g/dL    Albumin 2.8 (L) 3.4 - 5.0 g/dL    Globulin 4.3 (H) 2.0 - 4.0 g/dL    A-G Ratio 0.7 (L) 0.8 - 1.7         Radiologic Studies -   CT ABD PELV WO CONT   Final Result      1. Moderate stool burden in the colon, especially in the rectum. Circumferential   rectal wall thickening, more pronounced distally, although poorly delineated on   this noncontrast CT. Mild perirectal fatty stranding. Potential obstruction or   mass needs to be excluded. 2. Gastric band noted. Moderate hiatal hernia with air-fluid lumen. 3. Cholelithiasis. 4. Multiple tiny right renal calculi. Fluid-filled cystic lesion in the midpole   of left kidney next to the renal sinus, poorly seen in detail. Recommend renal   ultrasound for better evaluation. Thank you for your referral.       XR ABD (KUB)   Final Result      Small to moderate colonic fecal load. Nonspecific gaseous distention of stomach. Please see report for additional details. Medical Decision Making   I am the first provider for this patient. I reviewed the vital signs, available nursing notes, past medical history, past surgical history, family history and social history. Vital Signs-Reviewed the patient's vital signs. Records Reviewed: Nursing Notes and Old Medical Records (Time of Review: 11:03 AM)    ED Course: Progress Notes, Reevaluation, and Consults:  2:00 PM: Enema attempted without significant relief. Manual disimpaction performed with moderate brown stool, no melena or bright red blood. Will repeat enema and reassess.     3:15 PM: Pt had a large bowl movement on bedside commode, notes she is feeling much better. Reviewed results with patient. Printed CT report for her records. Discussed need for close outpatient follow-up with PMD, GI physician for perirectal stranding/rectal wall thickening and need for colonoscopy and further assessment, nephrologist for elevated creatinine and cystic lesions on the kidneys. Pt verbalized understanding importance of follow-up. Discussed strict return precautions, including fever, vomiting, or any other medical concerns. Provider Notes (Medical Decision Making): 80-year-old female who presents to the ED due to rectal pain and constipation. Afebrile, nontoxic-appearing, looks well. Abdomen soft and nontender to palpation. Labs demonstrate elevated creatinine, IVF administered in the ED. CT abd/pelvis demonstrates moderate stool burden without evidence of obstruction. Soapsuds enema and disimpaction performed with significant relief of symptoms. Patient is stable for discharge with close outpatient follow-up with PMD, GI physician, and nephrologist for further assessment. Strict return precautions provided. Diagnosis     Clinical Impression:   1. Constipation, unspecified constipation type    2. Elevated serum creatinine    3.  Kidney cysts        Disposition: home     Follow-up Information     Follow up With Specialties Details [facility] EMERGENCY DEPT Emergency Medicine  If symptoms worsen [address] [place] [address]    Elena Saenz MD Family Medicine Schedule an appointment as soon as possible for a visit   [facility] [address] [facility] 264      Jose M Huerta MD Gastroenterology Schedule an appointment as soon as possible for a visit   [address] [facility] 200  [address] [address]      Otoniel Boudreaux MD Nephrology Schedule an appointment as soon as possible for a visit  for kidney cysts and elevated kidney function [address] [place] [address] [facility]  [phone]             Patient's Medications   Start Taking    DOCUSATE SODIUM (COLACE) 100 MG CAPSULE    Take 1 Capsule by mouth two (2) times a day for 30 days. POLYETHYLENE GLYCOL (MIRALAX) 17 GRAM/DOSE POWDER    Take 17 g by mouth daily. 1 tablespoon with 8 oz of water daily   Continue Taking    ACYCLOVIR (ZOVIRAX) 200 MG CAPSULE    Take  by mouth every four (4) hours (while awake). AMLODIPINE (NORVASC) 10 MG TABLET    Take  by mouth daily. ERGOCALCIFEROL (VITAMIN D2) 50,000 UNIT CAPSULE    Take 50,000 Units by mouth. GABAPENTIN (NEURONTIN) 300 MG CAPSULE    Take 300 mg by mouth three (3) times daily. LISINOPRIL (PRINIVIL, ZESTRIL) 10 MG TABLET    Take  by mouth daily. LOSARTAN (COZAAR) 100 MG TABLET    Take 100 mg by mouth daily. METOPROLOL (LOPRESSOR) 100 MG TABLET    Take  by mouth two (2) times a day. SOLIFENACIN (VESICARE) 10 MG TABLET    Take 1 Tab by mouth daily. TRAMADOL (ULTRAM) 50 MG TABLET    Take 50 mg by mouth every six (6) hours as needed for Pain. These Medications have changed    No medications on file   Stop Taking    No medications on file       Dictation disclaimer:  Please note that this dictation was completed with [employer], the computer voice recognition software. Quite often unanticipated grammatical, syntax, homophones, and other interpretive errors are inadvertently transcribed by the computer software. Please disregard these errors. Please excuse any errors that have escaped final proofreading.

## 2021-06-29 NOTE — ED NOTES
I have reviewed discharge instructions with the patient. The patient verbalized understanding. Patient discharged without removing armband. Informed of privacy risks if armband lost or stolen. Current Discharge Medication List      START taking these medications    Details   docusate sodium (Colace) 100 mg capsule Take 1 Capsule by mouth two (2) times a day for 30 days. Qty: 60 Capsule, Refills: 0  Start date: 6/29/2021, End date: 7/29/2021      polyethylene glycol (Miralax) 17 gram/dose powder Take 17 g by mouth daily.  1 tablespoon with 8 oz of water daily  Qty: 117 g, Refills: 0  Start date: 6/29/2021

## 2021-06-29 NOTE — ED NOTES
Soap suds enema completed, pt tolerated well. Sitting on bed side commode attempting to have bowel movement.

## 2021-06-29 NOTE — ED TRIAGE NOTES
Patient states constipation x 4 days. She states taking something from the pharmacy for constipation. Per EMS, patient has attempted to disimpact herself and currently has stool on her person. Patient c/o rectal pain. Patient states experiencing a fall on Wednesday, but advises that she has been medically evaluated related to fall.

## 2021-07-03 PROBLEM — I10 HTN (HYPERTENSION), MALIGNANT: Status: ACTIVE | Noted: 2021-01-01

## 2021-07-03 NOTE — ED PROVIDER NOTES
EMERGENCY DEPARTMENT HISTORY AND PHYSICAL EXAM    Date: 7/3/2021  Patient Name: Hailey Rivas    History of Presenting Illness     Chief Complaint   Patient presents with    Generalized Body Aches         History Provided By: Patient      Additional History (Context): Hailey Rivas is a 80 y.o. female with diabetes, hypertension, hyperlipidemia, obesity, osteoarthritis and Insomnia, hepatitis who presents with feeling short winded short of breath with exertion. This is been going on for the past 2 to 3 weeks. She is also feeling very restless at night more so than usual.  Denies any dysuria or urinary frequency. Denies any change in the swelling of her legs. Denies chest pain per se dizziness numbness weakness headaches nausea vomiting diarrhea abdominal pain fever cough. Denies melena or hematochezia. Pt has been falling and lives alone. Of note: this is pt's third visit in one week. PCP: Nicole Bee MD    Current Outpatient Medications   Medication Sig Dispense Refill    docusate sodium (Colace) 100 mg capsule Take 1 Capsule by mouth two (2) times a day for 30 days. 60 Capsule 0    polyethylene glycol (Miralax) 17 gram/dose powder Take 17 g by mouth daily. 1 tablespoon with 8 oz of water daily 117 g 0    solifenacin (VESICARE) 10 mg tablet Take 1 Tab by mouth daily. 90 Tab 3    traMADol (ULTRAM) 50 mg tablet Take 50 mg by mouth every six (6) hours as needed for Pain.  amLODIPine (NORVASC) 10 mg tablet Take  by mouth daily.  lisinopril (PRINIVIL, ZESTRIL) 10 mg tablet Take  by mouth daily.  ergocalciferol (VITAMIN D2) 50,000 unit capsule Take 50,000 Units by mouth.  acyclovir (ZOVIRAX) 200 mg capsule Take  by mouth every four (4) hours (while awake).  metoprolol (LOPRESSOR) 100 mg tablet Take  by mouth two (2) times a day.  gabapentin (NEURONTIN) 300 mg capsule Take 300 mg by mouth three (3) times daily.       losartan (COZAAR) 100 mg tablet Take 100 mg by mouth daily. Past History     Past Medical History:  Past Medical History:   Diagnosis Date    Anxiety     Arthritis     Diabetes ([facility] [address]     Edema of extremities     Hematologic disorder     Hepatitis C carrier ([facility] [address]     Hypercholesterolemia     Hypertension     Insomnia     Labyrinthitis     Menopause     Primary osteoarthritis of knees, bilateral     Urge incontinence     Vitamin D deficiency        Past Surgical History:  Past Surgical History:   Procedure Laterality Date    HX HYSTERECTOMY  36    HX OTHER SURGICAL  2007    Lap Band for weight loss       Family History:  Family History   Problem Relation Age of Onset    Breast Cancer Sister 80    Ovarian Cancer Sister 33        33s       Social History:  Social History     Tobacco Use    Smoking status: Never Smoker    Smokeless tobacco: Never Used   Substance Use Topics    Alcohol use: No    Drug use: No       Allergies: Allergies   Allergen Reactions    Contrast Dye [Iodine] Other (comments)     Neurological reaction         Review of Systems   Review of Systems   Constitutional: Positive for fatigue. Negative for fever. HENT: Negative. Eyes: Negative. Respiratory: Positive for shortness of breath. Cardiovascular: Negative for chest pain. Gastrointestinal: Negative for abdominal pain, blood in stool, diarrhea, nausea and vomiting. Endocrine: Negative. Genitourinary: Negative. Negative for dysuria and frequency. Musculoskeletal: Negative for gait problem. Skin: Negative. Allergic/Immunologic: Negative. Neurological: Negative for dizziness, weakness, light-headedness, numbness and headaches. Hematological: Negative. Psychiatric/Behavioral: Negative.       All Other Systems Negative  Physical Exam     Vitals:    07/03/21 1016 07/03/21 1024   BP: (!) 238/122    Pulse: 82    Resp: 18    Temp: 97 °F (36.1 °C)    SpO2: 100% 100%   Weight: 85.7 kg (189 lb)    Height: 5' 4\" (1.626 m)      Physical Exam  Vitals and nursing note reviewed. Constitutional:       General: She is not in acute distress. Appearance: She is well-developed. She is obese. HENT:      Head: Normocephalic and atraumatic. Eyes:      Pupils: Pupils are equal, round, and reactive to light. Neck:      Thyroid: No thyromegaly. Vascular: No JVD. Trachea: No tracheal deviation. Cardiovascular:      Rate and Rhythm: Normal rate and regular rhythm. Heart sounds: Normal heart sounds. No murmur heard. No friction rub. No gallop. Pulmonary:      Effort: Pulmonary effort is normal. No respiratory distress. Breath sounds: Normal breath sounds. No stridor. No wheezing or rales. Chest:      Chest wall: No tenderness. Abdominal:      General: There is no distension. Palpations: Abdomen is soft. There is no mass. Tenderness: There is no abdominal tenderness. There is no guarding or rebound. Musculoskeletal:         General: No tenderness. Lymphadenopathy:      Cervical: No cervical adenopathy. Skin:     General: Skin is warm and dry. Coloration: Skin is not pale. Findings: No erythema or rash. Neurological:      Mental Status: She is alert and oriented to person, place, and time. Psychiatric:         Behavior: Behavior normal.         Thought Content: Thought content normal.            Diagnostic Study Results     Labs -   No results found for this or any previous visit (from the past 12 hour(s)). Radiologic Studies -   XR CHEST PORT    (Results Pending)     CT Results  (Last 48 hours)    None        CXR Results  (Last 48 hours)    None            Medical Decision Making   I am the first provider for this patient. I reviewed the vital signs, available nursing notes, past medical history, past surgical history, family history and social history. Vital Signs-Reviewed the patient's vital signs.   Procedures:  Procedures    Provider Notes (Medical Decision Making): Pt has been falling and is extremely hypertensive. She is symptomatic for SOB w/exertion in her home and lives alone. Admitting to hospitalist service; starting nitro drip. MED RECONCILIATION:  No current facility-administered medications for this encounter. Current Outpatient Medications   Medication Sig    docusate sodium (Colace) 100 mg capsule Take 1 Capsule by mouth two (2) times a day for 30 days.  polyethylene glycol (Miralax) 17 gram/dose powder Take 17 g by mouth daily. 1 tablespoon with 8 oz of water daily    solifenacin (VESICARE) 10 mg tablet Take 1 Tab by mouth daily.  traMADol (ULTRAM) 50 mg tablet Take 50 mg by mouth every six (6) hours as needed for Pain.  amLODIPine (NORVASC) 10 mg tablet Take  by mouth daily.  lisinopril (PRINIVIL, ZESTRIL) 10 mg tablet Take  by mouth daily.  ergocalciferol (VITAMIN D2) 50,000 unit capsule Take 50,000 Units by mouth.  acyclovir (ZOVIRAX) 200 mg capsule Take  by mouth every four (4) hours (while awake).  metoprolol (LOPRESSOR) 100 mg tablet Take  by mouth two (2) times a day.  gabapentin (NEURONTIN) 300 mg capsule Take 300 mg by mouth three (3) times daily.  losartan (COZAAR) 100 mg tablet Take 100 mg by mouth daily. Disposition:  admit    Follow-up Information    None         Current Discharge Medication List            Core Measures:    Critical Care Time:   Critical Care Time:   I have spent 35 minutes of critical care time involved in lab review, consultations with specialist, family decision-making, and documentation. During this entire length of time I was immediately available to the patient.     Critical Care: The reason for providing this level of medical care for this critically ill patient was due a critical illness that impaired one or more vital organ systems such that there was a high probability of imminent or life threatening deterioration in the patients condition.  This care involved high complexity decision making to assess, manipulate, and support vital system functions, to treat this degreee vital organ system failure and to prevent further life threatening deterioration of the patients condition. Diagnosis     Clinical Impression: No diagnosis found.

## 2021-07-03 NOTE — H&P
Date of Admission: 7/3/2021      Assessment:   Hypertensive urgency: BP upon presentation 238/122; started on nitro drip  Acute on chronic CHF: BNP 2329  YASMANY: improved from 6/29 ED visit  Chronic LE edema  Hyperglycemia without prior dx of DM   Constipation: recent ED visit for abdominal pain and constipation, CT with moderate stool in colon wth rectal wall thickening and perirectal fat stranding  Abnormal UA:  300 protein, glucose 100,   Vit D deficiency    Plan:   Cardiology consult   TTE  Lasix IV today- may need to continue tomorrow depending upon symptoms  Strict I/O's, daily weights  Supplemental O2 to maintain sats >92%  Continue nitro drip for HTN- gradually wean off. CBC, CMP in am  SSI, accuchecks, HBA1c  PT and OT evals  Nephrology consult in am   Renal US  Continue Norvasc, metoprolol, Cozaar  Avoid nephrotoxic agents, renally dose medications  Miralax + colace for recent constipation      Allen Riggs D.O. Internal Medicine and Infectious Diseases      Subjective:    Patient is a 80 y. o.female who is being evaluated for SOB    Ms. Brenda Wang is an 79 yo lady with hx of DM type 2, HTN, Vit D deficiency, chronic LE edema who is presenting to the ED with complaints of worsening SOB over the last 2-3 weeks. She states that she has been getting progressively weak and has had several falls at home in the recent past. She notes that she has been getting increased SOB with any exertion. No cough, CP, fevers or chills. No n/v/d. She denies weight increase or increase to her LE edema.     Past Medical History:   Diagnosis Date    Anxiety     Arthritis     [facility] ([facility] [address] [facility]     Edema of extremities     Hematologic disorder     Hepatitis C carrier ([facility] [address] [facility]     Hypercholesterolemia     Hypertension     Insomnia     Labyrinthitis     Menopause     Primary osteoarthritis of knees, bilateral     Urge incontinence     Vitamin D deficiency      Past Surgical History:   Procedure Laterality Date    HX HYSTERECTOMY  1980    HX OTHER SURGICAL  2007    Lap Band for weight loss     Family History   Problem Relation Age of Onset    Breast Cancer Sister 80    Ovarian Cancer Sister 28        30s     Medications reviewed as below:   Current Facility-Administered Medications   Medication Dose Route Frequency Provider Last Rate Last Admin    furosemide (LASIX) injection 40 mg  40 mg IntraVENous NOW Rossi Bauman PA        amLODIPine (NORVASC) tablet 10 mg  10 mg Oral NOW Rossi Bauman PA        nitroPRUSSide (NIPRIDE) 50 mg in dextrose 5% 250 mL infusion  0.25-10 mcg/kg/min IntraVENous TITRATE Rossi Bauman PA         Allergies   Allergen Reactions    Contrast Dye [Iodine] Other (comments)     Neurological reaction     Social History     Socioeconomic History    Marital status: UNKNOWN     Spouse name: Not on file    Number of children: Not on file    Years of education: Not on file    Highest education level: Not on file   Occupational History    Not on file   Tobacco Use    Smoking status: Never Smoker    Smokeless tobacco: Never Used   Substance and Sexual Activity    Alcohol use: No    Drug use: No    Sexual activity: Not on file   Other Topics Concern    Not on file   Social History Narrative    Not on file     Social Determinants of Health     Financial Resource Strain:     Difficulty of Paying Living Expenses:    Food Insecurity:     Worried About Running Out of Food in the Last Year:     Claribel of Food in the Last Year:    Transportation Needs:     Lack of Transportation (Medical):      Lack of Transportation (Non-Medical):    Physical Activity:     Days of Exercise per Week:     Minutes of Exercise per Session:    Stress:     Feeling of Stress :    Social Connections:     Frequency of Communication with Friends and Family:     Frequency of Social Gatherings with Friends and Family:     Attends Mormon Services:     Active Member of Clubs or Organizations:     Attends Club or Organization Meetings:     Marital Status:    Intimate Partner Violence:     Fear of Current or Ex-Partner:     Emotionally Abused:     Physically Abused:     Sexually Abused:         Review of Systems    Negative Unless BOLDED    General: fevers, chills, myalgias, arthralgias, unexplained weight loss, malaise, fatigue. HEENT:  headaches,sinus pain or presure, recent URI, recent dental procedures;  tinnitus, hearing loss , visual changes, catarats, dizziness or blurred vision  PUlMONARY:  cough , shortness of breath, sputum production, hx of asthma or COPD. previous treatement for TB or PPD. Cardiovascular: chest pain, previous CAD/MI, vavlular heart disease,  murmurs  GI:   nausea, vomiting, diarrhea, abdominal pain, prior C.diff  :  urinary frequency, dysuria, hematuria, bladder incontinence. Neurologic:  seizures, syncope or prior CVA/TIA, confusion, memory impairment, neuropathy  Musculoskeletal:  myalgias arthralgias, joint pain/ swelling,  back pain; recent fall  Skin:  Purities,  recurrent cellulitis,  chronic stasis ulcer, diabetic foot ulcers  Endocrine: polyuria, polydipsia, hair loss, weight gain  Psych: Denies depression or treatment by a psychiatrist/psycologist  Heme-Onc: prior DVT, easy bruising, fatigue, malignancy        Objective:        Visit Vitals  BP (!) 156/86   Pulse 92   Temp 98.3 °F (36.8 °C)   Resp 14   Ht 5' 4\" (1.626 m)   Wt 85.7 kg (189 lb)   SpO2 100%   BMI 32.44 kg/m²     Temp (24hrs), Av.5 °F (36.4 °C), Min:97 °F (36.1 °C), Max:98.3 °F (36.8 °C)        General:   awake alert and oriented   Skin:   no rashes or skin lesions noted on limited exam   HEENT:  Normocephalic, atraumatic, PERRL, EOMI, no scleral icterus or pallor; no conjunctival hemmohage;  nasal and oral mucous are moist and without evidence of lesions. No thrush. Neck supple, no bruits.    Lymph Nodes:   no cervical, axillary or inguinal adenopathy   Lungs:   non-labored, shallow inspirations, diminished at bases   Heart:  RRR, s1 and s2; no murmurs rubs or gallops, 1-2+ edema, + pedal pulses   Abdomen:  soft, non-distended, active bowel sounds, no hepatomegaly, no splenomegaly. Non-tender   Genitourinary:  deferred   Extremities:   no clubbing, cyanosis; no joint effusions or swelling; Full ROM of all large joints to the upper and lower extremities; muscle mass appropriate for age   Neurologic:  No gross focal sensory abnormalities; 5/5 muscle strength to upper and lower extremities. Speech appropirate. Cranial nerves intact   Psychiatric:   anxious, hesitate to engage in therapy       Labs: Results:   Chemistry Recent Labs     07/03/21  1051   *      K 4.1      CO2 22   BUN 23*   CREA 1.62*   CA 8.8   AGAP 7   BUCR 14   AP 93   TP 7.6   ALB 3.2*   GLOB 4.4*   AGRAT 0.7*      CBC w/Diff Recent Labs     07/03/21  1051   WBC 6.7   RBC 4.36   HGB 12.5   HCT 36.7      GRANS 71   LYMPH 20*   EOS 1            Lab Results   Component Value Date/Time    Specimen Description: URINE 10/08/2013 02:42 PM    Specimen Description: URINE 10/08/2013 02:42 PM    Lab Results   Component Value Date/Time    Culture result:  10/08/2013 02:42 PM     [id number] COLONIES/mL MIXED GRAM POSITIVE OPAL, PROBABLE SKIN/GENITAL CONTAMINATION. Culture result:  10/08/2013 02:42 PM     [id number] COLONIES/mL MIXED GRAM POSITIVE OPAL, PROBABLE SKIN/GENITAL CONTAMINATION. Imaging:      All imaging reviewed from Admission to present as per radiology interpretation in [address] [place] [address]

## 2021-07-03 NOTE — ROUTINE PROCESS
TRANSFER - OUT REPORT:    Verbal report given to IVANA BOB on Castillo Portillo  being transferred to CVT STEPDOWN for routine progression of care       Report consisted of patients Situation, Background, Assessment and   Recommendations(SBAR). Information from the following report(s) SBAR, ED Summary and MAR was reviewed with the receiving nurse. Lines:   Peripheral IV 07/03/21 Left Antecubital (Active)   Site Assessment Clean, dry, & intact 07/03/21 1336   Phlebitis Assessment 0 07/03/21 1336   Infiltration Assessment 0 07/03/21 1336   Dressing Status Clean, dry, & intact 07/03/21 1336   Dressing Type Transparent 07/03/21 1336   Hub Color/Line Status Pink 07/03/21 1336        Opportunity for questions and clarification was provided.       Patient transported with:   Registered Nurse

## 2021-07-03 NOTE — Clinical Note
Status[de-identified] INPATIENT [101]   Type of Bed: Stepdown [17]   Cardiac Monitoring Required?: Yes   Inpatient Hospitalization Certified Necessary for the Following Reasons: 3.  Patient receiving treatment that can only be provided in an inpatient setting (further clarification in H&P documentation)   Admitting Diagnosis: HTN (hypertension), malignant [661042]   Admitting Physician: Abraham Dean   Attending Physician: Abraham Dean   Estimated Length of Stay: 2 Midnights   Discharge Plan[de-identified] Home with Office Follow-up

## 2021-07-03 NOTE — ED TRIAGE NOTES
Patient arrived via EMS presenting with generalized body aches for 4 days. Patient has also fallen 6 times, No LOC, but she did hit her head. She states her legs feel weak.

## 2021-07-04 PROBLEM — I16.0 HYPERTENSIVE URGENCY: Status: ACTIVE | Noted: 2021-01-01

## 2021-07-04 NOTE — CONSULTS
Cardiology Consult Note    Consultation request by Mimi Turner MD for advice/opinion related to evaluating shortness of breath    Date of  Admission: 7/3/2021 10:01 AM   Primary Care Physician:  Trevin Matthews MD    Consulting Cardiologist: Dr. Zane Armendariz  Patient seen and examined independently. Patient presented with a severely elevated blood pressure and shortness of breath. Echocardiogram personally reviewed. Ejection fraction 50 to 55%. There was mild concentric LVH. There was no significant valvular pathology. The patient reported a decline in overall function in the last week or so with marked lower extremity weakness. Patient does have evidence for emphysema on the upper cuts of the CT of the abdomen. Agree with assessment and plan as noted below. Deb Cain MD   Assessment:     -HTN urgency, initial /122 on presentation to ER 7/3/2021.  -Elevated BNP without evidence of pulmonary edema on CXR 7/3/2021.  -Echo 7/4/2021: EF 50-55% with global hypokinesis with preserved septal function, normal LV cavity size, mild concentric LVH, normal RV size with low-normal function, no significant valvular disease  -CKD, Cr 1.62 with GFR 36 on presentation 7/3/2021, which is improved from Cr 2.32 and GFR 24 on 6/29/2021.    -Moderate emphysema at lung bases noted as incidental finding on CT abd/pelvis 6/29/2021.   -Recurrent falls  -DM  -Hypercholesterolemia     Plan:     -Echocardiogram today with EF 50-55%. -Will check TSH.   -Will discontinue Nitroprusside infusion, continued on Losartan and Lopressor. Would resume outpatient Amlodipine tomorrow as well if needed for BP control.  -Pending nephrology consultation, assistance appreciated in advance.  -Would consider pulmonology consultation as well given evidence of moderate emphysema on CT abd/pelvis. History of Present Illness: This is a 80 y.o. female admitted for HTN (hypertension), malignant [I10].     Patient complains of: shortness of breath    Theodore Blake is a 80 y.o. female who presented to the hospital due to shortness of breath. Pt reports she has been decreased appetite with generalized weakness and recurrent falls over the last several days. She also reports shortness of breath with exertion, no chest pain. Cardiac risk factors: diabetes mellitus, hypertension, post-menopausal      Review of Symptoms:  Except as stated above include:  Constitutional:  As per HPI  Respiratory:  + shortness of breath  Cardiovascular:  negative  Gastrointestinal: negative  Genitourinary:  negative  Musculoskeletal:  Negative  Neurological:  Negative  Dermatological:  Negative  Endocrinological: Negative  Psychological:  Negative       Past Medical History:     Past Medical History:   Diagnosis Date    Anxiety     Arthritis     Diabetes ([facility] [address]     Edema of extremities     Hematologic disorder     [facility] C carrier ([email or web address]     Hypercholesterolemia     Hypertension     Insomnia     Labyrinthitis     Menopause     Primary osteoarthritis of knees, bilateral     Urge incontinence     Vitamin D deficiency          Social History:     Social History     Socioeconomic History    Marital status: UNKNOWN     Spouse name: Not on file    Number of children: Not on file    Years of education: Not on file    Highest education level: Not on file   Tobacco Use    Smoking status: Never Smoker    Smokeless tobacco: Never Used   Substance and Sexual Activity    Alcohol use: No    Drug use: No     Social Determinants of Health     Financial Resource Strain:     Difficulty of Paying Living Expenses:    Food Insecurity:     Worried About Running Out of Food in the Last Year:     Ran Out of Food in the Last Year:    Transportation Needs:     Lack of Transportation (Medical):      Lack of Transportation (Non-Medical):    Physical Activity:     Days of Exercise per Week:     Minutes of Exercise per Session:    Stress:     Feeling of Stress : Social Connections:     Frequency of Communication with Friends and Family:     Frequency of Social Gatherings with Friends and Family:     Attends Alevism Services:     Active Member of Clubs or Organizations:     Attends Club or Organization Meetings:     Marital Status:         Family History:     Family History   Problem Relation Age of Onset    Breast Cancer Sister 80    Ovarian Cancer Sister 28        30s        Medications:      Allergies   Allergen Reactions    Contrast Dye [Iodine] Other (comments)     Neurological reaction        Current Facility-Administered Medications   Medication Dose Route Frequency    docusate sodium (COLACE) capsule 100 mg  100 mg Oral BID    ergocalciferol capsule 50,000 Units  50,000 Units Oral every Sunday    gabapentin (NEURONTIN) capsule 300 mg  300 mg Oral TID    losartan (COZAAR) tablet 100 mg  100 mg Oral DAILY    metoprolol tartrate (LOPRESSOR) tablet 100 mg  100 mg Oral BID    polyethylene glycol (MIRALAX) packet 17 g  17 g Oral DAILY    trospium (SANCTURA) tablet 20 mg  20 mg Oral DAILY    furosemide (LASIX) injection 40 mg  40 mg IntraVENous DAILY    sodium chloride (NS) flush 5-40 mL  5-40 mL IntraVENous Q8H    sodium chloride (NS) flush 5-40 mL  5-40 mL IntraVENous PRN    acetaminophen (TYLENOL) tablet 650 mg  650 mg Oral Q6H PRN    Or    acetaminophen (TYLENOL) suppository 650 mg  650 mg Rectal Q6H PRN    polyethylene glycol (MIRALAX) packet 17 g  17 g Oral DAILY PRN    ondansetron (ZOFRAN ODT) tablet 4 mg  4 mg Oral Q8H PRN    Or    ondansetron (ZOFRAN) injection 4 mg  4 mg IntraVENous Q6H PRN    enoxaparin (LOVENOX) injection 30 mg  30 mg SubCUTAneous DAILY    insulin lispro (HUMALOG) injection   SubCUTAneous AC&HS    glucose chewable tablet 16 g  4 Tablet Oral PRN    glucagon (GLUCAGEN) injection 1 mg  1 mg IntraMUSCular PRN    dextrose (D50W) injection syrg 12.5-25 g  25-50 mL IntraVENous PRN    nitroPRUSSide (NIPRIDE) 50 mg in dextrose 5% 250 mL infusion  0.25-10 mcg/kg/min IntraVENous TITRATE         Physical Exam:     Visit Vitals  /76 (BP 1 Location: Left upper arm, BP Patient Position: At rest)   Pulse 96   Temp 97.6 °F (36.4 °C)   Resp 26   Ht 5' 4\" (1.626 m)   Wt 84.1 kg (185 lb 8 oz)   SpO2 100%   BMI 31.84 kg/m²       TELE: normal sinus rhythm    BP Readings from Last 3 Encounters:   07/04/21 126/76   06/29/21 128/76   06/25/21 (!) 147/83     Pulse Readings from Last 3 Encounters:   07/04/21 96   06/29/21 96   06/25/21 (!) 109     Wt Readings from Last 3 Encounters:   07/04/21 84.1 kg (185 lb 8 oz)   06/29/21 85.7 kg (189 lb)   06/25/21 85.7 kg (189 lb)       General:  alert, cooperative, no distress, appears stated age  Neck:  supple  Lungs:  clear to auscultation bilaterally  Heart:  regular rate and rhythm  Abdomen:  abdomen is soft without significant tenderness, masses, organomegaly or guarding  Extremities:  atraumatic, no significant pitting edema  Skin: Warm and dry.    Neuro: alert, oriented x3, affect appropriate, no involuntary movements  Psych: non focal     Data Review:     Recent Labs     07/03/21  1051   WBC 6.7   HGB 12.5   HCT 36.7        Recent Labs     07/03/21  1051      K 4.1      CO2 22   *   BUN 23*   CREA 1.62*   CA 8.8   ALB 3.2*   ALT 21       Results for orders placed or performed during the hospital encounter of 07/03/21   EKG, 12 LEAD, INITIAL   Result Value Ref Range    Ventricular Rate 78 BPM    Atrial Rate 78 BPM    P-R Interval 154 ms    QRS Duration 100 ms    Q-T Interval 392 ms    QTC Calculation (Bezet) 446 ms    Calculated P Axis 61 degrees    Calculated R Axis -24 degrees    Calculated T Axis 67 degrees    Diagnosis       Normal sinus rhythm  Minimal voltage criteria for LVH, may be normal variant  Borderline ECG  When compared with ECG of 28-OCT-2014 12:02,  No significant change was found         All Cardiac Markers in the last 24 hours:    Lab Results Component Value Date/Time    TROIQ 0.02 07/03/2021 10:51 AM       Last Lipid:    Lab Results   Component Value Date/Time    Cholesterol, total 198 11/01/2012 03:03 PM    HDL Cholesterol 73 (H) 11/01/2012 03:03 PM    LDL, calculated 107 (H) 11/01/2012 03:03 PM    Triglyceride 90 11/01/2012 03:03 PM    CHOL/HDL Ratio 2.7 11/01/2012 03:03 PM       Cardiographics:     EKG Results     Procedure 720 Value Units Date/Time    EKG, 12 LEAD, INITIAL [[id number]] Collected: 07/03/21 1118    Order Status: Completed Updated: 07/03/21 1123     Ventricular Rate 78 BPM      Atrial Rate 78 BPM      P-R Interval 154 ms      QRS Duration 100 ms      Q-T Interval 392 ms      QTC Calculation (Bezet) 446 ms      Calculated P Axis 61 degrees      Calculated R Axis -24 degrees      Calculated T Axis 67 degrees      Diagnosis --     Normal sinus rhythm  Minimal voltage criteria for LVH, may be normal variant  Borderline ECG  When compared with ECG of 28-OCT-2014 12:02,  No significant change was found                    XR Results (most recent):  Results from Hospital Encounter encounter on 07/03/21    XR CHEST PORT    Narrative  AP CHEST, PORTABLE    INDICATION: Above. Shortness of breath. Generalized body aches x4 days, multiple  falls. COMPARISON: No prior chest radiographs in PACS. Reference abdominal CT  6/29/2021. .    TECHNIQUE: Portable semi-upright AP chest radiograph is reviewed. FINDINGS:    No evidence of focal pulmonary consolidation or pulmonary edema. No evidence of  pneumothorax. The costophrenic sulci appear sharp. The cardiac silhouette is within normal limits in size. Aortic atherosclerotic  calcification noted best seen at the level of the aortic arch. Impression  No evidence of acute pulmonary disease.         Signed By: Gerry Akbar PA-C     July 4, 2021

## 2021-07-04 NOTE — PROGRESS NOTES
Substitution Information per the P&T Committee approved Therapeutic Interchanges Policy    Nonformulary Medication Formulary Interchange   solifenacin (VESICARE) 10 mg daily  Trospium (SANCTURA) 20 mg daily

## 2021-07-04 NOTE — ROUTINE PROCESS
Bedside and Verbal shift change report given to Nancy Phipps RN (oncoming nurse) by Yoli Griffin RN   (offgoing nurse). Report included the following information SBAR, Kardex, MAR and Recent Results.

## 2021-07-04 NOTE — CONSULTS
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   - HTN Urgency   - HFpEF and diastolic Dysfunction   - DM     Recommendations:   - No previous labs in the system , her Cr from last month was in the 2 range and now in the one range which can indicate that she had an YASMANY that is resolving , will continue to monitor daily labs to establish a baseline   - Quantify proteinuria and check kidney US   - BP is better controlled , can resume home BP medications including ARB  - Low salt diet   - Cardiology following    - Avoid nephrotoxins   - Follow labs       Consult requested by: Harvin Spurling, DO    ADMIT DATE: 7/3/2021  CONSULT DATE: July 4, 2021                 Admission diagnosis: Hypertensive urgency     Reason for Nephrology Consultation:  YASMANY /CKD stage 3     HPI: Castillo Portillo is a 80 y.o. female BLACK/  Who is being admitted with hypertensive urgency , she is a poor historian and she doesn't provide that much information . She has Hx of long standing HTN , unknown control at home , she is on multiple BP medications who came to ED because of worsening SOB for the last few weeks , No cough, CP, fevers or chills. No n/v/d. She denies weight increase or increase to her LE edema , BP on admission was 238 / 122 , improved with Nitroprusside infusion , in regard to her kidney function unknown baseline , Cr from last month was in the 2 range and today 1.5 . UA with proteinuria which is chronic and no hematuria .        Past Medical History:   Diagnosis Date    Anxiety     Arthritis     Diabetes ([facility] [address]     Edema of extremities     Hematologic disorder     Hepatitis C carrier ([facility] [address]     Hypercholesterolemia     Hypertension     Insomnia     Labyrinthitis     Menopause     Primary osteoarthritis of knees, bilateral     Urge incontinence     Vitamin D deficiency       Past Surgical History:   Procedure Laterality Date    HX HYSTERECTOMY  1980  HX OTHER SURGICAL  2007    Lap Band for weight loss       Social History     Socioeconomic History    Marital status: UNKNOWN     Spouse name: Not on file    Number of children: Not on file    Years of education: Not on file    Highest education level: Not on file   Occupational History    Not on file   Tobacco Use    Smoking status: Never Smoker    Smokeless tobacco: Never Used   Substance and Sexual Activity    Alcohol use: No    Drug use: No    Sexual activity: Not on file   Other Topics Concern    Not on file   Social History Narrative    Not on file     Social Determinants of Health     Financial Resource Strain:     Difficulty of Paying Living Expenses:    Food Insecurity:     Worried About Running Out of Food in the Last Year:     [address] in the Last Year:    Transportation Needs:     Lack of Transportation (Medical):  Lack of Transportation (Non-Medical):    Physical Activity:     Days of Exercise per Week:     Minutes of Exercise per Session:    Stress:     Feeling of Stress :    Social Connections:     Frequency of Communication with Friends and Family:     Frequency of Social Gatherings with Friends and Family:     Attends [employer] Services:     Active Member of Clubs or Organizations:     Attends Club or Organization Meetings:     Marital Status:    Intimate Partner Violence:     Fear of Current or Ex-Partner:     Emotionally Abused:     Physically Abused:     Sexually Abused:        Family History   Problem Relation Age of Onset    Breast Cancer Sister 80    Ovarian Cancer Sister 28        30s     Allergies   Allergen Reactions    Contrast Dye [Iodine] Other (comments)     Neurological reaction        Home Medications:     Medications Prior to Admission   Medication Sig    docusate sodium (Colace) 100 mg capsule Take 1 Capsule by mouth two (2) times a day for 30 days.  polyethylene glycol (Miralax) 17 gram/dose powder Take 17 g by mouth daily.  1 tablespoon with 8 oz of water daily    solifenacin (VESICARE) 10 mg tablet Take 1 Tab by mouth daily.  traMADol (ULTRAM) 50 mg tablet Take 50 mg by mouth every six (6) hours as needed for Pain.  amLODIPine (NORVASC) 10 mg tablet Take  by mouth daily.  lisinopril (PRINIVIL, ZESTRIL) 10 mg tablet Take  by mouth daily.  ergocalciferol (VITAMIN D2) 50,000 unit capsule Take 50,000 Units by mouth.  acyclovir (ZOVIRAX) 200 mg capsule Take  by mouth every four (4) hours (while awake).  metoprolol (LOPRESSOR) 100 mg tablet Take  by mouth two (2) times a day.  gabapentin (NEURONTIN) 300 mg capsule Take 300 mg by mouth three (3) times daily.  losartan (COZAAR) 100 mg tablet Take 100 mg by mouth daily.        Current Inpatient Medications:     Current Facility-Administered Medications   Medication Dose Route Frequency    docusate sodium (COLACE) capsule 100 mg  100 mg Oral BID    ergocalciferol capsule 50,000 Units  50,000 Units Oral every Sunday    gabapentin (NEURONTIN) capsule 300 mg  300 mg Oral TID    losartan (COZAAR) tablet 100 mg  100 mg Oral DAILY    metoprolol tartrate (LOPRESSOR) tablet 100 mg  100 mg Oral BID    polyethylene glycol (MIRALAX) packet 17 g  17 g Oral DAILY    trospium (SANCTURA) tablet 20 mg  20 mg Oral DAILY    furosemide (LASIX) injection 40 mg  40 mg IntraVENous DAILY    sodium chloride (NS) flush 5-40 mL  5-40 mL IntraVENous Q8H    sodium chloride (NS) flush 5-40 mL  5-40 mL IntraVENous PRN    acetaminophen (TYLENOL) tablet 650 mg  650 mg Oral Q6H PRN    Or    acetaminophen (TYLENOL) suppository 650 mg  650 mg Rectal Q6H PRN    polyethylene glycol (MIRALAX) packet 17 g  17 g Oral DAILY PRN    ondansetron (ZOFRAN ODT) tablet 4 mg  4 mg Oral Q8H PRN    Or    ondansetron (ZOFRAN) injection 4 mg  4 mg IntraVENous Q6H PRN    enoxaparin (LOVENOX) injection 30 mg  30 mg SubCUTAneous DAILY    insulin lispro (HUMALOG) injection   SubCUTAneous AC&HS    glucose chewable tablet 16 g  4 Tablet Oral PRN    glucagon (GLUCAGEN) injection 1 mg  1 mg IntraMUSCular PRN    dextrose (D50W) injection syrg 12.5-25 g  25-50 mL IntraVENous PRN       Review of Systems:   No fever or chills. No sore throat. No cough or hemoptysis. + shortness of breath , no chest pain. No orthopnea or paroxysmal nocturnal dyspnea. Good appetite. No nausea, vomiting, abdominal pain, melena or hematochezia. No constipation or diarrhea. No dysuria, no gross hematuria of voiding difficulties. No ankle swelling, no joint paints. No muscle aches. No skin changes. No dizziness or lightheadedness. No headaches. Physical Assessment:     Vitals:    07/04/21 0636 07/04/21 0801 07/04/21 0802 07/04/21 1106   BP:  128/70 126/76 99/64   Pulse:  95  82   Resp:  23  22   Temp:  97 °F (36.1 °C)  97.5 °F (36.4 °C)   SpO2:  100%  100%   Weight: 84.1 kg (185 lb 8 oz)  83.9 kg (185 lb)    Height:   5' 4\" (1.626 m)      Last 3 Recorded Weights in this Encounter    07/04/21 0400 07/04/21 0636 07/04/21 0802   Weight: 83.5 kg (184 lb) 84.1 kg (185 lb 8 oz) 83.9 kg (185 lb)     Admission weight: Weight: 85.7 kg (189 lb) (07/03/21 1016)      Intake/Output Summary (Last 24 hours) at 7/4/2021 1410  Last data filed at 7/4/2021 1106  Gross per 24 hour   Intake 602.22 ml   Output 300 ml   Net 302.22 ml       Patient is in no apparent distress. , falls to sleep between questions    HEENT: Head is normocephalic and atraumatic. Pupils are round, equal, reactive to light. Sclerae are anicteric. Oropharynx clear. Neck: no cervical lymphadenopathy or thyromegaly. Lungs: good air entry, clear to auscultation bilaterally. Trachea at the midline. Cardiovascular system: S1, S2, regular rate and rhythm. No murmurs, gallops or rubs. No jvd. Abdomen: soft, non tender, non distended. Positive bowel sounds. Extremities: no clubbing, cyanosis + edema. Integumentary: skin is grossly intact.    Neurologic: lethargic but no acute distress Data Review:    Labs: Results:       Chemistry Recent Labs     07/04/21  1030 07/03/21  1051   * 124*    138   K 4.3 4.1    109   CO2 22 22   BUN 25* 23*   CREA 1.49* 1.62*   CA 8.3* 8.8   AGAP 7 7   BUCR 17 14   AP 83 93   TP 6.3* 7.6   ALB 2.8* 3.2*   GLOB 3.5 4.4*   AGRAT 0.8 0.7*         CBC w/Diff Recent Labs     07/04/21  1030 07/03/21  1051   WBC 7.3 6.7   RBC 4.02* 4.36   HGB 11.4* 12.5   HCT 35.2 36.7    275   GRANS  --  71   LYMPH  --  20*   EOS  --  1         Iron/Ferritin No results for input(s): IRON in the last 72 hours. No lab exists for component: TIBCCALC   PTH/VIT D No results for input(s): PTH in the last 72 hours.     No lab exists for component: Sarahi Johnson MD  [facility]  July 4, 2021

## 2021-07-04 NOTE — PROGRESS NOTES
Problem: Pressure Injury - Risk of  Goal: *Prevention of pressure injury  Description: Document John Scale and appropriate interventions in the flowsheet.   Outcome: Progressing Towards Goal  Note: Pressure Injury Interventions:  Sensory Interventions: Assess changes in LOC    Moisture Interventions: Absorbent underpads    Activity Interventions: PT/OT evaluation    Mobility Interventions: Pressure redistribution bed/mattress (bed type)    Nutrition Interventions: Offer support with meals,snacks and hydration    Friction and Shear Interventions: Lift sheet                Problem: Patient Education: Go to Patient Education Activity  Goal: Patient/Family Education  Outcome: Progressing Towards Goal     Problem: Patient Education: Go to Patient Education Activity  Goal: Patient/Family Education  Outcome: Progressing Towards Goal

## 2021-07-05 NOTE — PROGRESS NOTES
Progress Note         Patient: Ace Mims MRN: [id number]  CSN: [id number]    YOB: 1934  Age: 80 y.o. Sex: female    DOA: 7/3/2021 LOS:  LOS: 2 days                    Subjective:     Ace Mims is a 80 y.o. female with a PMHx of chronic diastolic CHF, chronic BLE edema, HTN, HLD, type II DM and anxiety who is admitted for acute on chronic diastolic CHF and hypertensive urgency. Blood pressure much improved since admission  Seen today in room  Still quite sleepy today but more alert than yesterday  Alert and oriented x3  Complains of significant generalized weakness, especially in BLE  Denies headache, dizziness, vision problems, chest pain, shortness of breath    Reportedly lives alone, does not have family in the area      Objective:     Physical Exam:  Visit Vitals  /70 (BP 1 Location: Left upper arm, BP Patient Position: At rest)   Pulse 69   Temp 97.8 °F (36.6 °C)   Resp 19   Ht 5' 4\" (1.626 m)   Wt 83 kg (182 lb 15.7 oz)   SpO2 100%   BMI 31.41 kg/m²        General:         Alert and oriented x3, no acute distress    HEENT: NC, Atraumatic. Anicteric sclerae. Lungs: CTA Bilaterally. No Wheezing/Rhonchi/Rales. Heart:  Regular  rhythm,  No murmur, No Rubs, No Gallops  Abdomen: Soft, Non distended, Non tender. +Bowel sounds, no HSM  Extremities: No c/c/e  Psych:   Not anxious or agitated. Neurologic:  Alert and oriented x3    Intake and Output:  Current Shift:  07/05 0701 - 07/05 1900  In: 240 [P.O.:240]  Out: -   Last three shifts:  07/03 1901 - 07/05 0700  In: 536.2 [P.O.:400;  I.V.:136.2]  Out: 800 [Urine:800]    Labs: Results:       Chemistry Recent Labs     07/05/21  0533 07/04/21  1030 07/03/21  1051   GLU 93 160* 124*    136 138   K 4.5 4.3 4.1    107 109   CO2 23 22 22   BUN 27* 25* 23*   CREA 1.69* 1.49* 1.62*   CA 8.6 8.3* 8.8   AGAP 7 7 7   BUCR 16 17 14   AP  --  83 93   TP  --  6.3* 7.6   ALB  --  2.8* 3.2*   GLOB  --  3.5 4.4*   AGRAT  --  0.8 0.7*      CBC w/Diff Recent Labs     07/04/21  1030 07/03/21  1051   WBC 7.3 6.7   RBC 4.02* 4.36   HGB 11.4* 12.5   HCT 35.2 36.7    275   GRANS  --  71   LYMPH  --  20*   EOS  --  1      Cardiac Enzymes No results for input(s): CPK, CKND1, RADHA in the last 72 hours. No lab exists for component: CKRMB, TROIP   Coagulation No results for input(s): PTP, INR, APTT, INREXT, INREXT in the last 72 hours. Lipid Panel Lab Results   Component Value Date/Time    Cholesterol, total 198 11/01/2012 03:03 PM    HDL Cholesterol 73 (H) 11/01/2012 03:03 PM    LDL, calculated 107 (H) 11/01/2012 03:03 PM    VLDL, calculated 18 11/01/2012 03:03 PM    Triglyceride 90 11/01/2012 03:03 PM    CHOL/HDL Ratio 2.7 11/01/2012 03:03 PM      BNP No results for input(s): BNPP in the last 72 hours. Liver Enzymes Recent Labs     07/04/21  1030   TP 6.3*   ALB 2.8*   AP 83      Thyroid Studies Lab Results   Component Value Date/Time    TSH 3.02 07/04/2021 10:30 AM                Assessment and Plan:     Kyleigh Owen is a 80 y.o. female with a PMHx of chronic diastolic CHF, chronic BLE edema, HTN, HLD, type II DM and anxiety who is admitted for acute on chronic diastolic CHF and hypertensive urgency. 1. Hypertensive urgencyresolved  2. Acute on chronic diastolic CHF  3. YASMANY on CKD stage III  4. HTN  5. HLD  6. Type II DMHbA1c 5.9 on 7/4/2021  7. Chronic BLE edema  8. Anxiety  9.  Constipation    Nephrology following, cardiology signed off  Echo report reviewedEF 50-55%  Follow-up renal US  Weaned off of nitroprusside drip  Continue Lasix 40 mg PO daily  Continue home BP meds losartan, metoprolol; discontinue home amlodipine  Continue IV hydralazine as needed  Strict I's and O's, daily weight  PT, OTrecommending inpatient rehab      Case discussed with:  [x]Patient  []Family  [x]Nursing  []Case Management  DVT prophylaxis: Lovenox  Diet: Cardiac  Contact: [employer] Mal[employer] (son)    [phone]  Code Status: Full  Disposition: Stable for transfer to telemetry, likely discharge to rehab in 2-3 days      H. Panda Quach DO  7/5/2021       Dragon medical dictation software was used for portions of this report. Unintended errors may occur.

## 2021-07-05 NOTE — PROGRESS NOTES
Problem: Mobility Impaired (Adult and Pediatric)  Goal: *Acute Goals and Plan of Care (Insert Text)  Description: Physical Therapy Goals  Initiated 7/5/2021 and to be accomplished within 7 day(s)  1. Patient will move from supine to sit and sit to supine in bed with modified independence. 2.  Patient will transfer from bed to chair and chair to bed with modified independence using the least restrictive device. 3.  Patient will perform sit to stand with modified independence. 4.  Patient will ambulate with modified independence for 50 feet with the least restrictive device. 5.  Patient will ascend/descend 3 stairs with handrail(s) with minimal assistance/contact guard assist.    PLOF: Amb with cane. Lives alone on first floor of two story home. Outcome: Progressing Towards Goal   PHYSICAL THERAPY EVALUATION    Patient: Candi Stanley (71 y.o. female)  Date: 7/5/2021  Primary Diagnosis: HTN (hypertension), malignant [I10]  Precautions: Fall, Skin  ASSESSMENT :  Niece present at bedside. Patient requesting to amb into bathroom. Oriented to self. Cues for place and time. Min A for supine to sit; management of LLE required. Seated EOB with good balance. Min A for sit to stand. Amb with ww and min A for 15ft. Poor safety awareness with ww. Constant cues for safe walker negotiation. Unsteady gait. Min A for stand to sit/sit to stand transfer from commode. Hand over hand assisted required for safety with use of ww for transfers. Amb 15ft with ww and min A to return to bed. Seated EOB with min A for stand to sit. Patient and niece both demonstrate decreased insight into patient deficits and safety. Min A for sit to supine with LLE management. Seated in bed with HOB elevated. Educated on need for RN assistance with mobility; verbalized understanding. Call bell in reach. Patient will benefit from skilled intervention to address the above impairments.   Patient's rehabilitation potential is considered to be Fair  Factors which may influence rehabilitation potential include:   []         None noted  []         Mental ability/status  [x]         Medical condition  []         Home/family situation and support systems  []         Safety awareness  []         Pain tolerance/management  []         Other:      PLAN :  Recommendations and Planned Interventions:   [x]           Bed Mobility Training             [x]    Neuromuscular Re-Education  [x]           Transfer Training                   []    Orthotic/Prosthetic Training  [x]           Gait Training                          []    Modalities  [x]           Therapeutic Exercises           []    Edema Management/Control  [x]           Therapeutic Activities            [x]    Family Training/Education  [x]           Patient Education  []           Other (comment):    Frequency/Duration: Patient will be followed by physical therapy 3-5 times a week to address goals. Discharge Recommendations: Rehab  Further Equipment Recommendations for Discharge: rolling walker     SUBJECTIVE:   Patient stated Artice Siemens went in there this morning.     OBJECTIVE DATA SUMMARY:     Past Medical History:   Diagnosis Date    Anxiety     Arthritis     Diabetes ([facility] [address]     Edema of extremities     Hematologic disorder     Hepatitis C carrier ([facility] [address]     Hypercholesterolemia     Hypertension     Insomnia     Labyrinthitis     Menopause     Primary osteoarthritis of knees, bilateral     Urge incontinence     Vitamin D deficiency      Past Surgical History:   Procedure Laterality Date    HX HYSTERECTOMY  1980    HX OTHER SURGICAL  2007    Lap Band for weight loss     Barriers to Learning/Limitations: yes;  altered mental status (i.e.Sedation, Confusion)  Compensate with: Visual Cues, Verbal Cues, Tactile Cues and Kinesthetic Cues    Home Situation:  Home Situation  Home Environment: Private residence  One/Two Story Residence: Two story, live on 1st floor  Living Alone: Yes  Support Systems: None  Patient Expects to be Discharged to[de-identified] House  Current DME Used/Available at Home: [employer] [place], rolling, Cane, straight    Critical Behavior:  Neurologic State: Alert  Orientation Level: Oriented to person  Cognition: Follows commands    Strength:    Manual Muscle Testing (LE)         R     L    Hip Flexion:   3+/5  3-/5  Knee EXT:   4/5  3/5  Knee FLEX:   4/5  3/5  Ankle DF:   4/5  3+/5  _________________________________________________   Range Of Motion:  BLE AROM WFL   Functional Mobility:  Bed Mobility:  Supine to Sit: Minimum assistance  Sit to Supine: Minimum assistance  Transfers:  Sit to Stand: Minimum assistance  Stand to Sit: Minimum assistance  Balance:   Sitting: Impaired  Sitting - Static: Good (unsupported)  Sitting - Dynamic: Good (unsupported)  Standing: Impaired  Standing - Static: Fair  Standing - Dynamic : Poor  Ambulation/Gait Training:  Distance (ft):  (15ftx2)   Assistive Device: Walker, rolling  Ambulation - Level of Assistance: Minimal assistance  Neuro Re-education:  Seated balance 8 minutes  Therapeutic Exercises:   Sit to stand x2  Pain:  Pain level pre-treatment: 0/10   Pain level post-treatment: 0/10     Activity Tolerance:   Fair    After treatment:   []         Patient left in no apparent distress sitting up in chair  [x]         Patient left in no apparent distress in bed  [x]         Call bell left within reach  [x]         Nursing notified  []         Caregiver present  []         Bed alarm activated  []         SCDs applied    COMMUNICATION/EDUCATION:   [x]         Role of physical therapy and plan of care in the acute care setting. [x]         Fall prevention education was provided and the patient/caregiver indicated understanding. [x]         Patient/family have participated as able in goal setting and plan of care. []         Patient/family agree to work toward stated goals and plan of care.   []         Patient understands intent and goals of therapy, but is neutral about his/her participation. []         Patient is unable to participate in goal setting/plan of care: ongoing with therapy staff.     Thank you for this referral.  Theodore Mask, PT   Time Calculation: 19 mins    Eval Complexity: History: MEDIUM  Complexity : 1-2 comorbidities / personal factors will impact the outcome/ POC Exam:MEDIUM Complexity : 3 Standardized tests and measures addressing body structure, function, activity limitation and / or participation in recreation  Presentation: MEDIUM Complexity : Evolving with changing characteristics  Clinical Decision Making:Medium Complexity   Clinical judgement; ROM, MMT, functional mobility Overall Complexity:MEDIUM

## 2021-07-05 NOTE — PROGRESS NOTES
Cardiology Progress Note    Admit Date: 7/3/2021  Attending Cardiologist: Dr. Jeramy Capone  Patient seen and examined independently. Blood pressure is much improved and so will hold on the  amlodipine. Patient appears to have hypertensive  nephropathy. She has preserved systolic function and mild concentric LVH. She believes her leg weakness is primarily related to ongoing left knee problems/arthritis. No further cardiac evaluation planned at this time. Will sign off and be available. Leo Montenegro MD  Assessment:     -HTN urgency, initial /122 on presentation to ER 7/3/2021.  -Elevated BNP without evidence of pulmonary edema on CXR 7/3/2021.  -Echo 7/4/2021: EF 50-55% with global hypokinesis with preserved septal function, normal LV cavity size, mild concentric LVH, normal RV size with low-normal function, no significant valvular disease  -CKD, Cr 1.62 with GFR 36 on presentation 7/3/2021, which is improved from Cr 2.32 and GFR 24 on 6/29/2021.    -Moderate emphysema at lung bases noted as incidental finding on CT abd/pelvis 6/29/2021.   -Recurrent falls  -DM  -Hypercholesterolemia    Plan:     -Will discontinue Amlodipine, BP much improved today. Continued on Losartan, PO Lopressor. Continue to monitor BP, adjust antihypertensives as needed per primary/nephrology teams.    -Volume management per nephrology team, appreciate assistance.  -No further recommendations from cardiac standpoint, will be available as needed if additional concerns arise. Subjective:     Reports constipation.     Objective:      Patient Vitals for the past 8 hrs:   Temp Pulse Resp BP SpO2   07/05/21 1121 97.5 °F (36.4 °C) 76 21 109/68 100 %   07/05/21 0757 97.9 °F (36.6 °C) 80 21 124/77 100 %   07/05/21 0400 98.2 °F (36.8 °C) 82 21  100 %         Patient Vitals for the past 96 hrs:   Weight   07/05/21 0400 83 kg (182 lb 15.7 oz)   07/04/21 0802 83.9 kg (185 lb)   07/04/21 0636 84.1 kg (185 lb 8 oz)   07/04/21 0400 83.5 kg (184 lb)   07/03/21 1016 85.7 kg (189 lb)       TELE: normal sinus rhythm               Current Facility-Administered Medications   Medication Dose Route Frequency Last Admin    docusate sodium (COLACE) capsule 100 mg  100 mg Oral  mg at 07/05/21 1003    ergocalciferol capsule 50,000 Units  50,000 Units Oral every Sunday 50,000 Units at 07/04/21 0810    gabapentin (NEURONTIN) capsule 300 mg  300 mg Oral  mg at 07/05/21 1003    losartan (COZAAR) tablet 100 mg  100 mg Oral DAILY 100 mg at 07/05/21 1003    metoprolol tartrate (LOPRESSOR) tablet 100 mg  100 mg Oral  mg at 07/05/21 1003    polyethylene glycol (MIRALAX) packet 17 g  17 g Oral DAILY 17 g at 07/05/21 1004    trospium (SANCTURA) tablet 20 mg  20 mg Oral DAILY 20 mg at 07/05/21 1003    furosemide (LASIX) injection 40 mg  40 mg IntraVENous DAILY 40 mg at 07/05/21 1003    sodium chloride (NS) flush 5-40 mL  5-40 mL IntraVENous Q8H 10 mL at 07/05/21 0646    sodium chloride (NS) flush 5-40 mL  5-40 mL IntraVENous PRN      acetaminophen (TYLENOL) tablet 650 mg  650 mg Oral Q6H  mg at 07/05/21 0650    Or    acetaminophen (TYLENOL) suppository 650 mg  650 mg Rectal Q6H PRN      polyethylene glycol (MIRALAX) packet 17 g  17 g Oral DAILY PRN 17 g at 07/05/21 0634    ondansetron (ZOFRAN ODT) tablet 4 mg  4 mg Oral Q8H PRN      Or    ondansetron (ZOFRAN) injection 4 mg  4 mg IntraVENous Q6H PRN      enoxaparin (LOVENOX) injection 30 mg  30 mg SubCUTAneous DAILY 30 mg at 07/05/21 1003    insulin lispro (HUMALOG) injection   SubCUTAneous AC&HS      glucose chewable tablet 16 g  4 Tablet Oral PRN      glucagon (GLUCAGEN) injection 1 mg  1 mg IntraMUSCular PRN      dextrose (D50W) injection syrg 12.5-25 g  25-50 mL IntraVENous PRN      hydrALAZINE (APRESOLINE) 20 mg/mL injection 20 mg  20 mg IntraVENous Q6H PRN 20 mg at 07/04/21 1626         Intake/Output Summary (Last 24 hours) at 7/5/2021 1157  Last data filed at 7/5/2021 7966  Gross per 24 hour   Intake 647.02 ml   Output 500 ml   Net 147.02 ml       Physical Exam:  General:  alert, cooperative, no distress, appears stated age  Neck:  supple  Lungs:  clear to auscultation bilaterally to anterolateral lung fields  Heart:  regular rate and rhythm  Abdomen:  abdomen is soft without significant tenderness, masses, organomegaly or guarding  Extremities:  atraumatic, no significant pitting edema    Visit Vitals  /68 (BP 1 Location: Left upper arm, BP Patient Position: At rest)   Pulse 76   Temp 97.5 °F (36.4 °C)   Resp 21   Ht 5' 4\" (1.626 m)   Wt 83 kg (182 lb 15.7 oz)   SpO2 100%   BMI 31.41 kg/m²       Data Review:     Labs: Results:       Chemistry Recent Labs     07/05/21  0533 07/04/21  1030 07/03/21  1051   GLU 93 160* 124*    136 138   K 4.5 4.3 4.1    107 109   CO2 23 22 22   BUN 27* 25* 23*   CREA 1.69* 1.49* 1.62*   CA 8.6 8.3* 8.8   AGAP 7 7 7   BUCR 16 17 14   AP  --  83 93   TP  --  6.3* 7.6   ALB  --  2.8* 3.2*   GLOB  --  3.5 4.4*   AGRAT  --  0.8 0.7*      CBC w/Diff Recent Labs     07/04/21  1030 07/03/21  1051   WBC 7.3 6.7   RBC 4.02* 4.36   HGB 11.4* 12.5   HCT 35.2 36.7    275   GRANS  --  71   LYMPH  --  20*   EOS  --  1      Lipid Panel Lab Results   Component Value Date/Time    Cholesterol, total 198 11/01/2012 03:03 PM    HDL Cholesterol 73 (H) 11/01/2012 03:03 PM    LDL, calculated 107 (H) 11/01/2012 03:03 PM    VLDL, calculated 18 11/01/2012 03:03 PM    Triglyceride 90 11/01/2012 03:03 PM    CHOL/HDL Ratio 2.7 11/01/2012 03:03 PM      Liver Enzymes Recent Labs     07/04/21  1030   TP 6.3*   ALB 2.8*   AP 83      Thyroid Studies Lab Results   Component Value Date/Time    TSH 3.02 07/04/2021 10:30 AM          Signed By: Addy Macias PA-C     July 5, 2021

## 2021-07-05 NOTE — PROGRESS NOTES
Progress Note         Patient: Flako Morin MRN: [id number]  CSN: [id number]    YOB: 1934  Age: 80 y.o. Sex: female    DOA: 7/3/2021 LOS:  LOS: 1 day                    Subjective:     Flako Morin is a 80 y.o. female with a PMHx of chronic diastolic CHF, chronic BLE edema, HTN, HLD, type II DM and anxiety who is admitted for acute on chronic diastolic CHF and hypertensive urgency. Seen today in room  Very sleepy, difficult to arouse  When she does awaken, she is lethargic  Reports that she is fine  Reportedly did not sleep last night    Off nitroprusside drip this afternoon      Objective:     Physical Exam:  Visit Vitals  /65 (BP 1 Location: Left upper arm, BP Patient Position: At rest)   Pulse 76   Temp 97.6 °F (36.4 °C)   Resp 19   Ht 5' 4\" (1.626 m)   Wt 83.9 kg (185 lb)   SpO2 100%   BMI 31.76 kg/m²        General:         Somnolent, no acute distress    HEENT: NC, Atraumatic. Anicteric sclerae. Lungs: CTA Bilaterally. No Wheezing/Rhonchi/Rales. Heart:  Regular  rhythm,  No murmur, No Rubs, No Gallops  Abdomen: Soft, Non distended, Non tender. +Bowel sounds, no HSM  Extremities: No c/c/e  Psych:   Not anxious or agitated. Neurologic:  Somnolent    Intake and Output:  Current Shift:  No intake/output data recorded. Last three shifts:  07/03 0701 - 07/04 1900  In: 1009.2 [P.O.:873; I.V.:136.2]  Out: 800 [Urine:800]    Labs: Results:       Chemistry Recent Labs     07/04/21  1030 07/03/21  1051   * 124*    138   K 4.3 4.1    109   CO2 22 22   BUN 25* 23*   CREA 1.49* 1.62*   CA 8.3* 8.8   AGAP 7 7   BUCR 17 14   AP 83 93   TP 6.3* 7.6   ALB 2.8* 3.2*   GLOB 3.5 4.4*   AGRAT 0.8 0.7*      CBC w/Diff Recent Labs     07/04/21  1030 07/03/21  1051   WBC 7.3 6.7   RBC 4.02* 4.36   HGB 11.4* 12.5   HCT 35.2 36.7    275   GRANS  --  71   LYMPH  --  20*   EOS  --  1      Cardiac Enzymes No results for input(s): CPK, CKND1, RADHA in the last 72 hours.     No lab exists for component: CKRMB, TROIP   Coagulation No results for input(s): PTP, INR, APTT, INREXT in the last 72 hours. Lipid Panel Lab Results   Component Value Date/Time    Cholesterol, total 198 11/01/2012 03:03 PM    HDL Cholesterol 73 (H) 11/01/2012 03:03 PM    LDL, calculated 107 (H) 11/01/2012 03:03 PM    VLDL, calculated 18 11/01/2012 03:03 PM    Triglyceride 90 11/01/2012 03:03 PM    CHOL/HDL Ratio 2.7 11/01/2012 03:03 PM      BNP No results for input(s): BNPP in the last 72 hours. Liver Enzymes Recent Labs     07/04/21  1030   TP 6.3*   ALB 2.8*   AP 83      Thyroid Studies Lab Results   Component Value Date/Time    TSH 3.02 07/04/2021 10:30 AM                Assessment and Plan:     Disha Piedra is a 80 y.o. female with a PMHx of chronic diastolic CHF, chronic BLE edema, HTN, HLD, type II DM and anxiety who is admitted for acute on chronic diastolic CHF and hypertensive urgency. 1. Hypertensive urgency  2. Acute on chronic diastolic CHF  3. [place]AS[place] on CKD stage III  4. HTN  5. HLD  6. Type II DMHbA1c 5.9 on 7/4/2021  7. Chronic BLE edema  8. Anxiety  9. Constipation    Cardiology and nephrology consulted and following  Echo report reviewed  Follow-up renal US  Weaned off of nitroprusside drip  Continue Lasix 40 mg IV daily  Continue home BP medsamlodipine, losartan, metoprolol  Continue IV hydralazine as needed  Strict I's and O's, daily weight  PT, OT      Case discussed with:  [x]Patient  []Family  [x]Nursing  []Case Management  DVT prophylaxis: Lovenox  Diet: Cardiac  Contact: March Pieter (son)    [phone]  Code Status: Full  Disposition: Continue current care and stepdown, greater than 2 nights      H. Wally Drake DO  7/4/2021       Dragon medical dictation software was used for portions of this report. Unintended errors may occur.

## 2021-07-05 NOTE — PROGRESS NOTES
Problem: Self Care Deficits Care Plan (Adult)  Goal: *Acute Goals and Plan of Care (Insert Text)  Description: Occupational Therapy Goals  Initiated 7/5/2021 within 7 day(s). 1.  Patient will perform grooming with modified independence. 2.  Patient will perform bathing with modified independence using adaptive equipment as needed. .  3.  Patient will perform upper body dressing and lower body dressing with modified independence using adaptive equipment as needed. 4.  Patient will perform toilet transfers with modified independence using RW. 5.  Patient will perform all aspects of toileting with modified independence. 6.  Patient will participate in upper extremity therapeutic exercise/activities with modified independence for 8 minutes. 7.  Patient will utilize energy conservation techniques during functional activities with min verbal cues. Prior Level of Function: Mod I with ADLs and functional mobility using SPC     Outcome: Progressing Towards Goal   OCCUPATIONAL THERAPY EVALUATION    Patient: Rubén Mcintosh (69 y.o. female)  Date: 7/5/2021  Primary Diagnosis: HTN (hypertension), malignant [I10]        Precautions:  Fall  PLOF: see above    ASSESSMENT :  Nursing/RN cleared for pt to participate in OT evaluation and tx session. Patient presents lying semi-reclined in bed. LUE impaired AROM with weakness noted in shoulder flexion and hand , which is inhibiting ADL tasks and functional mobility. Bed mobility: Min A supine to sit edge of bed using bed rails and head of bed elevated, Mod A BLE mgmt sit to supine. Poor safety awareness with bedside commode transfer, Max vc's for correct use of RW with poor carryover for keeping RW in front of self. Patient c/o 6/10 pain LLE with movement-nursing notified. Patient with increased fatigue s/p bedside commode transfer resulting in LB bathing while supine in bed w/ CGA.    Patient resting comfortably lying semi reclined in bed, call bell within reach & pt verbalized understanding and provided return demonstration to utilize for assist e.g. functional transfers in order to prevent falls, bed alarm intact. OT recommending inpatient rehab in order to achieve maximal functional potential with ADLs and functional transfers for safe d/c home at [place]. Patient will benefit from skilled intervention to address the above impairments. Patient's rehabilitation potential is considered to be Good  Factors which may influence rehabilitation potential include:   []             None noted  []             Mental ability/status  []             Medical condition  [x]             Home/family situation and support systems  [x]             Safety awareness  [x]             Pain tolerance/management  []             Other:      PLAN :  Recommendations and Planned Interventions:   [x]               Self Care Training                  [x]      Therapeutic Activities  [x]               Functional Mobility Training   []      Cognitive Retraining  [x]               Therapeutic Exercises           [x]      Endurance Activities  [x]               Balance Training                    [x]      Neuromuscular Re-Education  []               Visual/Perceptual Training     [x]      Home Safety Training  [x]               Patient Education                   [x]      Family Training/Education  []               Other (comment):    Frequency/Duration: Patient will be followed by occupational therapy 1-2 times per day/4-7 days per week to address goals. Discharge Recommendations: Inpatient Rehab  Further Equipment Recommendations for Discharge: bedside commode, RW and w/c     SUBJECTIVE:   Patient stated My leg hurts when I move it.  re: left LE with c/o 6/10 pain with movement-nursing notified    OBJECTIVE DATA SUMMARY:     Past Medical History:   Diagnosis Date    Anxiety     Arthritis     Diabetes ([facility] [address]     Edema of extremities     Hematologic disorder     Hepatitis C carrier ([facility] [address]     Hypercholesterolemia     Hypertension     Insomnia     Labyrinthitis     Menopause     Primary osteoarthritis of knees, bilateral     Urge incontinence     Vitamin D deficiency      Past Surgical History:   Procedure Laterality Date    HX HYSTERECTOMY  1980    HX OTHER SURGICAL  2007    Lap Band for weight loss     Barriers to Learning/Limitations: yes;  cognitive and altered mental status (i.e.Sedation, Confusion)  Compensate with: visual, verbal, tactile, kinesthetic cues/model    Home Situation:   Home Situation  Home Environment: Private residence  # Steps to Enter: 4  Rails to Enter: Yes  Hand Rails : Bilateral  One/Two Story Residence: One story  Living Alone: Yes  Support Systems: Friends \ neighbors  Patient Expects to be Discharged to[de-identified] [employer]  Current DME Used/Available at Home: Milbert Kimber, rolling, Cane, straight, Shower chair, Grab bars  Tub or Shower Type: Shower  [x]  Right hand dominant   []  Left hand dominant    Cognitive/Behavioral Status:  Neurologic State: Alert  Orientation Level: Oriented X4  Cognition: Follows commands  Safety/Judgement: Fall prevention    Skin: appears intact    Edema: none noted    Vision/Perceptual:     appears intact, able to tell correct time on wall clock     Corrective Lenses: Glasses    Coordination: BUE  Coordination:  (RUE WFL, RUE impaired)  Fine Motor Skills-Upper: Left Impaired;Right Intact    Gross Motor Skills-Upper: Left Impaired;Right Intact    Balance:  Sitting: Impaired  Sitting - Static: Good (unsupported)  Sitting - Dynamic: Fair (occasional)  Standing: Impaired; With support  Standing - Static: Fair  Standing - Dynamic : Poor    Strength: BUE  Strength:  (RUE 4-/5,LUE shldr flex 2-/5,ext 3+/5, elb 3/5, handgrip 3/5)                Tone & Sensation: BUE  Tone:  (RUE normal, LUE hypotonic)  Sensation: Intact       Range of Motion: BUE  AROM:  (RUE WFL, LUE impaired)  PROM:  (LUE WFL)       Functional Mobility and Transfers for ADLs:  Bed Mobility: Supine to Sit: Minimum assistance  Sit to Supine: Moderate assistance (BLE mgmt)     Transfers:  Sit to Stand: Minimum assistance  Stand to Sit: Minimum assistance       Toilet Transfer : Moderate assistance (bedside commode)       ADL Assessment:   Feeding: Setup;Supervision    Oral Facial Hygiene/Grooming: Stand-by assistance    Bathing: Minimum assistance    Upper Body Dressing: Moderate assistance    Lower Body Dressing: Maximum assistance    Toileting: Minimum assistance        ADL Intervention:       Cognitive Retraining  Safety/Judgement: Fall prevention    Pain:  Pain level pre-treatment: 0/10   Pain level post-treatment: 0/10       Activity Tolerance:   poor  Please refer to the flowsheet for vital signs taken during this treatment. After treatment:   [] Patient left in no apparent distress sitting up in chair  [x] Patient left in no apparent distress in bed  [x] Call bell left within reach  [x] Nursing notified  [] Caregiver present  [x] Bed alarm activated    COMMUNICATION/EDUCATION:   [x] Role of Occupational Therapy in the acute care setting  [x] Home safety education was provided and the patient/caregiver indicated understanding. [x] Patient/family have participated as able in goal setting and plan of care. [x] Patient/family agree to work toward stated goals and plan of care. [] Patient understands intent and goals of therapy, but is neutral about his/her participation. [] Patient is unable to participate in goal setting and plan of care. Thank you for this referral.  Darcus Flock  Time Calculation: 27 mins    Eval Complexity: History: MEDIUM Complexity : Expanded review of history including physical, cognitive and psychosocial  history ; Examination: MEDIUM Complexity : 3-5 performance deficits relating to physical, cognitive , or psychosocial skils that result in activity limitations and / or participation restrictions;    Decision Making:MEDIUM Complexity : Patient may present with comorbidities that affect occupational performnce.  Miniml to moderate modification of tasks or assistance (eg, physical or verbal ) with assesment(s) is necessary to enable patient to complete evaluation

## 2021-07-05 NOTE — PROGRESS NOTES
conducted an initial consultation and Spiritual Assessment for Kamila Curtis, who is a 80 y.o.,female. Patients Primary Language is: [place]. According to the patients EMR Gnosticism Affiliation is: Anabaptist.     The reason the Patient came to the hospital is:   Patient Active Problem List    Diagnosis Date Noted    Hypertensive urgency 07/04/2021    HTN (hypertension), malignant 07/03/2021    Severe obesity ([address] 02/06/2020    Acute labyrinthitis 10/28/2014    Urge incontinence 03/27/2014        The  provided the following Interventions:  Initiated a relationship of care and support. Explored issues of meliza, belief, spirituality and Pentecostal/ritual needs while hospitalized. Listened empathically. Patient was asked if she has Advance Medical Directive (AMD) at home or on file. Patient asked  what is an AMD.  Provided chaplaincy education. AMD discussed and offered. Provided information about Spiritual Care Services. Offered assurance of continued prayers on patient's behalf. Patient showed interest to fill out one and named her nephew Charity Ponce as her primary decision maker. However, discussion was interrupted with food tray being delivered and she decided to hold off doing AMD at this time. Chart reviewed. The following outcomes where achieved:  Patient shared limited information about both her medical narrative and spiritual journey/beliefs.  confirmed Patient's Gnosticism Affiliation. Patient processed feeling about current hospitalization. Patient expressed gratitude for 's visit. Assessment:  Patient does not have any Pentecostal/cultural needs that will affect patients preferences in health care. There are no spiritual or Pentecostal issues which require intervention at this time. Plan:  Chaplains will continue to follow and will provide pastoral care on an as needed/requested basis.    recommends bedside caregivers page  on duty if patient shows signs of acute spiritual or emotional distress.     [facility]   [phone]

## 2021-07-05 NOTE — ROUTINE PROCESS
Bedside and Verbal shift change report given to Lima Lyles RN (oncoming nurse) by Tammi Richard RN   (offgoing nurse).  Report included the following information SBAR, Kardex, MAR and Recent Results.

## 2021-07-05 NOTE — PROGRESS NOTES
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   - HTN Urgency   - HFpEF and diastolic Dysfunction   - DM     Plan   - Kidney function is stable , her Cr from last month was in the 2 range and now in the one range which can indicate that she had an YASMANY that is resolving , will continue to monitor daily labs to establish a baseline  - Quantify proteinuria and check kidney US   - BP is better controlled , On ARB , will change Lasix to PO , volume status look good   - Low salt diet   - Cardiology following    - Avoid nephrotoxins   - Follow labs       ·                                                                                                                                Subjective:  Patient States that she is doing better today   BP is better controlled       Patient Active Problem List   Diagnosis Code    Urge incontinence N39.41    Acute labyrinthitis H83.09    Severe obesity ([facility] [address] E66.01    HTN (hypertension), malignant I10    Hypertensive urgency I16.0       Current Facility-Administered Medications   Medication Dose Route Frequency Provider Last Rate Last Admin    mineral oil (FLEET) enema   Rectal PRN Nolon Stake, DO        docusate sodium (COLACE) capsule 100 mg  100 mg Oral BID Esteban Fisher MD   100 mg at 07/05/21 1003    ergocalciferol capsule 50,000 Units  50,000 Units Oral every Sunday Esteban Fisher MD   50,000 Units at 07/04/21 0810    gabapentin (NEURONTIN) capsule 300 mg  300 mg Oral TID Esteban Fisher MD   300 mg at 07/05/21 1003    losartan (COZAAR) tablet 100 mg  100 mg Oral DAILY Esteban Fisher MD   100 mg at 07/05/21 1003    metoprolol tartrate (LOPRESSOR) tablet 100 mg  100 mg Oral BID Esteban Fisher MD   100 mg at 07/05/21 1003    polyethylene glycol (MIRALAX) packet 17 g  17 g Oral DAILY Esteban Fisher MD   17 g at 07/05/21 1004    trospium (SANCTURA) tablet 20 mg  20 mg Oral DAILY Johana Lopes MD   20 mg at 07/05/21 1003    furosemide (LASIX) injection 40 mg  40 mg IntraVENous DAILY Johana Lopes MD   40 mg at 07/05/21 1003    sodium chloride (NS) flush 5-40 mL  5-40 mL IntraVENous Q8H Johana Lopes MD   10 mL at 07/05/21 0646    sodium chloride (NS) flush 5-40 mL  5-40 mL IntraVENous PRN Johana Lopes MD        acetaminophen (TYLENOL) tablet 650 mg  650 mg Oral Q6H PRN Johana Lopes MD   650 mg at 07/05/21 7247    Or    acetaminophen (TYLENOL) suppository 650 mg  650 mg Rectal Q6H PRN Johana Lopes MD        polyethylene glycol [facility]) packet 17 g  17 g Oral DAILY PRN Johana Lopes MD   17 g at 07/05/21 6141    ondansetron (ZOFRAN ODT) tablet 4 mg  4 mg Oral Q8H PRN Johana Lopes MD        Or    ondansetron [facility]) injection 4 mg  4 mg IntraVENous Q6H PRN Johana Lopes MD        enoxaparin (LOVENOX) injection 30 mg  30 mg SubCUTAneous DAILY Johana Lopes MD   30 mg at 07/05/21 1003    insulin lispro (HUMALOG) injection   SubCUTAneous AC&HS Johana Lopes MD        glucose chewable tablet 16 g  4 Tablet Oral PRN Johana Lopes MD        glucagon [facility]) injection 1 mg  1 mg IntraMUSCular PRN Johana Lopes MD        dextrose (D50W) injection syrg 12.5-25 g  25-50 mL IntraVENous PRN Johana Lopes MD        hydrALAZINE (APRESOLINE) 20 mg/mL injection 20 mg  20 mg IntraVENous Q6H PRN Sandra Kimball DO   20 mg at 07/04/21 1626       Objective  Vitals:    07/04/21 1957 07/05/21 0400 07/05/21 0757 07/05/21 1121   BP: 116/65  124/77 109/68   Pulse: 76 82 80 76   Resp: 19 21 21 21   Temp: 97.6 °F (36.4 °C) 98.2 °F (36.8 °C) 97.9 °F (36.6 °C) 97.5 °F (36.4 °C)   SpO2: 100% 100% 100% 100%   Weight:  83 kg (182 lb 15.7 oz)     Height:             Intake/Output Summary (Last 24 hours) at 7/5/2021 1430  Last data filed at 7/5/2021 0939  Gross per 24 hour   Intake 647.02 ml   Output 500 ml   Net 147.02 ml           Admission weight: Weight: 85.7 kg (189 lb) (07/03/21 1016)  Last Weight Metrics:  Weight Loss Metrics 7/5/2021 6/29/2021 6/25/2021 2/11/2021 3/6/2020 2/6/2020 12/11/2017   Today's Wt 182 lb 15.7 oz 189 lb 189 lb 195 lb 214 lb 12.8 oz 212 lb 9.6 oz 223 lb 6.4 oz   BMI 31.41 kg/m2 32.44 kg/m2 32.44 kg/m2 33.47 kg/m2 36.87 kg/m2 36.49 kg/m2 38.35 kg/m2             Physical Assessment:     General: NAD, alert and oriented. Neck: No jvd. LUNGS: Clear to Auscultation, No rales, rhonchi or wheezes. CVS EXM: S1, S2  RRR, no murmurs/gallops/rubs. Abdomen: soft, non tender. Lower Extremities:  no edema.        Lab    CBC w/Diff Recent Labs     07/04/21  1030 07/03/21  1051   WBC 7.3 6.7   RBC 4.02* 4.36   HGB 11.4* 12.5   HCT 35.2 36.7    275   GRANS  --  71   LYMPH  --  20*   EOS  --  1        Chemistry Recent Labs     07/05/21  0533 07/04/21  1030 07/03/21  1051   GLU 93 160* 124*    136 138   K 4.5 4.3 4.1    107 109   CO2 23 22 22   BUN 27* 25* 23*   CREA 1.69* 1.49* 1.62*   CA 8.6 8.3* 8.8   AGAP 7 7 7   BUCR 16 17 14   AP  --  83 93   TP  --  6.3* 7.6   ALB  --  2.8* 3.2*   GLOB  --  3.5 4.4*   AGRAT  --  0.8 0.7*         No results found for: IRON, FE, TIBC, IBCT, PSAT, FERR   Lab Results   Component Value Date/Time    Calcium 8.6 07/05/2021 05:33 AM          Donna Diaz MD  7/5/2021  2:30 PM

## 2021-07-06 NOTE — PROGRESS NOTES
Progress Note         Patient: Joyce Lerma MRN: [id number]  CSN: [id number]    YOB: 1934  Age: 80 y.o. Sex: female    DOA: 7/3/2021 LOS:  LOS: 3 days                    Subjective:     Joyce Lerma is a 80 y.o. female with a PMHx of chronic diastolic CHF, chronic BLE edema, HTN, HLD, type II DM and anxiety who is admitted for acute on chronic diastolic CHF and hypertensive urgency. Blood pressure much improved since admission  Seen today in room  Still quite sleepy today but more alert than yesterday  Alert and oriented x2  Complains of significant generalized weakness, especially in BLE    Has a very hard time answering questions today due to lethargy/sleepiness      Objective:     Physical Exam:  Visit Vitals  /71   Pulse 80   Temp 97.9 °F (36.6 °C)   Resp 20   Ht 5' 4\" (1.626 m)   Wt 83.4 kg (183 lb 14.4 oz)   SpO2 100%   BMI 31.57 kg/m²        General:         Sleepy, lethargic alert and oriented x2, no acute distress    HEENT: NC, Atraumatic. Anicteric sclerae. Lungs: CTA Bilaterally. No Wheezing/Rhonchi/Rales. Heart:  Regular  rhythm,  No murmur, No Rubs, No Gallops  Abdomen: Soft, Non distended, Non tender. +Bowel sounds, no HSM  Extremities: No c/c/e  Psych:   Not anxious or agitated.   Neurologic:  Alert and oriented x3    Intake and Output:  Current Shift:  07/06 0701 - 07/06 1900  In: 240 [P.O.:240]  Out: 700 [Urine:700]  Last three shifts:  07/04 1901 - 07/06 0700  In: 720 [P.O.:720]  Out: 1300 [Urine:1300]    Labs: Results:       Chemistry Recent Labs     07/06/21  0550 07/05/21  0533 07/04/21  1030   * 93 160*   * 137 136   K 3.9 4.5 4.3    107 107   CO2 22 23 22   BUN 35* 27* 25*   CREA 2.09* 1.69* 1.49*   CA 8.1* 8.6 8.3*   AGAP 7 7 7   BUCR 17 16 17   AP  --   --  83   TP  --   --  6.3*   ALB  --   --  2.8*   GLOB  --   --  3.5   AGRAT  --   --  0.8      CBC w/Diff Recent Labs     07/04/21  1030   WBC 7.3   RBC 4.02*   HGB 11.4*   HCT 35.2       Cardiac Enzymes No results for input(s): CPK, CKND1, RADHA in the last 72 hours. No lab exists for component: CKRMB, TROIP   Coagulation No results for input(s): PTP, INR, APTT, INREXT, INREXT in the last 72 hours. Lipid Panel Lab Results   Component Value Date/Time    Cholesterol, total 198 11/01/2012 03:03 PM    HDL Cholesterol 73 (H) 11/01/2012 03:03 PM    LDL, calculated 107 (H) 11/01/2012 03:03 PM    VLDL, calculated 18 11/01/2012 03:03 PM    Triglyceride 90 11/01/2012 03:03 PM    CHOL/HDL Ratio 2.7 11/01/2012 03:03 PM      BNP No results for input(s): BNPP in the last 72 hours. Liver Enzymes Recent Labs     07/04/21  1030   TP 6.3*   ALB 2.8*   AP 83      Thyroid Studies Lab Results   Component Value Date/Time    TSH 3.02 07/04/2021 10:30 AM                Assessment and Plan:     Christopher Frankel is a 80 y.o. female with a PMHx of chronic diastolic CHF, chronic BLE edema, HTN, HLD, type II DM and anxiety who is admitted for acute on chronic diastolic CHF and hypertensive urgency. 1. Hypertensive urgencyresolved  2. Acute on chronic diastolic CHF  3. YASMANY on CKD stage III  4. Hypertensive encephalopathy/delirium  5. HTN  6. HLD  7. Type II DMHbA1c 5.9 on 7/4/2021  8. Chronic BLE edema  9. Anxiety  10.  Constipation    Nephrology following, cardiology signed off  Echo report reviewedEF 50-55%  Renal US report reviewed  Weaned off of nitroprusside drip  Continue Lasix 20 mg PO daily  Continue and decrease home BP meds losartan to 50 mg daily, metoprolol to 25 mg twice daily; discontinue home amlodipine  Continue IV hydralazine as needed  Continue SSI with Accu-Cheks  Continue gabapentin, but at decreased dose  Strict I's and O's, daily weight  PT, OTrecommending inpatient rehab      Case discussed with:  [x]Patient  []Family  [x]Nursing  [x]Case Management  DVT prophylaxis: Lovenox  Diet: Cardiac  Contact: Lurdes Lawson (son)    [phone]  Code Status: Full  Disposition:-Transfered to telemetry, she is ready for discharge to SNF when accepted      HJose L Chávez DO  7/6/2021       Dragon medical dictation software was used for portions of this report. Unintended errors may occur.

## 2021-07-06 NOTE — PROGRESS NOTES
0700: Bedside and Verbal shift change report given to IVANA Baires (oncoming nurse) by Goldie Eng RN (offgoing nurse). Report included the following information SBAR, Kardex, MAR and Cardiac Rhythm NSR.     1603: Pt resting quietly in bed. No complaints at this time. 1723: Pt eating dinner tray. Pt has hard time feeding herself, asked CNA to assist patient. Medications administered. Pt stable. No complaints of pain at this time. 1900: Bedside and Verbal shift change report given to IVANA Akins (oncoming nurse) by Cris Raman RN (offgoing nurse). Report included the following information SBAR, Kardex, MAR and Cardiac Rhythm NSR WITH RUN PVC'S.

## 2021-07-06 NOTE — PROGRESS NOTES
Patient without any significant events overnight. No c/o pain or SOB. No BM, 400ml urine output. Bedside and Verbal shift change report given to Anoop Jc RN (oncoming nurse) by Atul Mays RN   (offgoing nurse). Report included the following information SBAR, Kardex, MAR and Recent Results.

## 2021-07-06 NOTE — PROGRESS NOTES
Attempted pt for OT tx X 2. Pt unable to be seen at this time 2/2 having recieved an enema. Will continue to f/u as schedule allows. Thank you.   ARMAAN Crowell/NEHEMIAS

## 2021-07-06 NOTE — ROUTINE PROCESS
Bedside shift change report given to IVANA Akins (oncoming nurse) by Kim Madrid RN (offgoing nurse). Report included the following information SBAR, Kardex, Intake/Output, MAR, Recent Results and Cardiac Rhythm NSR.

## 2021-07-06 NOTE — PROGRESS NOTES
In Patient Progress note      Admit Date: 7/3/2021          Impression:     1. 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 HTN Urgency   3. HFpEF and diastolic Dysfunction   4. DM      Plan     - decrease lasix 20 mg daily  - Low salt diet   - follow cardiology recs   - avoid too tight bp control , decrease losartan to 50 daily and metoprolol to   25 BID   - Follow labs     Please call with questions    Pedro Kidd MD FASN  Cell [phone]  Pager: [phone]       Subjective:     - No acute over night events. - respiratory - stable  - hemodynamics - stable, no pressrs  - UOP- good  - Nutrition -poor    Objective:     Visit Vitals  /71   Pulse 80   Temp 97.9 °F (36.6 °C)   Resp 20   Ht 5' 4\" (1.626 m)   Wt 83.4 kg (183 lb 14.4 oz)   SpO2 100%   BMI 31.57 kg/m²         Intake/Output Summary (Last 24 hours) at 7/6/2021 1630  Last data filed at 7/6/2021 1318  Gross per 24 hour   Intake 360 ml   Output 2000 ml   Net -1640 ml       Physical Exam:     General: NAD, alert and oriented. Neck: No jvd. LUNGS: Clear to Auscultation, No rales, rhonchi or wheezes. CVS EXM: S1, S2  RRR, no murmurs/gallops/rubs. Abdomen: soft, non tender. Lower Extremities:  no edema.        Data Review:    Recent Labs     07/04/21  1030   WBC 7.3   RBC 4.02*   HCT 35.2   MCV 87.6   MCH 28.4   MCHC 32.4   RDW 14.6*     Recent Labs     07/06/21  0550 07/05/21  0533 07/04/21  1030   BUN 35* 27* 25*   CREA 2.09* 1.69* 1.49*   CA 8.1* 8.6 8.3*   ALB  --   --  2.8*   K 3.9 4.5 4.3   * 137 136    107 107   CO2 22 23 22   * 93 160*       Zain Castillo MD

## 2021-07-06 NOTE — PROGRESS NOTES
Problem: Pressure Injury - Risk of  Goal: *Prevention of pressure injury  Description: Document John Scale and appropriate interventions in the flowsheet. Outcome: Progressing Towards Goal  Note: Pressure Injury Interventions:  Sensory Interventions: Assess changes in LOC    Moisture Interventions: Absorbent underpads    Activity Interventions: Pressure redistribution bed/mattress(bed type)    Mobility Interventions: HOB 30 degrees or less, Pressure redistribution bed/mattress (bed type)    Nutrition Interventions: Document food/fluid/supplement intake    Friction and Shear Interventions: HOB 30 degrees or less, Foam dressings/transparent film/skin sealants                Problem: Patient Education: Go to Patient Education Activity  Goal: Patient/Family Education  Outcome: Progressing Towards Goal     Problem: Falls - Risk of  Goal: *Absence of Falls  Description: Document Oswaldo Fall Risk and appropriate interventions in the flowsheet.   Outcome: Progressing Towards Goal  Note: Fall Risk Interventions:  Mobility Interventions: Communicate number of staff needed for ambulation/transfer    Mentation Interventions: Door open when patient unattended    Medication Interventions: Bed/chair exit alarm    Elimination Interventions: Call light in reach    History of Falls Interventions: Door open when patient unattended         Problem: Patient Education: Go to Patient Education Activity  Goal: Patient/Family Education  Outcome: Progressing Towards Goal     Problem: Patient Education: Go to Patient Education Activity  Goal: Patient/Family Education  Outcome: Progressing Towards Goal     Problem: Patient Education: Go to Patient Education Activity  Goal: Patient/Family Education  Outcome: Progressing Towards Goal

## 2021-07-07 NOTE — PROGRESS NOTES
Progress Note         Patient: Loco Das MRN: [id number]  CSN: [id number]    YOB: 1934  Age: 80 y.o. Sex: female    DOA: 7/3/2021 LOS:  LOS: 4 days                    Subjective:   Patient independently seen and examined at bedside. Has a very hard time answering questions today due to lethargy/sleepiness  PT OT reports increased confusion and lethargy compared to prior day. Nursing reports constipation  Was apparently pocketing food today. Objective:     Physical Exam:  Visit Vitals  /77 (BP 1 Location: Left upper arm, BP Patient Position: At rest)   Pulse 85   Temp 97.8 °F (36.6 °C)   Resp 20   Ht 5' 4\" (1.626 m)   Wt 79.6 kg (175 lb 8 oz)   SpO2 98%   BMI 30.12 kg/m²        General:         Sleepy, lethargic alert and oriented x2, no acute distress    HEENT: NC, Atraumatic. Anicteric sclerae. Lungs: CTA Bilaterally. No Wheezing/Rhonchi/Rales. Heart:  Regular  rhythm,  No murmur, No Rubs, No Gallops  Abdomen: Soft, Non distended, Non tender. +Bowel sounds, no HSM  Extremities: No c/c/e  Psych:   Not anxious or agitated. Neurologic:  Lethargic, difficult to wake up    Intake and Output:  Current Shift:  07/07 0701 - 07/07 1900  In: 0   Out: 200 [Urine:200]  Last three shifts:  07/05 1901 - 07/07 0700  In: 340 [P.O.:340]  Out: 1600 [Urine:1600]    Labs: Results:       Chemistry Recent Labs     07/07/21  0215 07/06/21  0550 07/05/21  0533   * 116* 93   * 134* 137   K 4.3 3.9 4.5    105 107   CO2 18* 22 23   BUN 41* 35* 27*   CREA 2.51* 2.09* 1.69*   CA 8.3* 8.1* 8.6   AGAP 8 7 7   BUCR 16 17 16      CBC w/Diff No results for input(s): WBC, RBC, HGB, HCT, PLT, GRANS, LYMPH, EOS, HGBEXT, HCTEXT, PLTEXT, HGBEXT, HCTEXT, PLTEXT in the last 72 hours. Cardiac Enzymes No results for input(s): CPK, CKND1, RADHA in the last 72 hours.     No lab exists for component: CKRMB, TROIP   Coagulation No results for input(s): PTP, INR, APTT, INREXT, INREXT in the last 72 hours. Lipid Panel Lab Results   Component Value Date/Time    Cholesterol, total 198 11/01/2012 03:03 PM    HDL Cholesterol 73 (H) 11/01/2012 03:03 PM    LDL, calculated 107 (H) 11/01/2012 03:03 PM    VLDL, calculated 18 11/01/2012 03:03 PM    Triglyceride 90 11/01/2012 03:03 PM    CHOL/HDL Ratio 2.7 11/01/2012 03:03 PM      BNP No results for input(s): BNPP in the last 72 hours. Liver Enzymes No results for input(s): TP, ALB, TBIL, AP in the last 72 hours. No lab exists for component: SGOT, GPT, DBIL   Thyroid Studies Lab Results   Component Value Date/Time    TSH 3.02 07/04/2021 10:30 AM                Assessment and Plan:     Castillo Portillo is a 80 y.o. female with a PMHx of chronic diastolic CHF, chronic BLE edema, HTN, HLD, type II DM and anxiety who is admitted for acute on chronic diastolic CHF and hypertensive urgency. 1. Hypertensive urgencyresolved, off nitroprusside drip  2. Acute on chronic diastolic CHF: EF 20-03%  3. YASMANY on CKD stage III  4. Hypertensive encephalopathy/delirium  5. HTN  6. HLD  7. Type II DMHbA1c 5.9 on 7/4/2021  8. Chronic BLE edema  9. Anxiety  10. Constipation    Discussed with Dr. Simona Valentin: Hold losartan and Lasix today due to increased creatinine  cardiology signed off  Continue and decrease home BP meds  metoprolol to 25 mg twice daily  Continue IV hydralazine as needed  Continue SSI with Accu-Cheks  Continue gabapentin, but at decreased dose  Strict I's and O's, daily weight  PT, OTrecommending inpatient rehab  Speech eval in a.m. Case discussed with:  [x]Patient  []Family  [x]Nursing  [x]Case Management  DVT prophylaxis: Lovenox  Diet: Cardiac  Contact: Edilson Estrada (son)    [phone]  Code Status: Full  Disposition:-Transfered to telemetry, she is ready for discharge to SNF when accepted      Anahi Whitaker DO  7/7/2021       Dragon medical dictation software was used for portions of this report. Unintended errors may occur.

## 2021-07-07 NOTE — ROUTINE PROCESS
1925:Bedside and Verbal shift change report received from [facility] RN(offgoing nurse). Report included the following information SBAR, Kardex, Intake/Output, MAR, Recent Results and Cardiac Rhythm SR.     2148: . No coverage provided per protocol.

## 2021-07-07 NOTE — PROGRESS NOTES
Problem: Self Care Deficits Care Plan (Adult)  Goal: *Acute Goals and Plan of Care (Insert Text)  Description: Occupational Therapy Goals  Initiated 7/5/2021 within 7 day(s). 1.  Patient will perform grooming with modified independence. 2.  Patient will perform bathing with modified independence using adaptive equipment as needed. .  3.  Patient will perform upper body dressing and lower body dressing with modified independence using adaptive equipment as needed. 4.  Patient will perform toilet transfers with modified independence using RW. 5.  Patient will perform all aspects of toileting with modified independence. 6.  Patient will participate in upper extremity therapeutic exercise/activities with modified independence for 8 minutes. 7.  Patient will utilize energy conservation techniques during functional activities with min verbal cues. Prior Level of Function: Mod I with ADLs and functional mobility using SPC     Outcome: Progressing Towards Goal  OCCUPATIONAL THERAPY TREATMENT    Patient: Candi Stanley (44 y.o. female)  Date: 7/7/2021  Diagnosis: HTN (hypertension), malignant [I10] Hypertensive urgency       Precautions: Fall    Chart, occupational therapy assessment, plan of care, and goals were reviewed. ASSESSMENT:  Pt is asleep upon entry, wakes up to voice cues and agreeable to participate in OT. Pt is seen with PT to increase safety of the pt and staff during functional mobility and ADLs. Pt is very drowsy this session and requires additional time and Max VCs for repetition in order to answer questions. Pt is also inconsistently follows commands. Per OT evaluation on 7/5 pt required Min A to maneuver to EOB and was able to perform functional txfr to [facility] for toileting. At this time pt required Max Ax2 for sup->sit and was not able to maintain sitting balance at EOB for ADLs. Pt is incontinent of urine, required Max A for bhavin-care.  Pt with difficulty tracking to all quadrants, possibly due to decreased commands following. Pt also demonstrates decreased coordination and severe bilateral UEs weakness, requiring Mod-Max A to self-feed. Pt is pocketing food in spite of Max VCs for swallowing, was able to spit food out with VCs. Pt may benefit from SLP consult to ensure safety with feeding. Pt's nurse and MD notified of recent change in pt's functional status since 7/5. Progression toward goals:  []          Improving appropriately and progressing toward goals  [x]          Improving slowly and progressing toward goals  []          Not making progress toward goals and plan of care will be adjusted     PLAN:  Patient continues to benefit from skilled intervention to address the above impairments. Continue treatment per established plan of care. Discharge Recommendations:  Haim Villar  Further Equipment Recommendations for Discharge: TBD     SUBJECTIVE:   Patient stated I'm hungry.     OBJECTIVE DATA SUMMARY:   Cognitive/Behavioral Status:  Neurologic State: Confused, Lethargic  Orientation Level: Oriented to person  Cognition: Decreased command following, Decreased attention/concentration, Impaired decision making  Safety/Judgement: Awareness of environment    Functional Mobility and Transfers for ADLs:   Bed Mobility:  Rolling: Moderate assistance;Maximum assistance  Supine to Sit: Maximum assistance;Assist x2  Sit to Supine: Maximum assistance;Assist x2   Balance:  Sitting: Impaired  Sitting - Static: Poor (constant support)    ADL Intervention:  Feeding  Feeding Assistance: Moderate assistance  Food to Mouth: Moderate assistance  Drink to Mouth: Moderate assistance    Grooming  Grooming Assistance: Moderate assistance  Washing Face: Moderate assistance  Lower Body Dressing Assistance  Socks:  Total assistance (dependent)    Cognitive Retraining  Safety/Judgement: Awareness of environment    Pain:  Pain level pre-treatment: 0/10   Pain level post-treatment: 0/10    Activity Tolerance:    limited  Please refer to the flowsheet for vital signs taken during this treatment. After treatment:   []  Patient left in no apparent distress sitting up in chair  [x]  Patient left in no apparent distress in bed  [x]  Call bell left within reach  [x]  Nursing notified  []  Caregiver present  [x]  Bed alarm activated    COMMUNICATION/EDUCATION:   [x] Role of Occupational Therapy in the acute care setting  [] Home safety education was provided and the patient/caregiver indicated understanding. [] Patient/family have participated as able in working towards goals and plan of care. [] Patient/family agree to work toward stated goals and plan of care. [x] Patient understands intent and goals of therapy, but is neutral about his/her participation. [] Patient is unable to participate in goal setting and plan of care.       Thank you for this referral.  Cesia Mata, OTR/L  Time Calculation: 31 mins

## 2021-07-07 NOTE — PROGRESS NOTES
CM called and spoke to patient's nephew Junito Tellez [phone], and he said he would review Skilled Nursing Facilities online for SNF choices for discharge plan. CM to contact patient in the morning, or nephew will contact CM for SNF choices.            Tan Araujo, RN  Case Management [phone]

## 2021-07-07 NOTE — PROGRESS NOTES
CM called patient's sister Yoandy Jenkins [phone] to complete an initial assessment, received voicemail, CM left name and phone number for a return call.          Jessica Reed RN  Case Management [phone]

## 2021-07-07 NOTE — PROGRESS NOTES
CM went and spoke to patient to do an initial assessment. Patient falling asleep, would not answer questions, but did agree to go to SNF, and for MARKIE to call her sister Jenny Santos [phone] for initial assessment.         Jessica Vaughan RN  Case Management [phone]

## 2021-07-07 NOTE — PROGRESS NOTES
Problem: Pressure Injury - Risk of  Goal: *Prevention of pressure injury  Description: Document John Scale and appropriate interventions in the flowsheet.   Outcome: Progressing Towards Goal  Note: Pressure Injury Interventions:  Sensory Interventions: Assess need for specialty bed    Moisture Interventions: Absorbent underpads    Activity Interventions: Pressure redistribution bed/mattress(bed type)    Mobility Interventions: HOB 30 degrees or less    Nutrition Interventions: Document food/fluid/supplement intake    Friction and Shear Interventions: HOB 30 degrees or less                Problem: Patient Education: Go to Patient Education Activity  Goal: Patient/Family Education  Outcome: Progressing Towards Goal

## 2021-07-07 NOTE — PROGRESS NOTES
Problem: Mobility Impaired (Adult and Pediatric)  Goal: *Acute Goals and Plan of Care (Insert Text)  Description: Physical Therapy Goals  Initiated 7/5/2021 and to be accomplished within 7 day(s)  1. Patient will move from supine to sit and sit to supine in bed with modified independence. 2.  Patient will transfer from bed to chair and chair to bed with modified independence using the least restrictive device. 3.  Patient will perform sit to stand with modified independence. 4.  Patient will ambulate with modified independence for 50 feet with the least restrictive device. 5.  Patient will ascend/descend 3 stairs with handrail(s) with minimal assistance/contact guard assist.    PLOF: Amb with cane. Lives alone on first floor of two story home. Outcome: Progressing Towards Goal   PHYSICAL THERAPY TREATMENT    Patient: Christopher Frankel (9 y.o. female)  Date: 7/7/2021  Diagnosis: HTN (hypertension), malignant [I10] Hypertensive urgency  Precautions: Fall  ASSESSMENT:  Co-treated with OT to maximize patient safety and participation in functional mobility. Oriented to self; first name only. Poor command following. Decreased interaction; repetitive cues to attend to staff. Unable to lift BLE off bed. Noted to be incontinent of large amount of urine despite bhavin-wick. Max Ax2 for supine to sit. Seated EOB with poor balance; requires constant support. Increased postural sway; unable to correct. Poor coordination and  strength RUE. Unable to complete functional tasks. Max A x2 for sit to supine. /79. Rolling with mod A to right and max A to left to change soiled pads. Once on dry pads, incontinent of urine again. Completed rolling for clean-up. Seated in bed with HOB elevated. Noted to be pocketing food from lunch tray; removed. IVANA Naranjo notified of concerns. Dr. Enmanuel fatima served with concerns.  Patient was min A for 15ft amb to/from bathroom on Monday 7/5; concern for significant change in cognition and functional status. May benefit from further workup including SLP consult and brain MRI. Progression toward goals:   []      Improving appropriately and progressing toward goals  [x]      Improving slowly and progressing toward goals  []      Not making progress toward goals and plan of care will be adjusted     PLAN:  Patient continues to benefit from skilled intervention to address the above impairments. Continue treatment per established plan of care. Discharge Recommendations:  [facility]  Further Equipment Recommendations for Discharge:  TBD with progression     SUBJECTIVE:   Patient stated [place].     OBJECTIVE DATA SUMMARY:   Critical Behavior:  Neurologic State: Lethargic  Orientation Level: Oriented to person  Cognition: Decreased command following     Psychosocial  Patient Behaviors: Lethargic; Not interactive    Functional Mobility:  Bed Mobility:  Rolling: Moderate assistance;Maximum assistance  Supine to Sit: Maximum assistance;Assist x2  Sit to Supine: Maximum assistance;Assist x2  Balance:   Sitting: Impaired  Sitting - Static: Poor (constant support)  Neuro Re-Education:  Seated balance 3 minutes  Therapeutic Exercises:   Rolling x4    Pain:  Pain level pre-treatment: 0/10  Pain level post-treatment: 0/10     Activity Tolerance:   Poor    After treatment:   [] Patient left in no apparent distress sitting up in chair  [x] Patient left in no apparent distress in bed  [x] Call bell left within reach  [x] Nursing notified  [] Caregiver present  [] Bed alarm activated  [] SCDs applied      COMMUNICATION/EDUCATION:   [x]         Role of physical therapy in the acute care setting. [x]         Fall prevention education was provided and the patient/caregiver indicated understanding. [x]         Patient/family have participated as able in working toward goals and plan of care. [x]         Patient/family agree to work toward stated goals and plan of care.   [] Patient understands intent and goals of therapy, but is neutral about his/her participation. []         Patient is unable to participate in stated goals/plan of care: ongoing with therapy staff.       Gregg Busby, JASWANT   Time Calculation: 31 mins

## 2021-07-07 NOTE — PROGRESS NOTES
Per nurse Katherine Magana,  is requesting a UAI to be done before pt is transferred to SNF and for copy of UAI to be faxed to . Will inform  tomorrow.         EVELYN Foy RN  Care Management  Pager: [phone]

## 2021-07-07 NOTE — PROGRESS NOTES
Problem: Pressure Injury - Risk of  Goal: *Prevention of pressure injury  Description: Document John Scale and appropriate interventions in the flowsheet. 7/7/2021 0130 by Gabriel Haynes RN  Outcome: Progressing Towards Goal  Note: Pressure Injury Interventions:  Sensory Interventions: Assess changes in LOC, Maintain/enhance activity level, Minimize linen layers, Pressure redistribution bed/mattress (bed type), Use 30-degree side-lying position    Moisture Interventions: Absorbent underpads, Internal/External urinary devices, Minimize layers    Activity Interventions: Pressure redistribution bed/mattress(bed type), PT/OT evaluation    Mobility Interventions: HOB 30 degrees or less, Pressure redistribution bed/mattress (bed type), Turn and reposition approx. every two hours(pillow and wedges)    Nutrition Interventions: Document food/fluid/supplement intake    Friction and Shear Interventions: HOB 30 degrees or less, Minimize layers             7/7/2021 0129 by Gabriel Haynes RN  Note: Pressure Injury Interventions:  Sensory Interventions: Assess changes in LOC, Maintain/enhance activity level, Minimize linen layers, Pressure redistribution bed/mattress (bed type), Use 30-degree side-lying position    Moisture Interventions: Absorbent underpads, Internal/External urinary devices, Minimize layers    Activity Interventions: Pressure redistribution bed/mattress(bed type), PT/OT evaluation    Mobility Interventions: HOB 30 degrees or less, Pressure redistribution bed/mattress (bed type), Turn and reposition approx.  every two hours(pillow and wedges)    Nutrition Interventions: Document food/fluid/supplement intake    Friction and Shear Interventions: HOB 30 degrees or less, Minimize layers                Problem: Patient Education: Go to Patient Education Activity  Goal: Patient/Family Education  Outcome: Progressing Towards Goal     Problem: Falls - Risk of  Goal: *Absence of Falls  Description: Alem Hayward Fall Risk and appropriate interventions in the flowsheet.   Outcome: Progressing Towards Goal  Note: Fall Risk Interventions:  Mobility Interventions: Bed/chair exit alarm, Communicate number of staff needed for ambulation/transfer    Mentation Interventions: Bed/chair exit alarm, Door open when patient unattended, Reorient patient    Medication Interventions: Bed/chair exit alarm    Elimination Interventions: Bed/chair exit alarm, Call light in reach, Toileting schedule/hourly rounds    History of Falls Interventions: Bed/chair exit alarm, Door open when patient unattended         Problem: Patient Education: Go to Patient Education Activity  Goal: Patient/Family Education  Outcome: Progressing Towards Goal     Problem: Pain  Goal: *Control of Pain  Outcome: Progressing Towards Goal     Problem: Hypertension  Goal: *Blood pressure within specified parameters  Outcome: Progressing Towards Goal  Goal: *Fluid volume balance  Outcome: Progressing Towards Goal  Goal: *Labs within defined limits  Outcome: Progressing Towards Goal     Problem: Patient Education: Go to Patient Education Activity  Goal: Patient/Family Education  Outcome: Progressing Towards Goal

## 2021-07-07 NOTE — PROGRESS NOTES
12  Pt arrived from [facility], extremely difficult to arouse. Did not arouse with verbal, it took sternal rub then she would say \"ouch\" but go back to sleep. Unable to say her name. Needed to pour cold water on her, rub her feet with cold cloth, turn her back and forth in bed, as much stimulation as possible then she kept her eyes open and said her name, speech clear. Repeated \"hospital\" after reoriented x2.  lupe equally weak, unable to lift arms off bed. Able to wiggle toes but not move legs. Noted hospitalist note of earlier today about her lethargy. Still, planning to page MD about her mentation. BS WNL, VSS.    0133 Hospitalist paged. Pt reassessed, seems more alert. Nodded yes to softly spoken question Anali Half you ok? \". Eyes open spontaneously, no BM for several days, when asked if she was constipated, said, \"I dont think so\", abd soft, non-tender with palp, BOSO are very hypoactive. 46 Spoke with MD, relayed above info. Orders recvd. Thank you. 5239 Able to take miralax in apple juice, required max assist in holding cup to mouth. Able to drink following aspiration precautions, still, did cough once after swallowing.

## 2021-07-07 NOTE — PROGRESS NOTES
In Patient Progress note      Admit Date: 7/3/2021          Impression:     1. 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 HTN Urgency   3. HFpEF and diastolic Dysfunction   4. DM      Plan   1. Hold lasix and losartan   2. Gentle hydration with NS @ 75 cc/hrs , recheck in PM  3. follow cardiology recs   4. avoid too tight bp control , decrease losartan to 50 daily and metoprolol to 25 BID   5. Follow labs     Please call with questions    Heidi Raman MD FASN  Cell [phone]  Pager: [phone]       Subjective:     - No acute over night events. - respiratory - stable  - hemodynamics - stable, no pressrs  - UOP- good  - Nutrition -poor    Objective:     Visit Vitals  BP 99/64 (BP 1 Location: Left upper arm)   Pulse 98   Temp 97.7 °F (36.5 °C)   Resp 18   Ht 5' 4\" (1.626 m)   Wt 79.6 kg (175 lb 8 oz)   SpO2 97%   BMI 30.12 kg/m²         Intake/Output Summary (Last 24 hours) at 7/7/2021 1731  Last data filed at 7/7/2021 0725  Gross per 24 hour   Intake 220 ml   Output 500 ml   Net -280 ml       Physical Exam:     General: NAD, alert and oriented. Neck: No jvd. LUNGS: Clear to Auscultation, No rales, rhonchi or wheezes. CVS EXM: S1, S2  RRR, no murmurs/gallops/rubs. Abdomen: soft, non tender. Lower Extremities:  no edema. Data Review:    No results for input(s): WBC, RBC, HCT, MCV, MCH, MCHC, RDW, HCTEXT, HCTEXT in the last 72 hours.     No lab exists for component: HEMOGLOBIN, PLATELET, MPV  Recent Labs     07/07/21  0215 07/06/21  0550 07/05/21  0533   BUN 41* 35* 27*   CREA 2.51* 2.09* 1.69*   CA 8.3* 8.1* 8.6   K 4.3 3.9 4.5   * 134* 137    105 107   CO2 18* 22 23   * 116* 93       Keanu Licona MD

## 2021-07-07 NOTE — PROGRESS NOTES
Spk with patient niece in regards to several appts tht the pt has coming up. One of them was an appt to have a UAI assessment completed with [address]. Spk to Ms Monse Ortiz [phone] ext [phone] with [facility] who is requesting patient to have UAI completed while he's in the hospital.   also requested to have completed UAI faxed to her regardless of the decision. Ms Monse Ortiz gave fax number as [phone]. Per Ms Monse Ortiz attempting to get pt qualified for med funded long term care program and PCA at home. Stated tht even if patient is going to be d/c to SNF she would still need a UAI completed prior to d/c as once she is d/c from SNF services can already be approved. Advised would forward information to .

## 2021-07-07 NOTE — PROGRESS NOTES
Reason for Admission:  HTN (hypertension), malignant [I10]                 RUR Score:    19%            Plan for utilizing home health:    No, not at this time. Likelihood of Readmission:   Moderate                         Do you (patient/family) have any concerns for transition/discharge?  no, not at this time. Transition of Care Plan:       Initial assessment completed with relative(s), patient's nephew, Denny Priest. Cognitive status of patient: disoriented. Face sheet information confirmed:  yes. The patient's nephew Denny Priest [phone] to participate in her discharge plan and to receive any needed information. This patient lives alone in a single family home, with 4 to 5 steps to enter. Patient is able to navigate steps as needed. Prior to hospitalization, patient was considered to be independent with ADLs/IADLS : yes . Patient has a current ACP document on file: no.      Healthcare Decision Maker:   Primary Decision Maker: Felicia Kramer - Other Relative - [phone]    Click here to complete [address] including selection of the Healthcare Decision Maker Relationship (ie \"Primary\")    Patient will need Medical Transport to be available to transport patient to SNF  upon discharge. The patient already has [employer], Lift chair, BSC, and [employer], (Lift Chair needs a fuse), medical equipment available in the home. Patient is not currently active with home health. Patient has not stayed in a skilled nursing facility or rehab. This patient is on dialysis :no. Patient's nephew to review Skilled Nursing Facilities online for SNF choices. Currently, the discharge plan is SNF. The patient states that she can obtain her medications from the pharmacy, and take her medications as directed. Patient's current insurance is  [facility].  Vandana [place]      Care Management Interventions  PCP Verified by CM: Yes  Mode of Transport at Discharge: BLS  Transition of Care Consult (CM Consult): SNF  Discharge Durable Medical Equipment: No  Physical Therapy Consult: Yes  Occupational Therapy Consult: Yes  Speech Therapy Consult: No  Current Support Network: Lives Alone  The Plan for Transition of Care is Related to the Following Treatment Goals : [facility]  The Patient and/or Patient Representative was Provided with a Choice of Provider and Agrees with the Discharge Plan?: Yes  Name of the Patient Representative Who was Provided with a Choice of Provider and Agrees with the Discharge Plan: Vincent Easley ([id number])  [place] of Choice List was Provided with Basic Dialogue that Supports the Patient's Individualized Plan of Care/Goals, Treatment Preferences and Shares the Quality Data Associated with the Providers?: Yes  Discharge Location  Discharge Placement: Skilled nursing facility        Al Villarreal RN  Case Management [phone]

## 2021-07-08 NOTE — PROGRESS NOTES
Problem: Mobility Impaired (Adult and Pediatric)  Goal: *Acute Goals and Plan of Care (Insert Text)  Description: Physical Therapy Goals  Updated 7/8/2021 d/t cognition change  1. Patient will follow 90% of commands to maximize safety and active participation in mobility training. 2.  Patient will move from supine to sit and sit to supine in bed with minimal assistance/contact guard assist.    3.  Patient will maintain seated at edge of bed for 8 min with supervision/set-up to prepare for out of bed activity. 4.  Patient will perform sit to stand with minimal assistance/contact guard assist.  5.  Patient will transfer from bed to chair and chair to bed with minimal assistance/contact guard assist using the least restrictive device. 6.  Patient will ambulate with minimal assistance/contact guard assist for 10 feet with the least restrictive device. Initiated 7/5/2021 and to be accomplished within 7 day(s)  1. Patient will move from supine to sit and sit to supine in bed with modified independence. 2.  Patient will transfer from bed to chair and chair to bed with modified independence using the least restrictive device. 3.  Patient will perform sit to stand with modified independence. 4.  Patient will ambulate with modified independence for 50 feet with the least restrictive device. 5.  Patient will ascend/descend 3 stairs with handrail(s) with minimal assistance/contact guard assist.    PLOF: Amb with cane. Lives alone on first floor of two story home. Outcome: Progressing Towards Goal   PHYSICAL THERAPY RE-EVALUATION    Patient: Angel Figueroa (80 y.o. female)  Date: 7/8/2021  Primary Diagnosis: HTN (hypertension), malignant [I10]  Precautions: Fall  ASSESSMENT :  Re-evaluation s/p change in mental status and functional abilities since evaluation. Goals reviewed and updated as indicated. Oriented to self by first name and date of birth only. Unable to provide accurate age.  Disoriented to time and place. Decreased attention to task and increased time to answer questions. Participates in AROM on RLE for knee flexion and hip abduction with verbal and tactile cues as noted below. No AROM for knee extension on RLE. LLE AROM; reports unable however does initiate knee flexion for AAROM/PROM. Mod A for partial roll to left to position for pressure relief. Heels floated on pillow. Demonstrates appropriate use of call bell for needs. Call bell in reach. Patient will continue to benefit from skilled intervention to address the above impairments. PLAN :  Recommendations and Planned Interventions:  [x]           Bed Mobility Training             [x]    Neuromuscular Re-Education  [x]           Transfer Training                   []    Orthotic/Prosthetic Training  [x]           Gait Training                          []    Modalities  [x]           Therapeutic Exercises           []    Edema Management/Control  [x]           Therapeutic Activities            [x]    Family Training/Education  [x]           Patient Education  []           Other (comment):    Frequency/Duration: Patient will continue to be followed by physical therapy 3-5 times a week  to address goals.   Discharge Recommendations: [facility]  Further Equipment Recommendations for Discharge: TBD with progression      SUBJECTIVE:   Patient stated I'm 85.    OBJECTIVE DATA SUMMARY:     Past Medical History:   Diagnosis Date    Anxiety     Arthritis     Diabetes ([facility] [address]     Edema of extremities     Hematologic disorder     Hepatitis C carrier ([facility] [address]     Hypercholesterolemia     Hypertension     Insomnia     Labyrinthitis     Menopause     Primary osteoarthritis of knees, bilateral     Urge incontinence     Vitamin D deficiency      Past Surgical History:   Procedure Laterality Date    HX HYSTERECTOMY  1980    HX OTHER SURGICAL  2007    Lap Band for weight loss       Critical Behavior:  Neurologic State: Confused  Orientation Level: Oriented to person;Disoriented to time;Disoriented to place  Cognition: Decreased attention/concentration     Psychosocial  Patient Behaviors: Calm    Strength:    Unable to formally assess d/t poor AROM performance  BLE grossly less than 3/5  Range Of Motion:  BLE PROM WFL  Functional Mobility:  Bed Mobility:  Rolling: Moderate assistance  Therapeutic Exercises:   RLE AROM hip/knee flexion, hip abduction x10, PROM knee extension  LLE AAROM hip/knee flexion x10, PROM knee extension, hip abduction x10    Pain:  Pain level pre-treatment: 0/10   Pain level post-treatment: 0/10     Activity Tolerance:   Poor    After treatment:   []         Patient left in no apparent distress sitting up in chair  [x]         Patient left in no apparent distress in bed  [x]         Call bell left within reach  [x]         Nursing notified  []         Caregiver present  []         Bed alarm activated  []         SCDs applied    COMMUNICATION/EDUCATION:   [x]         Role of physical therapy in the acute care setting. [x]         Fall prevention education was provided and the patient/caregiver indicated understanding. [x]         Patient/family have participated as able in goal setting and plan of care. [x]         Patient/family agree to work toward stated goals and plan of care. []         Patient understands intent and goals of therapy, but is neutral about his/her participation. []         Patient is unable to participate in goal setting/plan of care: ongoing with therapy staff.     Thank you for this referral.  Cynthia Stokes, PT   Time Calculation: 11 mins

## 2021-07-08 NOTE — PROGRESS NOTES
Progress Note         Patient: Angel Figueroa MRN: [id number]  CSN: [id number]    YOB: 1934  Age: 80 y.o. Sex: female    DOA: 7/3/2021 LOS:  LOS: 5 days                    Subjective:   Patient independently seen and examined at bedside. Much more awake today. Denies any pain nausea or vomiting. Does not recall being sleepy or drowsy yesterday. Answers questions but still pleasantly confused  Objective:     Physical Exam:  Visit Vitals  /62 (BP 1 Location: Left upper arm, BP Patient Position: Sitting)   Pulse 65   Temp 97.4 °F (36.3 °C)   Resp 18   Ht 5' 4\" (1.626 m)   Wt 78.5 kg (173 lb)   SpO2 96%   BMI 29.70 kg/m²        General:         alert and oriented, no acute distress    HEENT: NC, Atraumatic. Anicteric sclerae. Lungs: CTA Bilaterally. No Wheezing/Rhonchi/Rales. Heart:  Regular  rhythm,  No murmur, No Rubs, No Gallops  Abdomen: Soft, Non distended, Non tender. +Bowel sounds, no HSM  Extremities: No c/c/e  Psych:   Not anxious or agitated. Neurologic:  Awake and alert oriented occasionally confused    Intake and Output:  Current Shift:  07/08 0701 - 07/08 1900  In: 580 [P.O.:580]  Out: -   Last three shifts:  07/06 1901 - 07/08 0700  In: 1428.8 [P.O.:100; I.V.:1328.8]  Out: 500 [Urine:500]    Labs: Results:       Chemistry Recent Labs     07/08/21  0324 07/07/21  1700 07/07/21  0215   * 119* 123*   * 132* 131*   K 4.3 4.4 4.3    103 105   CO2 21 22 18*   BUN 45* 43* 41*   CREA 2.41* 2.47* 2.51*   CA 8.4* 8.6 8.3*   AGAP 9 7 8   BUCR 19 17 16   ALB  --  2.8*  --       CBC w/Diff Recent Labs     07/08/21  0324   WBC 7.8   RBC 4.30   HGB 12.0   HCT 38.1         Cardiac Enzymes No results for input(s): CPK, CKND1, RADHA in the last 72 hours. No lab exists for component: CKRMB, TROIP   Coagulation No results for input(s): PTP, INR, APTT, INREXT, INREXT in the last 72 hours.     Lipid Panel Lab Results   Component Value Date/Time    Cholesterol, total 198 11/01/2012 03:03 PM    HDL Cholesterol 73 (H) 11/01/2012 03:03 PM    LDL, calculated 107 (H) 11/01/2012 03:03 PM    VLDL, calculated 18 11/01/2012 03:03 PM    Triglyceride 90 11/01/2012 03:03 PM    CHOL/HDL Ratio 2.7 11/01/2012 03:03 PM      BNP No results for input(s): BNPP in the last 72 hours. Liver Enzymes Recent Labs     07/07/21  1700   ALB 2.8*      Thyroid Studies Lab Results   Component Value Date/Time    TSH 3.02 07/04/2021 10:30 AM                Assessment and Plan:     Joyce Lerma is a 80 y.o. female with a PMHx of chronic diastolic CHF, chronic BLE edema, HTN, HLD, type II DM and anxiety who is admitted for acute on chronic diastolic CHF and hypertensive urgency. 1. Hypertensive urgencyresolved, off nitroprusside drip  2. Acute on chronic diastolic CHF: EF 97-74%  3. YASMANY on CKD stage III  4. Hypertensive encephalopathy/delirium  5. HTN  6. HLD  7. Type II DMHbA1c 5.9 on 7/4/2021  8. Chronic BLE edema  9. Anxiety  10. Constipation    Discussed with Dr. Yoandy Suarez: Hold losartan and Lasix today due to increased creatinine  cardiology signed off  Continue and decrease home BP meds  metoprolol to 25 mg twice daily  Continue IV hydralazine as needed  Continue SSI with Accu-Cheks  Continue gabapentin, but at decreased dose  Strict I's and O's, daily weight  PT, OTrecommending inpatient rehab  Speech evaluated: Recommend soft diet right sized food with thin liquids      Case discussed with:  [x]Patient  []Family  [x]Nursing  [x]Case Management  DVT prophylaxis: Lovenox  Diet: Cardiac  Contact: Brii Moe (son)    [phone]  Code Status: Full  Disposition:-Awaiting placement. May have bed at [facility] on Monday. Patient has been accepted to [facility] and rehab but unable to be accepted as the patient is unvaccinated [address], DO  7/8/2021       Dragon medical dictation software was used for portions of this report. Unintended errors may occur.

## 2021-07-08 NOTE — PROGRESS NOTES
Problem: Self Care Deficits Care Plan (Adult)  Goal: *Acute Goals and Plan of Care (Insert Text)  Description: Occupational Therapy Goals  Initiated 7/5/2021, Patient with change in status therefore re-evaluated 7/8/2021 all goals modified to be achieved within 7 day(s). 1.  Patient will perform grooming with supervision/ set-up sitting EOB, F balance. 2.  Patient will perform self-feeding with modified independence. 3.  Patient will perform upper body dressing with supervision/ set-up. 4.  Patient will perform toilet transfers with maximal assistance x 1.  5.  Patient will perform all aspects of toileting with moderate assistance. 6.  Patient will participate in upper extremity therapeutic exercise/activities with supervision/ set-upfor 8 minutes. 7.  Patient will utilize energy conservation techniques during functional activities with min verbal cues. 8.  Patient will perform bed mobility in preparation for selfcare with minimal assistance. Prior Level of Function: Mod I with ADLs and functional mobility using SPC     Outcome: Progressing Towards Goal     OCCUPATIONAL THERAPY RE-EVALUATION    Patient: Denia Carreno (69 y.o. female)  Date: 7/8/2021  Primary Diagnosis: HTN (hypertension), malignant [I10]  Precautions: Fall, Skin    ASSESSMENT :  Upon entering the room, the patient was supine in bed with RLE hanging off to the side, yelling \"mister\". Patient educated on the role of OT, re-evaluation process, and safety during this admission with patient verbalizing understanding. Patient unable to lift RLE onto bed independently, maximal assistance needed for task. Patient oriented x 2 this session and presents with intermittent confusion/decreased command following. Patient moderate assist for rolling to R side, max assist for scooting towards HOB and minimal assistance for redonning gown that appeared to have fallen off.  Patient with breakfast tray present and able to bring food to mouth with stand by assistance. Based on the objective data described below, the patient presents with decreased strength, decreased independence, decreased safety awareness, decreased functional balance, and decreased functional mobility, which impedes pt performance in basic self-care/ADL tasks. Patient would benefit from continued skilled OT to restore PLOF and maximize function. Patient will benefit from skilled intervention to address the above impairments. Patient's rehabilitation potential is considered to be Fair  Factors which may influence rehabilitation potential include:   []             None noted  [x]             Mental ability/status  [x]             Medical condition  [x]             Home/family situation and support systems  [x]             Safety awareness  [x]             Pain tolerance/management  []             Other:      PLAN :  Recommendations and Planned Interventions:   [x]               Self Care Training                  [x]      Therapeutic Activities  [x]               Functional Mobility Training   [x]      Cognitive Retraining  [x]               Therapeutic Exercises           [x]      Endurance Activities  [x]               Balance Training                    [x]      Neuromuscular Re-Education  []               Visual/Perceptual Training     [x]      Home Safety Training  [x]               Patient Education                   [x]      Family Training/Education  []               Other (comment):    Frequency/Duration: Patient will be followed by occupational therapy 3 times a week to address goals. Discharge Recommendations: Haim Villar  Further Equipment Recommendations for Discharge: TBD at next level of care     SUBJECTIVE:   Patient stated my left leg is the bad leg    OBJECTIVE DATA SUMMARY:   Hospital course since last seen and reason for reevaluation: Patient has been progressing slowly as a result of receiving skilled OT services.  OT will continue to follow the patient for further intervention during this hospitalization, in order to maximize ADL performance and overall functional independence. Past Medical History:   Diagnosis Date    Anxiety     Arthritis     Diabetes ([facility] [address]     Edema of extremities     Hematologic disorder     Hepatitis C carrier ([facility] [address]     Hypercholesterolemia     Hypertension     Insomnia     Labyrinthitis     Menopause     Primary osteoarthritis of knees, bilateral     Urge incontinence     Vitamin D deficiency      Past Surgical History:   Procedure Laterality Date    HX HYSTERECTOMY  1980    HX OTHER SURGICAL  2007    Lap Band for weight loss     Barriers to Learning/Limitations: yes;  altered mental status (i.e.Sedation, Confusion)  Compensate with: visual, verbal, tactile, kinesthetic cues/model    Home Situation:   Home Situation  Home Environment: Private residence  # Steps to Enter: 4  Rails to Enter: Yes  Hand Rails : Bilateral  One/Two Story Residence: One story  Living Alone: Yes  Support Systems: Friends \ neighbors  Patient Expects to be Discharged to[de-identified] [employer]  Current DME Used/Available at Home: Bennett Bougie, rolling, Cane, straight, Shower chair, Grab bars  Tub or Shower Type: Shower  [x]  Right hand dominant   []  Left hand dominant    Cognitive/Behavioral Status:  Neurologic State: Alert;Confused  Orientation Level: Oriented to person;Oriented to place; Disoriented to time;Disoriented to situation (reports \"[place]\" and \"two thousand twenty something\")  Cognition: Decreased attention/concentration;Decreased command following;Poor safety awareness  Safety/Judgement: Fall prevention    Skin: Intact  Edema: None noted    Vision/Perceptual:    Tracking: Able to track stimulus in all quadrants w/o difficulty      Coordination: BUE  Fine Motor Skills-Upper: Left Intact; Right Intact    Gross Motor Skills-Upper: Left Intact; Right Intact    Strength: BUE    Strength: Generally decreased, functional                Tone & Sensation: BUE    Tone: Normal  Sensation: Intact                      Range of Motion: BUE    AROM: Generally decreased, functional                         Functional Mobility and Transfers for ADLs:  Bed Mobility:  Rolling: Moderate assistance;Assist x1 (to R side)    Scooting: Maximum assistance; Total assistance;Assist x1 (towards [facility]; patient able to raise RUE to rail to assist)  Transfers:      ADL Assessment:   Feeding: Setup    Oral Facial Hygiene/Grooming: Contact guard assistance    Bathing: Maximum assistance    Upper Body Dressing: Minimum assistance    Lower Body Dressing: Maximum assistance    Toileting: Maximum assistance; Total assistance             ADL Intervention:  Feeding  Feeding Assistance: Set-up  Food to Mouth: Set-up    Upper Body Dressing Assistance  Dressing Assistance: [address] [place]: Minimum  assistance      Cognitive Retraining  Safety/Judgement: Fall prevention    Pain:  Pain level pre-treatment: -/10   Pain level post-treatment: -/10, able to state pain in LLE no numerical value given  Pain Intervention(s): Medication (see MAR); Response to intervention: Nurse notified, See doc flow    Activity Tolerance:   Poor      Please refer to the flowsheet for vital signs taken during this treatment. After treatment:   [] Patient left in no apparent distress sitting up in chair  [x] Patient left in no apparent distress in bed  [x] Call bell left within reach  [x] Nursing notified  [] Caregiver present  [x] Bed alarm activated    COMMUNICATION/EDUCATION:   [x] Role of Occupational Therapy in the acute care setting  [x] Home safety education was provided and the patient/caregiver indicated understanding. [x] Patient/family have participated as able in goal setting and plan of care. [x] Patient/family agree to work toward stated goals and plan of care. [] Patient understands intent and goals of therapy, but is neutral about his/her participation.   [] Patient is unable to participate in goal setting and plan of care.     Thank you for this referral.  Christopher Naqvi OTR/L  Time Calculation: 10 mins

## 2021-07-08 NOTE — PROGRESS NOTES
CM called and spoke to [facility] at [facility], they have a potential bed on Monday, but that is pending, and have not reviewed this patient yet for SNF. CM called patient's nephew Veronica Kwon [phone], and received a voicemail, CM left message that 2-3 more SNF choices are needed, updated on previous SNF choices, phone number left for a return call.          Zandra Gaspar, RN  Case Management [phone]

## 2021-07-08 NOTE — PROGRESS NOTES
Problem: Dysphagia (Adult)  Goal: *Acute Goals and Plan of Care (Insert Text)  Description: Patient will:  1. Tolerate PO trials with 0 s/s overt distress in 4/5 trials  2. Utilize compensatory swallow strategies/maneuvers (decrease bite/sip, size/rate, alt. liq/sol) with min cues in 4/5 trials    Recommend:   Soft and bite sized with thin liquids   Meds per pt preference   Aspiration precautions  HOB >45 degrees during all intake and for at least 30 min after po   Small bites/sips, slow rate of intake, alternating bites/sips  Oral care post meals    Outcome: Progressing Towards Goal    SPEECH LANGUAGE PATHOLOGY BEDSIDE SWALLOW EVALUATION/TREATMENT    Patient: Arlyn Winn (70 y.o. female)  Date: 7/8/2021  Primary Diagnosis: HTN (hypertension), malignant [I10]  Precautions: Aspiration, Fall  PLOF: As per H&P    ASSESSMENT :  Based on the objective data described below, the patient presents with moderate oral dysphagia. Pt alert but confused, oriented to person only. Per OT, pt pocketing food yesterday during session. Oral mech exam unremarkable. Pt tolerating thin liquids + straw with timely swallow initiation, adequate laryngeal elevation to palpation and no overt s/sx aspiration. Demo moderate pocketing in L buccal cavity of regular solid items from breakfast tray. Demo improved tolerance of mech soft items, able to clear with no residue post intake. Recommend change diet to soft and bite sized with thin liquids with use of the above mentioned compensatory strategies. TREATMENT :  Skilled therapy initiated; pt able to clear moderate lingual spread on 1/4 presentations of regular solids given max multi-modal cues for use of liquid wash. Will follow x1-2 visits to ensure continued diet tolerance as suspect dysphagia most closely related to confusion. Patient will benefit from skilled intervention to address the above impairments.   Patient's rehabilitation potential is considered to be Fair  Factors which may influence rehabilitation potential include:   []            None noted  [x]            Mental ability/status  [x]            Medical condition  []            Home/family situation and support systems  [x]            Safety awareness  []            Pain tolerance/management  []            Other:      PLAN :  Recommendations and Planned Interventions:  As above   Frequency/Duration: Patient will be followed by speech-language pathology x1-2 visits to address goals. Discharge Recommendations: To Be Determined     SUBJECTIVE:   Patient stated Can I have some of that orange juice?     OBJECTIVE:     Past Medical History:   Diagnosis Date    Anxiety     Arthritis     Diabetes ([facility] [address]     Edema of extremities     Hematologic disorder     Hepatitis C carrier ([facility] [address]     Hypercholesterolemia     Hypertension     Insomnia     Labyrinthitis     Menopause     Primary osteoarthritis of knees, bilateral     Urge incontinence     Vitamin D deficiency      Past Surgical History:   Procedure Laterality Date    HX HYSTERECTOMY  1980    HX OTHER SURGICAL  2007    Lap Band for weight loss     Prior Level of Function/Home Situation:   Home Situation  Home Environment: Private residence  # Steps to Enter: 4  Rails to Enter: Yes  Hand Rails : Bilateral  One/Two Story Residence: One story  Living Alone: Yes  Support Systems: Friends \ neighbors  Patient Expects to be Discharged to[de-identified] [employer]  Current DME Used/Available at Home: Ada Clamp, rolling, Cane, straight, Shower chair, Grab bars  Tub or Shower Type: Shower  Diet prior to admission: unknown   Current Diet:  regular/thin liquids; recommend change to soft/bite sized with thin liquids   Cognitive and Communication Status:  Neurologic State: Confused, Lethargic  Orientation Level: Oriented to person  Cognition: Decreased command following, Decreased attention/concentration, Impaired decision making  Perception: Appears intact  Perseveration: No perseveration noted  Safety/Judgement: Awareness of environment  Oral Assessment:  Oral Assessment  Labial: No impairment  Dentition: Natural;Intact  Oral Hygiene: Good  Lingual: No impairment  Velum: No impairment  Mandible: No impairment  P.O. Trials:  Patient Position: [facility]  Vocal quality prior to P.O.: Low volume  Consistency Presented: Solid; Thin liquid;Mechanical soft  How Presented: Self-fed/presented;Cup/sip; Successive swallows;Straw  Bolus Acceptance: No impairment  Bolus Formation/Control: Impaired  Type of Impairment: Mastication;Delayed;Poor;Posterior; Incomplete  Propulsion: Delayed (# of seconds)  Oral Residue: Pocketing;Right;10-50% of bolus  Initiation of Swallow: No impairment  Laryngeal Elevation: Functional  Aspiration Signs/Symptoms: None  Pharyngeal Phase Characteristics: No impairment, issues, or problems   Effective Modifications: Small sips and bites; Alternate liquids/solids  Cues for Modifications: Maximal  Oral Phase Severity: Moderate  Pharyngeal Phase Severity : No impairment    PAIN:  Start of Eval: 0  End of Eval: 0     After treatment:   []            Patient left in no apparent distress sitting up in chair  [x]            Patient left in no apparent distress in bed  [x]            Call bell left within reach  [x]            Nursing notified  []            Family present  []            Caregiver present  []            Bed alarm activated    COMMUNICATION/EDUCATION:   [x]            Aspiration precautions; swallow safety; compensatory techniques. []            Patient/family have participated as able in goal setting and plan of care. []            Patient/family agree to work toward stated goals and plan of care. []            Patient understands intent and goals of therapy; neutral about participation. [x]            Patient unable to participate in goal setting/plan of care; educ ongoing with interdisciplinary staff  [x]         Posted safety precautions in patient's room.     Thank you for this referral,  Laura Ricardo M.S., [id number] [facility]  Speech-Language Pathologist

## 2021-07-08 NOTE — PROGRESS NOTES
Patient's nephew Tho Guillory [phone] called CM, FOC verbal consent given for SNF, [facility] and [facility]. CM uploaded patient with clinicals to [facility], and sent booking request to [facility]     UAI/LTSS was Successfully Processed on 05/24/2021 at [facility].       Leigh Ann Valiente, RN  Case Management [phone]

## 2021-07-08 NOTE — PROGRESS NOTES
Problem: Pressure Injury - Risk of  Goal: *Prevention of pressure injury  Description: Document John Scale and appropriate interventions in the flowsheet. Outcome: Progressing Towards Goal  Note: Pressure Injury Interventions:  Sensory Interventions: Assess need for specialty bed    Moisture Interventions: Absorbent underpads    Activity Interventions: Pressure redistribution bed/mattress(bed type)    Mobility Interventions: HOB 30 degrees or less    Nutrition Interventions: Document food/fluid/supplement intake    Friction and Shear Interventions: HOB 30 degrees or less                Problem: Patient Education: Go to Patient Education Activity  Goal: Patient/Family Education  Outcome: Progressing Towards Goal     Problem: Falls - Risk of  Goal: *Absence of Falls  Description: Document Oswaldo Fall Risk and appropriate interventions in the flowsheet.   Outcome: Progressing Towards Goal  Note: Fall Risk Interventions:  Mobility Interventions: Bed/chair exit alarm    Mentation Interventions: Bed/chair exit alarm    Medication Interventions: Bed/chair exit alarm    Elimination Interventions: Bed/chair exit alarm    History of Falls Interventions: Bed/chair exit alarm         Problem: Patient Education: Go to Patient Education Activity  Goal: Patient/Family Education  Outcome: Progressing Towards Goal     Problem: Patient Education: Go to Patient Education Activity  Goal: Patient/Family Education  Outcome: Progressing Towards Goal     Problem: Patient Education: Go to Patient Education Activity  Goal: Patient/Family Education  Outcome: Progressing Towards Goal     Problem: Pain  Goal: *Control of Pain  Outcome: Progressing Towards Goal     Problem: Patient Education: Go to Patient Education Activity  Goal: Patient/Family Education  Outcome: Progressing Towards Goal     Problem: Hypertension  Goal: *Blood pressure within specified parameters  Outcome: Progressing Towards Goal  Goal: *Fluid volume balance  Outcome: Progressing Towards Goal  Goal: *Labs within defined limits  Outcome: Progressing Towards Goal     Problem: Patient Education: Go to Patient Education Activity  Goal: Patient/Family Education  Outcome: Progressing Towards Goal

## 2021-07-08 NOTE — PROGRESS NOTES
CM asked Dev Gallo and Emily Lynn to assist with getting a UAI/LTSS that was Successfully Processed at the [facility] on 05/24/2021.            Man Sharma RN  Case Management [phone]

## 2021-07-08 NOTE — PROGRESS NOTES
Problem: Pressure Injury - Risk of  Goal: *Prevention of pressure injury  Description: Document John Scale and appropriate interventions in the flowsheet. Outcome: Progressing Towards Goal  Note: Pressure Injury Interventions:  Sensory Interventions: Assess changes in LOC    Moisture Interventions: Absorbent underpads, Check for incontinence Q2 hours and as needed, Limit adult briefs    Activity Interventions: Increase time out of bed, Pressure redistribution bed/mattress(bed type), PT/OT evaluation    Mobility Interventions: Pressure redistribution bed/mattress (bed type), PT/OT evaluation    Nutrition Interventions: Document food/fluid/supplement intake    Friction and Shear Interventions: Foam dressings/transparent film/skin sealants                Problem: Patient Education: Go to Patient Education Activity  Goal: Patient/Family Education  Outcome: Progressing Towards Goal     Problem: Falls - Risk of  Goal: *Absence of Falls  Description: Document Oswaldo Fall Risk and appropriate interventions in the flowsheet.   Outcome: Progressing Towards Goal  Note: Fall Risk Interventions:  Mobility Interventions: Bed/chair exit alarm, Utilize walker, cane, or other assistive device, PT Consult for mobility concerns    Mentation Interventions: Adequate sleep, hydration, pain control, Bed/chair exit alarm, Door open when patient unattended, Room close to nurse's station    Medication Interventions: Bed/chair exit alarm, Patient to call before getting OOB, Teach patient to arise slowly    Elimination Interventions: Bed/chair exit alarm, Call light in reach, Patient to call for help with toileting needs    History of Falls Interventions: Bed/chair exit alarm, Door open when patient unattended, Room close to nurse's station         Problem: Patient Education: Go to Patient Education Activity  Goal: Patient/Family Education  Outcome: Progressing Towards Goal     Problem: Patient Education: Go to Patient Education Activity  Goal: Patient/Family Education  Outcome: Progressing Towards Goal     Problem: Patient Education: Go to Patient Education Activity  Goal: Patient/Family Education  Outcome: Progressing Towards Goal     Problem: Pain  Goal: *Control of Pain  Outcome: Progressing Towards Goal     Problem: Patient Education: Go to Patient Education Activity  Goal: Patient/Family Education  Outcome: Progressing Towards Goal     Problem: Hypertension  Goal: *Blood pressure within specified parameters  Outcome: Progressing Towards Goal  Goal: *Fluid volume balance  Outcome: Progressing Towards Goal  Goal: *Labs within defined limits  Outcome: Progressing Towards Goal     Problem: Patient Education: Go to Patient Education Activity  Goal: Patient/Family Education  Outcome: Progressing Towards Goal     Problem: Patient Education: Go to Patient Education Activity  Goal: Patient/Family Education  Outcome: Progressing Towards Goal

## 2021-07-08 NOTE — PROGRESS NOTES
Nury Chiu with SNF [facility] and Rehab accepted patient in [place]. CM called and spoke with Nury Chiu, she asked if patient was vaccinated, told her no, she said they do not have any more unvaccinated rooms and cannot accept patient for SNF.           Tan Araujo, RN  Case Management [phone]

## 2021-07-09 NOTE — PROGRESS NOTES
[facility] accepted patient in [place], [address] opened for SNF review.            Teddy Raphael, RN  Case Management [phone]

## 2021-07-09 NOTE — PROGRESS NOTES
Mag from [place] [address] called CM. Zechariah Saravia said looking at notes, patient would not qualify for SNF, said [address] is more appropriate for patient. Patient's nephew Radha Lion called CM, CM updated him on conversation with Zechariah Saravia from [place] [address]. Mr. Zeb Dawn said they have started the Medicaid process, he is agreeable for Long Term Care for patient. CM explained the SNF are where patient would be for LTC also. CM explained that will try to keep in this area, but if no acceptances will need to put out to entire state. [address] for SNF now for LTC are [facility], [facility], and [facility]. Mr. Zeb Dawn said he would try to find out where the Medicaid process is. Patient's nephew  Radha Lion called CM back, said they are working with [facility], contact's are [facility], Mrs. Cliff Rubio [phone] # [phone], and Mrs. Yovani Saunders [phone]  #[phone]. Mr. Zeb Dawn asked that patient received Covid-19 vaccine per his request.      Jeff Amado Ramp with update, and Covid-19 Vaccine request for patient.            Juan Jose Asher, RN  Case Management [phone]

## 2021-07-09 NOTE — PROGRESS NOTES
Comprehensive Nutrition Assessment    Type and Reason for Visit: Initial, RD nutrition re-screen/LOS    Nutrition Recommendations/Plan:   -Continue Soft and Bite sized diet/Low Na w/ thin liquids as tolerated and encourage PO intake >/=75% of meals  -Will add Ensure supplement BID w/ meal for extra kcal and protein needs   -Continue to monitor and record PO intakes, adequate hydration, wt trends, and BMs  -Continue BM medicine to promote good GI motility and stool consistency    Nutrition Assessment:  80 yr old FEMALE admitted d/t hypertensive urgency. SLP following pt for dysphagia treatment and rec soft and bite sized diet w/ thin liquids. Pt tolerating between 26-50% of meals per chart review and per nurse pt ate a majority of pt breakfast this morning. Pt states pain in jaw is affecting PO intake for meals. Pt interested in Ensure supplement for easily consumed kcal and protein. Malnutrition Assessment:  Malnutrition Status:  No malnutrition      Nutrition History and Allergies: PMHx: DM type 2, HTN, Vit D deficiency, chronic LE edema who is presenting to the ED with complaints of worsening SOB over the last 2-3 weeks. She states that she has been getting progressively weak and has had several falls at home in the recent past. NKFA. Estimated Daily Nutrient Needs:  Energy (kcal): 8758-6539 (1.2-1.3); Weight Used for Energy Requirements: Current  Protein (g): 79-94 (1-1.2); Weight Used for Protein Requirements: Current  Fluid (ml/day): 2453-2769; Method Used for Fluid Requirements: 1 ml/kcal      Nutrition Related Findings:  Miralax; Last BM 7/9/21; Glucose past 24 hours between 113-164 mg/dL; last HgbA1c 5.9%      Wounds:    None       Current Nutrition Therapies:  ADULT DIET Dysphagia - Soft & Bite Sized;  Low Sodium (2 gm)    Anthropometric Measures:  · Height:  5' 4\" (162.6 cm)  · Current Body Wt:  78.5 kg (173 lb)   · Admission Body Wt:  189 lb    · Ideal Body Wt:  120 lbs:  144.2 %   · Adjusted Body Weight:   ; Weight Adjustment for: No adjustment   · BMI Category: Overweight (BMI 25.0-29. 9)       Nutrition Diagnosis:   · Inadequate protein-energy intake related to biting/chewing (masticatory) difficulty as evidenced by intake 26-50%      Nutrition Interventions:   Food and/or Nutrient Delivery: Continue current diet, Start oral nutrition supplement  Nutrition Education and Counseling: No recommendations at this time  Coordination of Nutrition Care: Continue to monitor while inpatient    Goals:  PO nutrition intake will meet >75% of patient estimated nutritional needs within the next 7 days.        Nutrition Monitoring and Evaluation:   Behavioral-Environmental Outcomes: None identified  Food/Nutrient Intake Outcomes: Food and nutrient intake, Supplement intake  Physical Signs/Symptoms Outcomes: None identified    Discharge Planning:    Continue current diet     Electronically signed by Fabiola Shrestha RD on 7/9/2021 at [address] [place] [address] PM    Contact: [phone]

## 2021-07-09 NOTE — PROGRESS NOTES
CM uploaded patient in [facility] with clinicals and sent LTC request to [facility]. CM uploaded clinicals in [place] and reopened request to [facility], Yarelis Hernandez to let her know request is for LTC now. CM messaged [facility] and let them know request is now for LTC.           Zandra Gasapr, RN  Case Management [phone]

## 2021-07-09 NOTE — PROGRESS NOTES
Justice Bañuelos CM Specialist received UAI/LTSS for patient from [facility] and uploaded in patient's chart in [address] [place] [address] Road. CM uploaded UAI/LTSS Successfully Processed on 05/24/2021 from [facility] to [place] for LTC.          Alexandr Dominguez, RN  Case Management [phone]

## 2021-07-09 NOTE — PROGRESS NOTES
In Patient Progress note      Admit Date: 7/3/2021          Impression:     1. 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 HTN Urgency   3. HFpEF and diastolic Dysfunction   4. DM     Creatinine now at baseline      Plan   1. Continue to hold  lasix and losartan   2. D/c IVF and encourage po intake   3. follow cardiology recs   4. avoid too tight bp control  5. Follow labs     Please call with questions,    Pb Oneal MD [facility]  Cell [phone]  Pager: [phone]       Subjective:     - No acute over night events. - respiratory - stable  - hemodynamics - stable, no pressrs  - UOP- good  - Nutrition -poor    Objective:     Visit Vitals  BP (!) 158/80 (BP 1 Location: Left upper arm, BP Patient Position: At rest)   Pulse 72   Temp 97.7 °F (36.5 °C)   Resp 20   Ht 5' 4\" (1.626 m)   Wt 78.5 kg (173 lb)   SpO2 98%   BMI 29.70 kg/m²         Intake/Output Summary (Last 24 hours) at 7/9/2021 1225  Last data filed at 7/9/2021 1118  Gross per 24 hour   Intake 720 ml   Output 1525 ml   Net -805 ml       Physical Exam:     General: NAD, alert and oriented. Neck: No jvd. LUNGS: Clear to Auscultation, No rales, rhonchi or wheezes. CVS EXM: S1, S2  RRR, no murmurs/gallops/rubs. Abdomen: soft, non tender. Lower Extremities:  no edema.      Data Review:    Recent Labs     07/08/21  0324   WBC 7.8   RBC 4.30   HCT 38.1   MCV 88.6   MCH 27.9   MCHC 31.5   RDW 14.8*     Recent Labs     07/09/21  0553 07/08/21  0324 07/07/21  1700 07/07/21  0215   BUN 36* 45* 43* 41*   CREA 1.79* 2.41* 2.47* 2.51*   CA 8.6 8.4* 8.6 8.3*   ALB  --   --  2.8*  --    K 4.5 4.3 4.4 4.3   * 135* 132* 131*    105 103 105   CO2 21 21 22 18*   PHOS  --   --  5.1*  --    * 102* 119* 123*       Alessia England MD

## 2021-07-09 NOTE — PROGRESS NOTES
Progress Note         Patient: Lyndsey Nagy MRN: [id number]  CSN: [id number]    YOB: 1934  Age: 80 y.o. Sex: female    DOA: 7/3/2021 LOS:  LOS: 6 days                    Subjective:   Patient independently seen and examined at bedside. Still alert and interactive. Denies any pain nausea or vomiting. No particular complaints today. Objective:     Physical Exam:  Visit Vitals  BP (!) 158/80 (BP 1 Location: Left upper arm, BP Patient Position: At rest)   Pulse 72   Temp 97.7 °F (36.5 °C)   Resp 20   Ht 5' 4\" (1.626 m)   Wt 78.5 kg (173 lb)   SpO2 98%   BMI 29.70 kg/m²        General:         alert and oriented, no acute distress    HEENT: NC, Atraumatic. Anicteric sclerae. Lungs: CTA Bilaterally. No Wheezing/Rhonchi/Rales. Heart:  Regular  rhythm,  No murmur, No Rubs, No Gallops  Abdomen: Soft, Non distended, Non tender. +Bowel sounds, no HSM  Extremities: No c/c/e  Psych:   Not anxious or agitated. Neurologic:  Awake and alert oriented occasionally confused    Intake and Output:  Current Shift:  07/09 0701 - 07/09 1900  In: 600 [P.O.:600]  Out: 225 [Urine:225]  Last three shifts:  07/07 1901 - 07/09 0700  In: 2028.8 [P.O.:700; I.V.:1328.8]  Out: 1500 [Urine:1500]    Labs: Results:       Chemistry Recent Labs     07/09/21  0553 07/08/21  0324 07/07/21  1700   * 102* 119*   * 135* 132*   K 4.5 4.3 4.4    105 103   CO2 21 21 22   BUN 36* 45* 43*   CREA 1.79* 2.41* 2.47*   CA 8.6 8.4* 8.6   AGAP 6 9 7   BUCR 20 19 17   ALB  --   --  2.8*      CBC w/Diff Recent Labs     07/08/21  0324   WBC 7.8   RBC 4.30   HGB 12.0   HCT 38.1         Cardiac Enzymes No results for input(s): CPK, CKND1, RADHA in the last 72 hours. No lab exists for component: CKRMB, TROIP   Coagulation No results for input(s): PTP, INR, APTT, INREXT, INREXT in the last 72 hours.     Lipid Panel Lab Results   Component Value Date/Time    Cholesterol, total 198 11/01/2012 03:03 PM    HDL Cholesterol 73 (H) 11/01/2012 03:03 PM    LDL, calculated 107 (H) 11/01/2012 03:03 PM    VLDL, calculated 18 11/01/2012 03:03 PM    Triglyceride 90 11/01/2012 03:03 PM    CHOL/HDL Ratio 2.7 11/01/2012 03:03 PM      BNP No results for input(s): BNPP in the last 72 hours. Liver Enzymes Recent Labs     07/07/21  1700   ALB 2.8*      Thyroid Studies Lab Results   Component Value Date/Time    TSH 3.02 07/04/2021 10:30 AM                Assessment and Plan:     Candi Stanley is a 80 y.o. female with a PMHx of chronic diastolic CHF, chronic BLE edema, HTN, HLD, type II DM and anxiety who is admitted for acute on chronic diastolic CHF and hypertensive urgency. 1. Hypertensive urgencyresolved, off nitroprusside drip  2. Acute on chronic diastolic CHF: EF 09-95%  3. YASMANY on CKD stage III  4. Hypertensive encephalopathy/delirium  5. HTN  6. HLD  7. Type II DMHbA1c 5.9 on 7/4/2021  8. Chronic BLE edema  9. Anxiety  10. Constipation    Discussed with Dr. Emily Choe:  losartan and Lasix to remain on . Plan to d/c IVF today  cardiology signed off  Continue metoprolol to 25 mg twice daily  Continue IV hydralazine as needed  Continue SSI with Accu-Cheks  Continue gabapentin, but at decreased dose  Strict I's and O's, daily weight  PT, OTrecommending inpatient rehab  Speech evaluated: Recommend soft diet right sized food with thin liquids      Case discussed with:  [x]Patient  []Family  [x]Nursing  [x]Case Management  DVT prophylaxis: Lovenox  Diet: Cardiac  Contact: Maria Dolores Fine (son)    [phone]  Code Status: Full  Disposition:-Awaiting placement. No auth from [facility] for SNF and will need LTC facility. Medicaid application pending. Claire Ferguson DO   7/9/2021       Dragon medical dictation software was used for portions of this report. Unintended errors may occur.

## 2021-07-10 NOTE — PROGRESS NOTES
Received report from [place], [address] [place] [address] with patient lying in bed with eyes closed. Alert to person and place. Disoriented to situation and time. Denies pain or discomfort at present. Bed locked in lowest position. Call light within reach, monitor for assistance and needs. 07/10/21    0740 Bedside and Verbal shift change report given to Elida Gross RN (oncoming nurse) by Tito Simmons RN (offgoing nurse). Report given with SBAR, Kardex, Intake/Output, MAR and Recent Results.

## 2021-07-10 NOTE — ROUTINE PROCESS
Bedside shift change report given to [facility] Cora (oncoming nurse) by Ness Flaherty RN (offgoing nurse). Report included the following information Intake/Output, MAR and Recent Results.

## 2021-07-10 NOTE — PROGRESS NOTES
[facility]  [facility]    Patient: Deo Meyers Age: 80 y.o. : 1934 MR#: [id number] SSN: [id number]  Date: 7/10/2021     Subjective/24-hour events:     Reports feeling significantly better overall. No headache or dizziness/lightheadedness. Denies abdominal pain nausea/vomiting. Family present at bedside    Assessment:   Hypertensive urgency, resolved  Hypertensive encephalopathy, improved  YASMANY on CKD 3  Hypertension  DM2, A1c 5.9%  Hyperlipidemia  Anxiety     Plan:   IVF discontinued yesterday, renal indices have continued to improve. We will follow-up again in AM.  PT/OT, mobilize as tolerated. SNF being recommended at discharge - already has accepting facility. Will follow up with CM with regard to disposition. Await notification that arrangements are in place. Case discussed with:  [x]Patient  [x]Family  [x]Nursing  []Case Management  DVT Prophylaxis:  []Lovenox  []Hep SQ  []SCDs  []Coumadin   []On Heparin gtt    Objective:   VS:   Visit Vitals  BP (!) 161/98   Pulse 88   Temp 97 °F (36.1 °C)   Resp 18   Ht 5' 4\" (1.626 m)   Wt 78.5 kg (173 lb)   SpO2 96%   BMI 29.70 kg/m²      Tmax/24hrs: Temp (24hrs), Av.7 °F (36.5 °C), Min:97 °F (36.1 °C), Max:98 °F (36.7 °C)      Intake/Output Summary (Last 24 hours) at 7/10/2021 0903  Last data filed at 7/10/2021 0738  Gross per 24 hour   Intake 660 ml   Output 1550 ml   Net -890 ml       General:  In NAD. Nontoxic-appearing. Cardiovascular:  RRR. Pulmonary:  Lungs clear bilaterally, no wheezes. GI:  Abdomen soft, NTTP. Extremities:  Warm, no edema or ischemia. Neuro:  Awake, alert and appropriate. Moves extremities spontaneously.     Labs:    Recent Results (from the past 24 hour(s))   GLUCOSE, POC    Collection Time: 21 11:22 AM   Result Value Ref Range    Glucose (POC) 164 (H) 70 - 110 mg/dL   GLUCOSE, POC    Collection Time: 21  6:47 PM   Result Value Ref Range    Glucose (POC) 113 (H) 70 - 110 mg/dL   GLUCOSE, POC    Collection Time: 07/10/21 12:39 AM   Result Value Ref Range    Glucose (POC) 90 70 - 110 mg/dL       Signed By: Kal Merritt MD     July 10, 2021

## 2021-07-10 NOTE — PROGRESS NOTES
Problem: Pressure Injury - Risk of  Goal: *Prevention of pressure injury  Description: Document John Scale and appropriate interventions in the flowsheet. Outcome: Progressing Towards Goal  Note: Pressure Injury Interventions:  Sensory Interventions: Assess changes in LOC, Float heels, Keep linens dry and wrinkle-free    Moisture Interventions: Internal/External urinary devices    Activity Interventions: Pressure redistribution bed/mattress(bed type)    Mobility Interventions: HOB 30 degrees or less, Pressure redistribution bed/mattress (bed type)    Nutrition Interventions: Document food/fluid/supplement intake, Offer support with meals,snacks and hydration    Friction and Shear Interventions: Minimize layers                Problem: Falls - Risk of  Goal: *Absence of Falls  Description: Document Oswaldo Fall Risk and appropriate interventions in the flowsheet.   Outcome: Progressing Towards Goal  Note: Fall Risk Interventions:  Mobility Interventions: Bed/chair exit alarm    Mentation Interventions: Bed/chair exit alarm    Medication Interventions: Bed/chair exit alarm    Elimination Interventions: Bed/chair exit alarm    History of Falls Interventions: Bed/chair exit alarm         Problem: Pain  Goal: *Control of Pain  Outcome: Progressing Towards Goal     Problem: Hypertension  Goal: *Blood pressure within specified parameters  Outcome: Progressing Towards Goal

## 2021-07-10 NOTE — PROGRESS NOTES
RENAL PROGRESS NOTE        Joyce Self         Assessment/Plan:     · YASMANY (due to adhfpef/htn emergency). Improved, scr is close to baseline. · CKD 3B due to presumably htn. · HFpEF. Appears compensated at this time. Restart lasix at the time of d/c at the dose 20 mg twice a day. · HTN. Better but still suboptimally controlled. Restart losartan. · Will f/u on Monday if still in the hospital. If d/ce f/u with pcp/card. Please call if any questions. Subjective:  Patient complaints off: feels better. No SOB/CP/N/V. Lies flat. Good appetite.        Patient Active Problem List   Diagnosis Code    Urge incontinence N39.41    Acute labyrinthitis H83.09    Severe obesity ([facility] [address] E66.01    HTN (hypertension), malignant I10    Hypertensive urgency I16.0       Current Facility-Administered Medications   Medication Dose Route Frequency Provider Last Rate Last Admin    gabapentin (NEURONTIN) capsule 300 mg  300 mg Oral BID Yudy Public, DO   300 mg at 07/10/21 2174    [Held by provider] losartan (COZAAR) tablet 50 mg  50 mg Oral DAILY Lucho Aceves MD        metoprolol tartrate (LOPRESSOR) tablet 25 mg  25 mg Oral BID Maria D Penn MD   25 mg at 07/10/21 3831    [Held by provider] furosemide (LASIX) tablet 20 mg  20 mg Oral DAILY Lucho Aceves MD        mineral oil (FLEET) enema   Rectal PRN Yudy Public, DO   133 mL at 07/06/21 1022    docusate sodium (COLACE) capsule 100 mg  100 mg Oral BID Ashanti Myers MD   100 mg at 07/10/21 7751    ergocalciferol capsule 50,000 Units  50,000 Units Oral every Sunday Ashanti Myers MD   50,000 Units at 07/04/21 0810    polyethylene glycol (MIRALAX) packet 17 g  17 g Oral DAILY Ashanti Myers MD   17 g at 07/10/21 8130    [Held by provider] trospium (SANCTURA) tablet 20 mg  20 mg Oral DAILY Ashanti Myers MD   20 mg at 07/06/21 1009    sodium chloride (NS) flush 5-40 mL  5-40 mL IntraVENous Q8H Nav Olson MD   10 mL at 07/10/21 1541    sodium chloride (NS) flush 5-40 mL  5-40 mL IntraVENous PRN Nav Olson MD        acetaminophen (TYLENOL) tablet 650 mg  650 mg Oral Q6H PRN Nav Olson MD   650 mg at 07/09/21 2036    Or    acetaminophen (TYLENOL) suppository 650 mg  650 mg Rectal Q6H PRN Nav Olson MD        polyethylene glycol [facility]) packet 17 g  17 g Oral DAILY PRN Nav Olson MD   17 g at 07/05/21 0634    ondansetron (ZOFRAN ODT) tablet 4 mg  4 mg Oral Q8H PRN Nav Olson MD        Or    ondansetron [facility]) injection 4 mg  4 mg IntraVENous Q6H PRN Nav Olson MD        enoxaparin (LOVENOX) injection 30 mg  30 mg SubCUTAneous DAILY Nav Olson MD   30 mg at 07/10/21 6638    insulin lispro (HUMALOG) injection   SubCUTAneous AC&HS Nav Olson MD   2 Units at 07/10/21 1305    glucose chewable tablet 16 g  4 Tablet Oral PRN Nav Olson MD        glucagon [facility]) injection 1 mg  1 mg IntraMUSCular PRN Nav Olson MD        dextrose (D50W) injection syrg 12.5-25 g  25-50 mL IntraVENous PRN Nav Olson MD        hydrALAZINE (APRESOLINE) 20 mg/mL injection 20 mg  20 mg IntraVENous Q6H PRN Marze Chick, DO   20 mg at 07/04/21 1626       Objective  Vitals:    07/10/21 0430 07/10/21 0800 07/10/21 1139 07/10/21 1643   BP: (!) 150/90 (!) 161/98 (!) 148/84 120/70   Pulse: 76 88 82 82   Resp: 18 18 16 18   Temp: 98 °F (36.7 °C) 97 °F (36.1 °C) 98.5 °F (36.9 °C) 98.5 °F (36.9 °C)   SpO2: 98% 96% 96% 94%   Weight:       Height:             Intake/Output Summary (Last 24 hours) at 7/10/2021 1703  Last data filed at 7/10/2021 0738  Gross per 24 hour   Intake 60 ml   Output 1550 ml   Net -1490 ml           Admission weight: Weight: 85.7 kg (189 lb) (07/03/21 1016)  Last Weight Metrics:  Weight Loss Metrics 7/7/2021 6/29/2021 6/25/2021 2/11/2021 3/6/2020 2/6/2020 12/11/2017   Today's Wt 173 lb 189 lb 189 lb 195 lb 214 lb 12.8 oz 212 lb 9.6 oz 223 lb 6.4 oz   BMI 29.7 kg/m2 32.44 kg/m2 32.44 kg/m2 33.47 kg/m2 36.87 kg/m2 36.49 kg/m2 38.35 kg/m2             Physical Assessment:     General: NAD, alert and oriented. Neck: No jvd. LUNGS: Clear to Auscultation, No rales, rhonchi or wheezes. CVS EXM: S1, S2  RRR, no murmurs/gallops/rubs. Abdomen: soft, non tender. Lower Extremities:  trace edema. Lab    CBC w/Diff Recent Labs     07/08/21  0324   WBC 7.8   RBC 4.30   HGB 12.0   HCT 38.1           Chemistry Recent Labs     07/09/21  0553 07/08/21  0324   * 102*   * 135*   K 4.5 4.3    105   CO2 21 21   BUN 36* 45*   CREA 1.79* 2.41*   CA 8.6 8.4*   AGAP 6 9   BUCR 20 19         No results found for: IRON, FE, TIBC, IBCT, PSAT, FERR   Lab Results   Component Value Date/Time    Calcium 8.6 07/09/2021 05:53 AM    Phosphorus 5.1 (H) 07/07/2021 05:00 PM        Suki Clarke M.D.   [facility]  Phone

## 2021-07-11 NOTE — PROGRESS NOTES
Problem: Pressure Injury - Risk of  Goal: *Prevention of pressure injury  Description: Document John Scale and appropriate interventions in the flowsheet. Outcome: Progressing Towards Goal  Note: Pressure Injury Interventions:  Sensory Interventions: Assess changes in LOC, Check visual cues for pain, Float heels, Keep linens dry and wrinkle-free, Minimize linen layers, Pressure redistribution bed/mattress (bed type), Turn and reposition approx. every two hours (pillows and wedges if needed)    Moisture Interventions: Absorbent underpads, Check for incontinence Q2 hours and as needed, Internal/External urinary devices, Minimize layers, Moisture barrier, Offer toileting Q_hr    Activity Interventions: Increase time out of bed, Pressure redistribution bed/mattress(bed type)    Mobility Interventions: Float heels, HOB 30 degrees or less, Pressure redistribution bed/mattress (bed type), Turn and reposition approx. every two hours(pillow and wedges)    Nutrition Interventions: Document food/fluid/supplement intake    Friction and Shear Interventions: HOB 30 degrees or less, Lift sheet, Minimize layers                Problem: Patient Education: Go to Patient Education Activity  Goal: Patient/Family Education  Outcome: Progressing Towards Goal     Problem: Falls - Risk of  Goal: *Absence of Falls  Description: Document Oswaldo Fall Risk and appropriate interventions in the flowsheet.   Outcome: Progressing Towards Goal  Note: Fall Risk Interventions:  Mobility Interventions: Bed/chair exit alarm, Patient to call before getting OOB, Utilize walker, cane, or other assistive device    Mentation Interventions: Adequate sleep, hydration, pain control, Bed/chair exit alarm, Door open when patient unattended, More frequent rounding, Reorient patient, Room close to nurse's station, Toileting rounds, Update white board    Medication Interventions: Bed/chair exit alarm, Patient to call before getting OOB, Teach patient to arise slowly    Elimination Interventions: Bed/chair exit alarm, Call light in reach, Elevated toilet seat, Patient to call for help with toileting needs, Stay With Me (per policy), Toilet paper/wipes in reach, Toileting schedule/hourly rounds, Urinal in reach    History of Falls Interventions: Bed/chair exit alarm, Door open when patient unattended, Room close to nurse's station         Problem: Patient Education: Go to Patient Education Activity  Goal: Patient/Family Education  Outcome: Progressing Towards Goal     Problem: Patient Education: Go to Patient Education Activity  Goal: Patient/Family Education  Outcome: Progressing Towards Goal     Problem: Patient Education: Go to Patient Education Activity  Goal: Patient/Family Education  Outcome: Progressing Towards Goal     Problem: Pain  Goal: *Control of Pain  Outcome: Progressing Towards Goal     Problem: Patient Education: Go to Patient Education Activity  Goal: Patient/Family Education  Outcome: Progressing Towards Goal     Problem: Hypertension  Goal: *Blood pressure within specified parameters  Outcome: Progressing Towards Goal  Goal: *Fluid volume balance  Outcome: Progressing Towards Goal  Goal: *Labs within defined limits  Outcome: Progressing Towards Goal     Problem: Patient Education: Go to Patient Education Activity  Goal: Patient/Family Education  Outcome: Progressing Towards Goal     Problem: Patient Education: Go to Patient Education Activity  Goal: Patient/Family Education  Outcome: Progressing Towards Goal     Problem: Nutrition Deficit  Goal: *Optimize nutritional status  Outcome: Progressing Towards Goal

## 2021-07-11 NOTE — PROGRESS NOTES
Bedside shift change report given to Mike Diop (oncoming nurse) by Francisco Gerardo RN (offgoing nurse). Report included the following information SBAR, Kardex and MAR.

## 2021-07-11 NOTE — ROUTINE PROCESS
Bedside and Verbal shift change report given to Saurabh Lagos RN (oncoming nurse) by Arvind Carrillo RN (offgoing nurse). Report included the following information SBAR, Kardex, MAR and Recent Results.     SITUATION:  Code Status: Full Code  Reason for Admission: HTN (hypertension), malignant [address] day: 8  Problem List:       Hospital Problems  Date Reviewed: 12/11/2017        Codes Class Noted POA    * (Principal) Hypertensive urgency ICD-10-CM: I16.0  ICD-9-CM: 401.9  7/4/2021 Unknown        HTN (hypertension), malignant ICD-10-CM: I10  ICD-9-CM: 401.0  7/3/2021 Unknown              BACKGROUND:   Past Medical History:   Past Medical History:   Diagnosis Date    Anxiety     Arthritis     Diabetes ([facility] [address]     Edema of extremities     Hematologic disorder     Hepatitis C carrier ([facility] [address]     Hypercholesterolemia     Hypertension     Insomnia     Labyrinthitis     Menopause     Primary osteoarthritis of knees, bilateral     Urge incontinence     Vitamin D deficiency       Patient taking anticoagulants yes    Patient has a defibrillator: no    History of shots YES for example, flu, pneumonia, tetanus   Isolation History NO for example, MRSA, CDiff    ASSESSMENT:  Changes in Assessment Throughout Shift: NONE  Significant Changes in 24 hours (for example, RR/code, fall)  Patient has Central Line: no  Patient has Huggins Cath: no   Mobility Issues  PT  IV Patency  OR Checklist  Pending Tests    Last Vitals:  Vitals w/ MEWS Score (last day)     Date/Time MEWS Score Pulse Resp Temp BP Level of Consciousness SpO2    07/11/21 0441  1  65  19  97.5 °F (36.4 °C)  124/77  Alert (0)  98 %    07/11/21 0203  1  81  19  98.2 °F (36.8 °C)  (!) 141/79  Alert (0)  100 %    07/10/21 1946  1  91  20  98.4 °F (36.9 °C)  (!) 144/89  Alert (0)  95 %    07/10/21 1643  1  82  18  98.5 °F (36.9 °C)  120/70  Alert (0)  94 %    07/10/21 1139  1  82  16  98.5 °F (36.9 °C)  (!) 148/84  Alert (0)  96 %    07/10/21 0800  1  88  18  97 °F (36.1 °C)  (!) 161/98  Alert (0)  96 %    07/10/21 0430  1  76  18  98 °F (36.7 °C)  (!) 150/90  Alert (0)  98 %    07/10/21 0030  1  73  18  97.8 °F (36.6 °C)  (!) 152/88  Alert (0)  98 %            PAIN    Pain Assessment    Pain Intensity 1: 0 (07/11/21 0425)    Pain Location 1: Leg    Pain Intervention(s) 1: Medication (see MAR) (pt has dementia, does not understand pain scale. tylenol giv)    Patient Stated Pain Goal: 0  Intervention effective: N/A  Time of last intervention: N/A Reassessment Completed: yes   Other actions taken for pain: Distraction    Last 3 Weights:  Last 3 Recorded Weights in this Encounter    07/07/21 0252 07/07/21 0506 07/07/21 2011   Weight: 80.1 kg (176 lb 9.6 oz) 79.6 kg (175 lb 8 oz) 78.5 kg (173 lb)   Weight change:     INTAKE/OUPUT    Current Shift: No intake/output data recorded. Last three shifts: 07/09 1901 - 07/11 0700  In: 300 [P.O.:300]  Out: 3050 [Urine:3050]    RECOMMENDATIONS AND DISCHARGE PLANNING  Patient needs and requests: Assistance with ADL's    Pending tests/procedures: labs     Discharge plan for patient: Home    Discharge planning Needs or Barriers: N/A    Estimated Discharge Date: 7/13/2021 Posted on Whiteboard in Patients Room: yes       \"HEALS\" SAFETY CHECK  A safety check occurred in the patient's room between off going nurse and oncoming nurse listed above. The safety check included the below items:    H  High Alert Medications Verify all high alert medication drips (heparin, PCA, etc.)  E  Equipment Suction is set up for ALL patients (with yanker)  Red plugs utilized for all equipment (IV pumps, etc.)  WOWs wiped down at end of shift.   Room stocked with oxygen, suction, and other unit-specific supplies  A  Alarms Bed alarm is set for fall risk patients  Ensure chair alarm is in place and activated if patient is up in a chair  L  Lines Check IV for any infiltration  Huggins bag is empty if patient has a Huggins   Tubing and IV bags are labeled  S  Safety  Room is clean, patient is clean, and equipment is clean. Hallways are clear from equipment besides carts. Fall bracelet on for fall risk patients  Ensure room is clear and free of clutter  Suction is set up for ALL patients (with soledad)  Hallways are clear from equipment besides carts.    Isolation precautions followed, supplies available outside room, sign posted    Cady Delaney RN

## 2021-07-11 NOTE — PROGRESS NOTES
[facility] Hospitalists  Progress Note    Patient: Kyleigh Owen Age: 80 y.o. : 1934 MR#: [id number] SSN: [id number]  Date: 2021     Subjective/24-hour events:     Nothing new or acute overnight. No chest pain or shortness of breath acutely. Assessment:   Hypertensive urgency, resolved  Hypertensive encephalopathy, improved  YASMANY on CKD 3, multifactorial - diastolic heart failure, hypertensive emergency  Hypertension  DM2, A1c 5.9%  Hyperlipidemia  Anxiety     Plan:   Blood pressures remaining in good control. Continue current antihypertensive regimen as ordered. Losartan resumed yesterday per nephrology recommendation. Renal indices still improving, nephrology following. Patient is off of IV fluids currently and Lasix remains on hold. Sugars in good control overall, continue SSI as ordered. PT/OT, mobilize as much as able/tolerated. Patient has accepting nursing facility, will follow up with care management in a.m. to see if authorization has been obtained. She is medically stable for discharge pending disposition arrangements being finalized. Case discussed with:  [x]Patient  [x]Family  [x]Nursing  []Case Management  DVT Prophylaxis:  [x]Lovenox  []Hep SQ  []SCDs  []Coumadin   []On Heparin gtt    Objective:   VS:   Visit Vitals  /77 (BP 1 Location: Right upper arm, BP Patient Position: At rest;Lying)   Pulse 65   Temp 97.5 °F (36.4 °C)   Resp 19   Ht 5' 4\" (1.626 m)   Wt 78.5 kg (173 lb)   SpO2 98%   BMI 29.70 kg/m²      Tmax/24hrs: Temp (24hrs), Av.2 °F (36.8 °C), Min:97.5 °F (36.4 °C), Max:98.5 °F (36.9 °C)      Intake/Output Summary (Last 24 hours) at 2021 0816  Last data filed at 2021 0659  Gross per 24 hour   Intake 240 ml   Output 1500 ml   Net -1260 ml       General:  In NAD. Nontoxic-appearing. Cardiovascular:  RRR. Pulmonary:  Lungs clear bilaterally, no wheezes. GI:  Abdomen soft, NTTP.   Extremities:  Warm, no edema or ischemia. Neuro:  Awake, alert and appropriate. Moves extremities spontaneously.     Labs:    Recent Results (from the past 24 hour(s))   GLUCOSE, POC    Collection Time: 07/10/21  9:07 AM   Result Value Ref Range    Glucose (POC) 124 (H) 70 - 110 mg/dL   GLUCOSE, POC    Collection Time: 07/10/21 11:43 AM   Result Value Ref Range    Glucose (POC) 196 (H) 70 - 110 mg/dL   GLUCOSE, POC    Collection Time: 07/10/21  5:05 PM   Result Value Ref Range    Glucose (POC) 136 (H) 70 - 110 mg/dL   GLUCOSE, POC    Collection Time: 07/10/21 10:16 PM   Result Value Ref Range    Glucose (POC) 169 (H) 70 - 696 mg/dL   METABOLIC PANEL, BASIC    Collection Time: 07/11/21  5:46 AM   Result Value Ref Range    Sodium 136 136 - 145 mmol/L    Potassium 4.1 3.5 - 5.5 mmol/L    Chloride 107 100 - 111 mmol/L    CO2 26 21 - 32 mmol/L    Anion gap 3 3.0 - 18 mmol/L    Glucose 94 74 - 99 mg/dL    BUN 26 (H) 7.0 - 18 MG/DL    Creatinine 1.35 (H) 0.6 - 1.3 MG/DL    BUN/Creatinine ratio 19 12 - 20      GFR est AA 45 (L) >60 ml/min/1.73m2    GFR est non-AA 37 (L) >60 ml/min/1.73m2    Calcium 8.7 8.5 - 10.1 MG/DL   CBC W/O DIFF    Collection Time: 07/11/21  5:46 AM   Result Value Ref Range    WBC 5.2 4.6 - 13.2 K/uL    RBC 3.94 (L) 4.20 - 5.30 M/uL    HGB 11.2 (L) 12.0 - 16.0 g/dL    HCT 35.0 35.0 - 45.0 %    MCV 88.8 74.0 - 97.0 FL    MCH 28.4 24.0 - 34.0 PG    MCHC 32.0 31.0 - 37.0 g/dL    RDW 14.3 11.6 - 14.5 %    PLATELET 156 748 - 101 K/uL    MPV 10.2 9.2 - 11.8 FL       Signed By: Kal Merritt MD     July 11, 2021

## 2021-07-12 NOTE — PROGRESS NOTES
CM called the Medicaid hotline, this patient currently does not have Medicaid. CM called [employer] Mrs Feliciano Castellanos [phone] #[phone], received a voicemail, CM left message that checking on a patient's Medicaid status, phone number left for a return call. CM called [employer] Mrs Kati Maynard [phone] #[phone], received a voicemail, CM left message that checking on a patient's Medicaid status, phone number left for a return call. CM called Dorcas Ramirez at [facility], and received a voicemail, CM left message to see if they can accept patient, patient is ready for discharge per Doctor, CM left phone number for a return call.          Dania Womack, RN  Case Management [phone]

## 2021-07-12 NOTE — PROGRESS NOTES
Problem: Self Care Deficits Care Plan (Adult)  Goal: *Acute Goals and Plan of Care (Insert Text)  Description: Occupational Therapy Goals  Initiated 7/5/2021, Patient with change in status therefore re-evaluated 7/8/2021 all goals modified to be achieved within 7 day(s). 1.  Patient will perform grooming with supervision/ set-up sitting EOB, F balance. 2.  Patient will perform self-feeding with modified independence. 3.  Patient will perform upper body dressing with supervision/ set-up. 4.  Patient will perform toilet transfers with maximal assistance x 1.  5.  Patient will perform all aspects of toileting with moderate assistance. 6.  Patient will participate in upper extremity therapeutic exercise/activities with supervision/ set-upfor 8 minutes. 7.  Patient will utilize energy conservation techniques during functional activities with min verbal cues. 8.  Patient will perform bed mobility in preparation for selfcare with minimal assistance. Prior Level of Function: Mod I with ADLs and functional mobility using SPC     Outcome: Progressing Towards Goal   OCCUPATIONAL THERAPY TREATMENT    Patient: Deo Meyers (67 y.o. female)  Date: 7/12/2021  Diagnosis: HTN (hypertension), malignant [I10] Hypertensive urgency       Precautions: Fall, Skin  PLOF: Mod I with ADLs and functional mobility using SPC    Chart, occupational therapy assessment, plan of care, and goals were reviewed. ASSESSMENT:  Pt presented supine in bed upon therapist arrival and agreeable to work with OT. PT co tx to maximize pt safety and participation. Pt able to follow simple commands this date with slight confusion. Pt was assisted to EOB from supine with MIN A in prep for functional task. STS transfer MIN A with RW requiring mod cueing for proper hand placement.  MOD A x 2 to maneuvered to [facility] w/ RW demonstrating poor safety awareness with poor eccentric control from stand-sit transition and educated at length on proper safety techniques to reduce fall risk. Pt required MOD A for toileting routine 2/2 decreased balance and for thoroughness utilizing RW. MOD A x 2 SPT back to EOB. Once sitting EOB she demo G balance and engaged in oral care with S/U. She was returned back to supine with [address] for L LE mgmt. Pt was left w/ HOB elevated and all needs left within reach. Progression toward goals:  []          Improving appropriately and progressing toward goals  [x]          Improving slowly and progressing toward goals  []          Not making progress toward goals and plan of care will be adjusted     PLAN:  Patient continues to benefit from skilled intervention to address the above impairments. Continue treatment per established plan of care. Discharge Recommendations: Rehab   Further Equipment Recommendations for Discharge:  TBA     SUBJECTIVE:   Patient stated  I am ready to go home. \"     OBJECTIVE DATA SUMMARY:   Cognitive/Behavioral Status:  Neurologic State: Alert  Orientation Level: Oriented to person, Oriented to place, Oriented to time  Cognition: Follows commands  Safety/Judgement: Fall prevention    Functional Mobility and Transfers for ADLs:   Bed Mobility:  Rolling: Supervision  Supine to Sit: Minimum assistance  Sit to Supine: Minimum assistance  Scooting: Total assistance;Assist x2   Transfers:  Sit to Stand: Minimum assistance; Moderate assistance  Stand to Sit: Minimum assistance     Bed to Chair: Moderate assistance;Assist x2 (BSC)      Balance:  Sitting: Impaired  Sitting - Static: Good (unsupported)  Sitting - Dynamic: Good (unsupported)  Standing: Impaired  Standing - Static: Fair  Standing - Dynamic : Poor    ADL Intervention:       Grooming  Grooming Assistance: Set-up  Position Performed: Seated edge of bed  Brushing Teeth: Set-up    Toileting  Toileting Assistance: Moderate assistance  Bladder Hygiene:  Moderate assistance  Clothing Management: Moderate assistance  Adaptive Equipment: Walker       Pain:  Pain level pre-treatment: 0/10   Pain level post-treatment: 0/10    Activity Tolerance:    Fair   Please refer to the flowsheet for vital signs taken during this treatment. After treatment:   []  Patient left in no apparent distress sitting up in chair  [x]  Patient left in no apparent distress in bed  [x]  Call bell left within reach  [x]  Nursing notified  []  Caregiver present  [x]  Bed alarm activated    COMMUNICATION/EDUCATION:   [x] Role of Occupational Therapy in the acute care setting  [] Home safety education was provided and the patient/caregiver indicated understanding. [x] Patient/family have participated as able in working towards goals and plan of care. [x] Patient/family agree to work toward stated goals and plan of care. [] Patient understands intent and goals of therapy, but is neutral about his/her participation. [] Patient is unable to participate in goal setting and plan of care.       Thank you for this referral.  ARMAAN Noel  Time Calculation: 23 mins

## 2021-07-12 NOTE — PROGRESS NOTES
[facility] Hospitalists  Progress Note    Patient: Shirlene Mccall Age: 80 y.o. : 1934 MR#: [id number] SSN: [id number]  Date: 2021     Subjective/24-hour events:     No complaints. Assessment:   Hypertensive urgency, resolved  Hypertensive encephalopathy, improved  YASMANY on CKD 3, multifactorial - diastolic heart failure, hypertensive emergency  Hypertension  DM2, A1c 5.9%  Hyperlipidemia  Anxiety     Plan:   Continue to monitor lytes/renal indices. Monitor BPs, adjust medical therapy further to optimize control, if necessary. Supportive care otherwise - patient medically stable for discharge once disposition arrangements made. Case discussed with:  [x]Patient  [x]Family  [x]Nursing  [x]Case Management  DVT Prophylaxis:  [x]Lovenox  []Hep SQ  []SCDs  []Coumadin   []On Heparin gtt    Objective:   VS:   Visit Vitals  BP (!) 133/92 (BP 1 Location: Right upper arm, BP Patient Position: At rest)   Pulse 84   Temp 97.5 °F (36.4 °C)   Resp 19   Ht 5' 4\" (1.626 m)   Wt 78.5 kg (173 lb)   SpO2 99%   BMI 29.70 kg/m²      Tmax/24hrs: Temp (24hrs), Av.8 °F (36.6 °C), Min:97.3 °F (36.3 °C), Max:98.4 °F (36.9 °C)      Intake/Output Summary (Last 24 hours) at 2021 1341  Last data filed at 2021 1337  Gross per 24 hour   Intake 1080 ml   Output 925 ml   Net 155 ml       General:  In NAD. Nontoxic-appearing. Cardiovascular:  RRR. Pulmonary:  Lungs clear bilaterally, no wheezes. GI:  Abdomen soft, NTTP. Extremities:  Warm, no edema or ischemia. Neuro:  Awake, alert and appropriate. Moves extremities spontaneously.     Labs:    Recent Results (from the past 24 hour(s))   GLUCOSE, POC    Collection Time: 21  4:05 PM   Result Value Ref Range    Glucose (POC) 127 (H) 70 - 110 mg/dL   GLUCOSE, POC    Collection Time: 21  9:31 PM   Result Value Ref Range    Glucose (POC) 153 (H) 70 - 057 mg/dL   METABOLIC PANEL, BASIC    Collection Time: 21  1:28 AM Result Value Ref Range    Sodium 138 136 - 145 mmol/L    Potassium 4.5 3.5 - 5.5 mmol/L    Chloride 106 100 - 111 mmol/L    CO2 26 21 - 32 mmol/L    Anion gap 6 3.0 - 18 mmol/L    Glucose 90 74 - 99 mg/dL    BUN 30 (H) 7.0 - 18 MG/DL    Creatinine 1.39 (H) 0.6 - 1.3 MG/DL    BUN/Creatinine ratio 22 (H) 12 - 20      GFR est AA 43 (L) >60 ml/min/1.73m2    GFR est non-AA 36 (L) >60 ml/min/1.73m2    Calcium 8.7 8.5 - 10.1 MG/DL   GLUCOSE, POC    Collection Time: 07/12/21  9:09 AM   Result Value Ref Range    Glucose (POC) 176 (H) 70 - 110 mg/dL   GLUCOSE, POC    Collection Time: 07/12/21 12:26 PM   Result Value Ref Range    Glucose (POC) 157 (H) 70 - 110 mg/dL       Signed By: Nova Regalado MD     July 12, 2021

## 2021-07-12 NOTE — PROGRESS NOTES
Problem: Pressure Injury - Risk of  Goal: *Prevention of pressure injury  Outcome: Progressing Towards Goal  Note: Pressure Injury Interventions:  Sensory Interventions: Assess changes in LOC    Moisture Interventions: Absorbent underpads    Activity Interventions: Increase time out of bed    Mobility Interventions: HOB 30 degrees or less    Nutrition Interventions: Document food/fluid/supplement intake    Friction and Shear Interventions: Lift sheet, HOB 30 degrees or less, Minimize layers                Problem: Patient Education: Go to Patient Education Activity  Goal: Patient/Family Education  Outcome: Progressing Towards Goal     Problem: Pain  Goal: *Control of Pain  Outcome: Progressing Towards Goal     Problem: Hypertension  Goal: *Blood pressure within specified parameters  Outcome: Progressing Towards Goal     Problem: Nutrition Deficit  Goal: *Optimize nutritional status  Outcome: Progressing Towards Goal

## 2021-07-12 NOTE — PALLIATIVE CARE
Joshua Stearns Else called Ms. Varghese ([phone]. [phone]) to verify patient status with Medicaid. Mrs. Mordechai Skiff stated that Mrs. Sahni is in benefits and will be able to see if the patient is Medicaid pending. Spoke with Ike Claire, who stated she has spoken to Mrs. Sahni and will contact Terence at [facility] to find out if they will accept patient with Medicaid pending.

## 2021-07-12 NOTE — PROGRESS NOTES
Problem: Mobility Impaired (Adult and Pediatric)  Goal: *Acute Goals and Plan of Care (Insert Text)  Description: Physical Therapy Goals  Updated 7/8/2021 d/t cognition change  1. Patient will follow 90% of commands to maximize safety and active participation in mobility training. 2.  Patient will move from supine to sit and sit to supine in bed with minimal assistance/contact guard assist.    3.  Patient will maintain seated at edge of bed for 8 min with supervision/set-up to prepare for out of bed activity. 4.  Patient will perform sit to stand with minimal assistance/contact guard assist.  5.  Patient will transfer from bed to chair and chair to bed with minimal assistance/contact guard assist using the least restrictive device. 6.  Patient will ambulate with minimal assistance/contact guard assist for 10 feet with the least restrictive device. Initiated 7/5/2021 and to be accomplished within 7 day(s)  1. Patient will move from supine to sit and sit to supine in bed with modified independence. 2.  Patient will transfer from bed to chair and chair to bed with modified independence using the least restrictive device. 3.  Patient will perform sit to stand with modified independence. 4.  Patient will ambulate with modified independence for 50 feet with the least restrictive device. 5.  Patient will ascend/descend 3 stairs with handrail(s) with minimal assistance/contact guard assist.    PLOF: Amb with cane. Lives alone on first floor of two story home. Outcome: Progressing Towards Goal   PHYSICAL THERAPY TREATMENT    Patient: Gaudencio Condon (44 y.o. female)  Date: 7/12/2021  Diagnosis: HTN (hypertension), malignant [I10] Hypertensive urgency  Precautions: Fall, Skin  ASSESSMENT:  Co-treated with OT to maximize patient safety and participation in functional mobility. Oriented to self, place, and month/year.  Follows simple commands; additional time and verbal cues for attention needed intermittently. Min A for supine to sit. Good seated balance. Min A for sit to stand. Cues for safety with ww and hand placement. Mod A x2 for transfer with standard walker to bedside commode. Poor safety awareness; sitting prior to reaching bedside commode. Educated on safety; verbalized understanding. Mod A for sit to stand from bedside commode. Min/Mod A for standing balance at walker for clean-up post toileting. Mod Ax2 for return stand pivot transfer to bed. Continues to demonstrate poor safety awareness despite prior education. Completed reaching tasks at seated EOB. Min A for sit to supine. Unable to self manage LLE; educated on quiana-technique to assist LLE with RLE. Seated in bed with HOB elevated. Educated on need for RN assistance with mobility; verbalized understanding. Call bell in reach. Progression toward goals:   []      Improving appropriately and progressing toward goals  [x]      Improving slowly and progressing toward goals  []      Not making progress toward goals and plan of care will be adjusted     PLAN:  Patient continues to benefit from skilled intervention to address the above impairments. Continue treatment per established plan of care. Discharge Recommendations:  Rehab  Further Equipment Recommendations for Discharge:  rolling walker     SUBJECTIVE:   Patient stated When can I go home?     OBJECTIVE DATA SUMMARY:   Critical Behavior:  Neurologic State: Alert  Orientation Level: Oriented to person;Oriented to place;Oriented to time  Cognition: Follows commands     Psychosocial  Patient Behaviors: Cooperative    Functional Mobility:  Bed Mobility:  Rolling: Supervision  Supine to Sit: Minimum assistance  Sit to Supine: Minimum assistance  Scooting: Total assistance;Assist x2  Transfers:  Sit to Stand: Minimum assistance; Moderate assistance  Stand to Sit: Minimum assistance  Bed to Chair: Moderate assistance;Assist x2  Balance:   Sitting: Impaired  Sitting - Static: Good (unsupported)  Sitting - Dynamic: Good (unsupported)  Standing: Impaired  Standing - Static: Fair  Standing - Dynamic : Poor  Ambulation/Gait Training:  Distance (ft): 2 Feet (ft)   Assistive Device: Walker  Ambulation - Level of Assistance: Moderate assistance  Neuro Re-Education:  Seated balance 8 minutes  Standing balance 2 minutes    Pain:  Pain level pre-treatment: 0/10  Pain level post-treatment: 0/10     Activity Tolerance:   Fair    After treatment:   [] Patient left in no apparent distress sitting up in chair  [x] Patient left in no apparent distress in bed  [x] Call bell left within reach  [x] Nursing notified  [] Caregiver present  [] Bed alarm activated  [] SCDs applied      COMMUNICATION/EDUCATION:   [x]         Role of physical therapy in the acute care setting. [x]         Fall prevention education was provided and the patient/caregiver indicated understanding. [x]         Patient/family have participated as able in working toward goals and plan of care. [x]         Patient/family agree to work toward stated goals and plan of care. []         Patient understands intent and goals of therapy, but is neutral about his/her participation. []         Patient is unable to participate in stated goals/plan of care: ongoing with therapy staff.       Sheri Ugalde, PT   Time Calculation: 18 mins

## 2021-07-12 NOTE — PROGRESS NOTES
Pt in bed resting alert and oriented x3 denies pain at the moment. Assessement completed plan of care for the shift explained pt verbalized understanding.   ell, HOB elevated, bed low and in locked position. Call light and frequently used items within reach. Bedside and Verbal shift change report given to april HEATH (oncoming nurse) by Jh Iyer RN (offgoing nurse). Report included the following information SBAR, Kardex, Intake/Output, MAR and Recent Results.

## 2021-07-12 NOTE — PROGRESS NOTES
In Patient Progress note      Admit Date: 7/3/2021          Impression:     1. 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 HTN Urgency , improved   3. HFpEF and diastolic Dysfunction , compensated , volume status ok  4. DM   5.   Plan   1. Ok to restart lasix 20 mg BID on discharge   2. Already started on losartan   3. follow cardiology recs   4. Follow labs     F/u with renal post discharge     Please call with questions,    Anna Chaney MD FASN  Cell [phone]  Pager: [phone]       Subjective:     - No acute over night events. - respiratory - stable  - hemodynamics - stable, no pressrs  - UOP- good  - Nutrition -poor    Objective:     Visit Vitals  BP (!) 133/92 (BP 1 Location: Right upper arm, BP Patient Position: At rest)   Pulse 84   Temp 97.5 °F (36.4 °C)   Resp 19   Ht 5' 4\" (1.626 m)   Wt 78.5 kg (173 lb)   SpO2 99%   BMI 29.70 kg/m²         Intake/Output Summary (Last 24 hours) at 7/12/2021 1641  Last data filed at 7/12/2021 1337  Gross per 24 hour   Intake 1080 ml   Output 925 ml   Net 155 ml       Physical Exam:     General: NAD, alert and oriented. Neck: No jvd. LUNGS: Clear to Auscultation, No rales, rhonchi or wheezes. CVS EXM: S1, S2  RRR, no murmurs/gallops/rubs. Abdomen: soft, non tender. Lower Extremities:  no edema.      Data Review:    Recent Labs     07/11/21  0546   WBC 5.2   RBC 3.94*   HCT 35.0   MCV 88.8   MCH 28.4   MCHC 32.0   RDW 14.3     Recent Labs     07/12/21  0128 07/11/21  0546   BUN 30* 26*   CREA 1.39* 1.35*   CA 8.7 8.7   K 4.5 4.1    136    107   CO2 26 26   GLU 90 94       Ann Kenyon MD

## 2021-07-12 NOTE — PROGRESS NOTES
CM called Mrs. Vivienne Freeman at [facility] [phone] #[phone], updated her, checking on Medicaid status. Mrs. Varghese transferred CM to [address] [place] [address], updated her, she asked if UAI/LTSS was done, let her know UAI was done on 05/24/2021 at [facility]. Mrs. Sahni said patient has very limited Medicaid, and will need to be converted to LTC. CM gave Mrs. Sahni patient's nephew for a contact, Bennie Moore [phone]. CM called and spoke with Adriano Fernandes at [facility], updated her with above information, she said they do take Medicaid pending patients, but wants to run it by the business office, and said hopefully they can take patient tomorrow for LTC. CM updated Miriam Rivers CM Manager via [employer]. CM updated Dr. Gemma Curry via [employer].            Darek Mercado, RN  Case Management [phone]

## 2021-07-12 NOTE — PROGRESS NOTES
Problem: Pressure Injury - Risk of  Goal: *Prevention of pressure injury  Description: Document John Scale and appropriate interventions in the flowsheet. Outcome: Progressing Towards Goal  Note: Pressure Injury Interventions:  Sensory Interventions: Keep linens dry and wrinkle-free, Minimize linen layers    Moisture Interventions: Absorbent underpads, Apply protective barrier, creams and emollients, Minimize layers, Limit adult briefs    Activity Interventions: Increase time out of bed, Pressure redistribution bed/mattress(bed type)    Mobility Interventions: HOB 30 degrees or less, Pressure redistribution bed/mattress (bed type)    Nutrition Interventions: Document food/fluid/supplement intake    Friction and Shear Interventions: Lift sheet, HOB 30 degrees or less, Minimize layers                Problem: Patient Education: Go to Patient Education Activity  Goal: Patient/Family Education  Outcome: Progressing Towards Goal     Problem: Falls - Risk of  Goal: *Absence of Falls  Description: Document Oswaldo Fall Risk and appropriate interventions in the flowsheet.   Outcome: Progressing Towards Goal  Note: Fall Risk Interventions:  Mobility Interventions: Bed/chair exit alarm    Mentation Interventions: Bed/chair exit alarm    Medication Interventions: Bed/chair exit alarm    Elimination Interventions: Call light in reach    History of Falls Interventions: Bed/chair exit alarm         Problem: Patient Education: Go to Patient Education Activity  Goal: Patient/Family Education  Outcome: Progressing Towards Goal     Problem: Patient Education: Go to Patient Education Activity  Goal: Patient/Family Education  Outcome: Progressing Towards Goal     Problem: Patient Education: Go to Patient Education Activity  Goal: Patient/Family Education  Outcome: Progressing Towards Goal     Problem: Pain  Goal: *Control of Pain  Outcome: Progressing Towards Goal     Problem: Patient Education: Go to Patient Education Activity  Goal: Patient/Family Education  Outcome: Progressing Towards Goal     Problem: Hypertension  Goal: *Blood pressure within specified parameters  Outcome: Progressing Towards Goal  Goal: *Fluid volume balance  Outcome: Progressing Towards Goal  Goal: *Labs within defined limits  Outcome: Progressing Towards Goal     Problem: Patient Education: Go to Patient Education Activity  Goal: Patient/Family Education  Outcome: Progressing Towards Goal     Problem: Patient Education: Go to Patient Education Activity  Goal: Patient/Family Education  Outcome: Progressing Towards Goal     Problem: Nutrition Deficit  Goal: *Optimize nutritional status  Outcome: Progressing Towards Goal

## 2021-07-13 NOTE — DISCHARGE INSTRUCTIONS
DISCHARGE SUMMARY from Nurse    PATIENT INSTRUCTIONS:    After general anesthesia or intravenous sedation, for 24 hours or while taking prescription Narcotics:  · Limit your activities  · Do not drive and operate hazardous machinery  · Do not make important personal or business decisions  · Do  not drink alcoholic beverages  · If you have not urinated within 8 hours after discharge, please contact your surgeon on call. Report the following to your surgeon:  · Excessive pain, swelling, redness or odor of or around the surgical area  · Temperature over 100.5  · Nausea and vomiting lasting longer than 4 hours or if unable to take medications  · Any signs of decreased circulation or nerve impairment to extremity: change in color, persistent  numbness, tingling, coldness or increase pain  · Any questions    What to do at Home:  Recommended activity: Activity as tolerated, ***    If you experience any of the following symptoms **chest pain ,shortness of breath, fever, nausea/vomiting lasting longer than 4hrs, pain unrelieved by medication *, please follow up with Deborah Brambila. *  Please give a list of your current medications to your Primary Care Provider. *  Please update this list whenever your medications are discontinued, doses are      changed, or new medications (including over-the-counter products) are added. *  Please carry medication information at all times in case of emergency situations. These are general instructions for a healthy lifestyle:    No smoking/ No tobacco products/ Avoid exposure to second hand smoke  Surgeon General's Warning:  Quitting smoking now greatly reduces serious risk to your health.     Obesity, smoking, and sedentary lifestyle greatly increases your risk for illness    A healthy diet, regular physical exercise & weight monitoring are important for maintaining a healthy lifestyle    You may be retaining fluid if you have a history of heart failure or if you experience any of the following symptoms:  Weight gain of 3 pounds or more overnight or 5 pounds in a week, increased swelling in our hands or feet or shortness of breath while lying flat in bed. Please call your doctor as soon as you notice any of these symptoms; do not wait until your next office visit. Patient armband removed and shredded  MyChart Activation    Thank you for requesting access to [employer]. Please follow the instructions below to securely access and download your online medical record. [employer] allows you to send messages to your doctor, view your test results, renew your prescriptions, schedule appointments, and more. How Do I Sign Up? 1. In your internet browser, go to [email or web address]  2. Click on the First Time User? Click Here link in the Sign In box. You will be redirect to the New Member Sign Up page. 3. Enter your [employer] Access Code exactly as it appears below. You will not need to use this code after youve completed the sign-up process. If you do not sign up before the expiration date, you must request a new code. [employer] Access Code: [email or web address]  Expires: 2021 11:38 AM (This is the date your [employer] access code will )    4. Enter the last four digits of your Social Security Number (xxxx) and Date of Birth (mm/dd/yyyy) as indicated and click Submit. You will be taken to the next sign-up page. 5. Create a [facility] ID. This will be your [employer] login ID and cannot be changed, so think of one that is secure and easy to remember. 6. Create a [employer] password. You can change your password at any time. 7. Enter your Password Reset Question and Answer. This can be used at a later time if you forget your password. 8. Enter your e-mail address. You will receive e-mail notification when new information is available in [address] 9. Click Sign Up. You can now view and download portions of your medical record.   10. Click the Download Summary menu link to download a portable copy of your medical information. Additional Information    If you have questions, please visit the Frequently Asked Questions section of the [employer] website at [email or web address] Remember, [employer] is NOT to be used for urgent needs. For medical emergencies, dial 911. .The discharge information has been reviewed with the {PATIENT PARENT GUARDIAN:[id number]}. The {PATIENT PARENT GUARDIAN:42241} verbalized understanding. Discharge medications reviewed with the {[employer] meds reviewed FQEK:21188} and appropriate educational materials and side effects teaching were provided.   ___________________________________________________________________________________________________________________________________

## 2021-07-13 NOTE — PROGRESS NOTES
In Patient Progress note    Admit Date: 7/3/2021     Impression:     1. 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 HTN Urgency , improved   3. HFpEF and diastolic Dysfunction , compensated , volume status ok  4. DM     Plan   1. restart lasix 20 mg BID on discharge   2. Already started on losartan   3. follow cardiology recs   4. Follow labs     F/u with renal post discharge     Please call with questions,    Verlon Holter, MD FASN  Cell [phone]  Pager: [phone]       Subjective:     - No acute over night events. - respiratory - stable  - hemodynamics - stable, no pressrs  - UOP- good  - Nutrition -poor    Objective:     Visit Vitals  /75 (BP 1 Location: Right upper arm, BP Patient Position: At rest)   Pulse 80   Temp 97.8 °F (36.6 °C)   Resp 18   Ht 5' 4\" (1.626 m)   Wt 78.5 kg (173 lb)   SpO2 98%   BMI 29.70 kg/m²         Intake/Output Summary (Last 24 hours) at 7/13/2021 1706  Last data filed at 7/13/2021 1243  Gross per 24 hour   Intake 240 ml   Output 600 ml   Net -360 ml       Physical Exam:     General: NAD, alert and oriented. Neck: No jvd. LUNGS: Clear to Auscultation, No rales, rhonchi or wheezes. CVS EXM: S1, S2  RRR, no murmurs/gallops/rubs. Abdomen: soft, non tender. Lower Extremities:  no edema.      Data Review:    Recent Labs     07/11/21  0546   WBC 5.2   RBC 3.94*   HCT 35.0   MCV 88.8   MCH 28.4   MCHC 32.0   RDW 14.3     Recent Labs     07/12/21  0128 07/11/21  0546   BUN 30* 26*   CREA 1.39* 1.35*   CA 8.7 8.7   K 4.5 4.1    136    107   CO2 26 26   GLU 90 94       Alessia England MD

## 2021-07-13 NOTE — PROGRESS NOTES
Problem: Pressure Injury - Risk of  Goal: *Prevention of pressure injury  Description: Document John Scale and appropriate interventions in the flowsheet. Outcome: Progressing Towards Goal  Note: Pressure Injury Interventions:  Sensory Interventions: Assess changes in LOC    Moisture Interventions: Absorbent underpads, Check for incontinence Q2 hours and as needed    Activity Interventions: Increase time out of bed, Pressure redistribution bed/mattress(bed type)    Mobility Interventions: Pressure redistribution bed/mattress (bed type)    Nutrition Interventions: Document food/fluid/supplement intake    Friction and Shear Interventions: Foam dressings/transparent film/skin sealants, Minimize layers                Problem: Patient Education: Go to Patient Education Activity  Goal: Patient/Family Education  Outcome: Progressing Towards Goal     Problem: Falls - Risk of  Goal: *Absence of Falls  Description: Document Oswaldo Fall Risk and appropriate interventions in the flowsheet.   Outcome: Progressing Towards Goal  Note: Fall Risk Interventions:  Mobility Interventions: Utilize walker, cane, or other assistive device, PT Consult for mobility concerns, Bed/chair exit alarm, Patient to call before getting OOB, OT consult for ADLs    Mentation Interventions: Adequate sleep, hydration, pain control, Bed/chair exit alarm, Door open when patient unattended, Room close to nurse's station, Reorient patient    Medication Interventions: Bed/chair exit alarm, Patient to call before getting OOB, Teach patient to arise slowly    Elimination Interventions: Bed/chair exit alarm, Call light in reach, Patient to call for help with toileting needs, Toileting schedule/hourly rounds    History of Falls Interventions: Bed/chair exit alarm, Door open when patient unattended, Room close to nurse's station         Problem: Pain  Goal: *Control of Pain  Outcome: Progressing Towards Goal     Problem: Hypertension  Goal: *Fluid volume balance  Outcome: Progressing Towards Goal  Goal: *Labs within defined limits  Outcome: Progressing Towards Goal     Problem: Nutrition Deficit  Goal: *Optimize nutritional status  Outcome: Progressing Towards Goal

## 2021-07-13 NOTE — DISCHARGE SUMMARY
Discharge Summary    Patient: Christopher Frankel MRN: [id number]  CSN: [id number]    YOB: 1934  Age: 80 y.o. Sex: female    DOA: 7/3/2021 LOS:  LOS: 10 days   Discharge Date: 7/13/2021     Admission Diagnoses: HTN (hypertension), malignant [I10]    Discharge Diagnoses:    Hypertensive urgency, resolved  Hypertensive encephalopathy, improved  YASMANY on CKD 3, multifactorial - diastolic heart failure, hypertensive emergency  Hypertension  DM2, A1c 5.9%  Hyperlipidemia  Cholelithiasis without evidence for cholecystitis  Left renal cyst  Anxiety  Advanced age    Discharge Condition: Stable    PHYSICAL EXAM  Visit Vitals  BP (!) 139/92   Pulse 80   Temp 98 °F (36.7 °C)   Resp 18   Ht 5' 4\" (1.626 m)   Wt 78.5 kg (173 lb)   SpO2 99%   BMI 29.70 kg/m²       General: In NAD. Nontoxic-appearing. HEENT: NCAT. Sclerae anicteric, EOMI. Lungs:  Clear, no wheezes. No accessory muscle use. Heart:  RRR. Abdomen: Soft, NT/ND. Extremities: Warm, no edema or ischemia. Psych:   Mood normal, no agitation. Neurologic:  Awake and alert, moves extremities spontaneously. Hospital Course:   See admission H&P for full details of HPI. Patient was admitted to the hospital on 7/3/2021 after presenting to the ED for evaluation of shortness of breath. She also complained of progressive weakness and of having fallen multiple times over recent days PTA. Blood pressures were found to be markedly elevated to the 230s over 120s on arrival to the ED and patient required continuous IV infusion for management. Cardiology and nephrology evaluations were obtained for assistance with management. Blood pressure is improved with treatment and patient was able to be transitioned to oral medications. Patient is currently on losartan and Lopressor, both at reduced doses from previous. Blood pressures have been in acceptable control but will obviously need to continue to monitor and adjust medical therapy as necessary.   Lasix has been resumed prior to discharge per recommendation by nephrology -recommend follow-up metabolic panel to trend electrolytes and renal indices over the course of the next several days. Patient was noted to have some increased confusion over the course of 3 to 4 days following admission. Mental status has continued to improve to this point and appears to be at patient's usual baseline currently per family. She has been receiving PT/OT and has been documented to show continued improvement with treatment. SLP has evaluated patient and soft diet with bite-size foods and thin liquids has been recommended. Patient has been stable for the last 2 to 3 days and has been awaiting disposition arrangements. She currently has an accepting nursing facility and is medically stable for transfer there today for continued management. Consults:   Nephrology  Cardiology    Significant Diagnostic Studies/Procedures:   2D echo: Interpretation Summary    Result status: Final result   · LV: Estimated LVEF is 50 - 55%. Visually measured ejection fraction. Normal cavity size and systolic function (ejection fraction normal). Mild concentric hypertrophy. Global hypokinesis with preserved septal function. · Normal right ventricular size with low normal function. · MV: Mitral annular calcification. · AV: Mild aortic valve regurgitation is present. · RV: Borderline low systolic function. · LA: Left Atrium volume index is 29 mL/m2. · Insufficient TR to estimate pulmonary artery pressure. Comparison Study Information    Prior Study    There is no prior study available for comparison           Renal ultrasound:  IMPRESSION  1. No hydronephrosis. 2. Simple appearing left renal cyst.  3. Cholelithiasis. Renal artery PVL:  Abdominal Findings    Renal    Limited visualization due to bowel gas. Renal arteries are without evidence of significant stenosis and kidneys are of normal size bilaterally.      Right renal findings: The right renal vein flow is normal.     Left renal findings: The left renal vein flow is normal.           CXR:  IMPRESSION     No evidence of acute pulmonary disease. Discharge Medications:     Current Discharge Medication List      START taking these medications    Details   furosemide (LASIX) 20 mg tablet Take 1 Tablet by mouth daily. Qty: 30 Tablet, Refills: 0  Start date: 7/13/2021         CONTINUE these medications which have CHANGED    Details   ergocalciferol (Vitamin D2) 1,250 mcg (50,000 unit) capsule Take 1 Capsule by mouth every Sunday. Qty: 4 Capsule, Refills: 0  Start date: 7/18/2021      losartan (COZAAR) 50 mg tablet Take 1 Tablet by mouth daily. Qty: 30 Tablet, Refills: 0  Start date: 7/13/2021      metoprolol tartrate (LOPRESSOR) 25 mg tablet Take 1 Tablet by mouth two (2) times a day. Qty: 60 Tablet, Refills: 0  Start date: 7/13/2021      polyethylene glycol (Miralax) 17 gram/dose powder Take 17 g by mouth daily. 1 tablespoon with 8 oz of water daily  Qty: 117 g, Refills: 0  Start date: 7/13/2021         CONTINUE these medications which have NOT CHANGED    Details   docusate sodium (Colace) 100 mg capsule Take 1 Capsule by mouth two (2) times a day for 30 days. Qty: 60 Capsule, Refills: 0      solifenacin (VESICARE) 10 mg tablet Take 1 Tab by mouth daily. Qty: 90 Tab, Refills: 3      gabapentin (NEURONTIN) 300 mg capsule Take 300 mg by mouth three (3) times daily. STOP taking these medications       traMADol (ULTRAM) 50 mg tablet Comments:   Reason for Stopping:         amLODIPine (NORVASC) 10 mg tablet Comments:   Reason for Stopping:         lisinopril (PRINIVIL, ZESTRIL) 10 mg tablet Comments:   Reason for Stopping:         acyclovir (ZOVIRAX) 200 mg capsule Comments:   Reason for Stopping:                 Activity: As tolerated. Diet: Cardiac Diet. Disposition: Nursing facility.     Follow-up: with nephrology, Dr. Nicola Ng and [facility] as directed. Minutes spent on discharge: >30 minutes spent coordinating this discharge. Nova Regalado MD  [address] [place] [address]    Disclaimer: Sections of this note are dictated using utilizing voice recognition software. Minor typographical errors may be present. If questions arise, please do not hesitate to contact me or call our department.

## 2021-07-13 NOTE — PROGRESS NOTES
Transition of Care Plan to SNF/Rehab    SNF/Rehab Transition:  Patient has been accepted to [address] [place] [address] and meets criteria for admission. Patient will  [facility] and expected to leave at 3:30. Communication to Patient/Family:  Met with patient and spoke with son, Raffi Shelton and Mr. Dain Olszewski  (identified care giver) and they are agreeable to the transition plan. Communication to SNF/Rehab:  Bedside RN, Nanci Perez, has been notified of the transition plan to the facility and to call report (phone number [phone]). Discharge information has been updated on the AVS. And communicated to facility via [facility], or CC link. SNF/Rehab Transition:    PCP/Specialist: [facility]    Nursing Please include all hard scripts for controlled substances, med rec and dc summary, and AVS in packet. Reviewed and confirmed with facility representative, Delia Mullen  at [facility] they can manage the patient care needs for the following:     Rayfield More with (X) only those applicable:    COVID Status  . Patient is Covid not tested  Pt initially tested Covid positive on: Parmjit   It has been  days since initial positive Covid test.      Medication:  [x]  Medications will be available at the facility  []  IV Antibiotics   []  Controlled Substance - hard copy to be sent with patient   []  Weekly Labs    Documents:  [] Hard RX  Number sent   [] MAR  [] Kardex  [] AVS  [] Wound care note  [x]Transfer Summary/Discharge Summary    Equipment:  []  CPAP/BiPAP  []  Wound Vacuum  []  Huggins or Urinary Device  []  PICC/Central Line  []  Nebulizer  []  Ventilator    Treatment:  []Isolation (for MRSA, VRE, etc.)  []Surgical Drain Management  []Tracheostomy Care  []Dressing Changes  []Dialysis with transportation and chair time  Center Mode of tranpsort   []PEG Care  []Oxygen  []Daily Weights for Heart Failure    Dietary:  []Any diet limitations  []Tube Feedings   []Total Parenteral Management (TPN)    Eligible for Medicaid Long Term Services and Supports  Yes:  [x] Eligible for medical assistance or will become eligible within 180 days and LTSS completed. [] Provider/Patient and/or support system has requested screening.  [] LTSS copy provided to patient or responsible party, .  [] LTSS unavailable at discharge will send once processed to SNF provider.  [] LTSS  unavailable at discharged mailed to patient  [] LTSS performed by outside agency  on  with tracking number   No:   [] Private pay and is not financially eligible for Medicaid within the next 180 days. [] Reside out-of-state.   [] A residents of a state owned/operated facility that is licensed  by [facility] or [facility]  [] Enrollment in [facility] hospice services  [] Non [place] citizen  [] Patient /Family declines to have screening completed or provide financial information for screening          Financial Resources:  Medicaid    [] Initiated and application pending   [] Full coverage      Advanced Care Plan:  []Surrogate Decision Maker of Care  []POA  []Communicated Code Status/ sent FULL     Other

## 2021-07-13 NOTE — PROGRESS NOTES
Problem: Pressure Injury - Risk of  Goal: *Prevention of pressure injury  Description: Document John Scale and appropriate interventions in the flowsheet. 7/13/2021 1505 by Jeffery Mclean RN  Outcome: Resolved/Met  7/13/2021 1504 by Jeffery Mclean RN  Outcome: Progressing Towards Goal  Note: Pressure Injury Interventions:  Sensory Interventions: Assess changes in LOC    Moisture Interventions: Absorbent underpads, Check for incontinence Q2 hours and as needed    Activity Interventions: Increase time out of bed, Pressure redistribution bed/mattress(bed type)    Mobility Interventions: Pressure redistribution bed/mattress (bed type)    Nutrition Interventions: Document food/fluid/supplement intake    Friction and Shear Interventions: Foam dressings/transparent film/skin sealants, Minimize layers                Problem: Patient Education: Go to Patient Education Activity  Goal: Patient/Family Education  7/13/2021 1505 by Jeffery Mclean RN  Outcome: Resolved/Met  7/13/2021 1504 by Jeffery Mclean RN  Outcome: Progressing Towards Goal     Problem: Falls - Risk of  Goal: *Absence of Falls  Description: Document Jyoti Spare Fall Risk and appropriate interventions in the flowsheet.   7/13/2021 1505 by Jeffery Mclean RN  Outcome: Resolved/Met  7/13/2021 1504 by Jeffery Mclean RN  Outcome: Progressing Towards Goal  Note: Fall Risk Interventions:  Mobility Interventions: Utilize walker, cane, or other assistive device, PT Consult for mobility concerns, Bed/chair exit alarm, Patient to call before getting OOB, OT consult for ADLs    Mentation Interventions: Adequate sleep, hydration, pain control, Bed/chair exit alarm, Door open when patient unattended, Room close to nurse's station, Reorient patient    Medication Interventions: Bed/chair exit alarm, Patient to call before getting OOB, Teach patient to arise slowly    Elimination Interventions: Bed/chair exit alarm, Call light in reach, Patient to call for help with toileting needs, Toileting schedule/hourly rounds    History of Falls Interventions: Bed/chair exit alarm, Door open when patient unattended, Room close to nurse's station         Problem: Patient Education: Go to Patient Education Activity  Goal: Patient/Family Education  Outcome: Resolved/Met     Problem: Patient Education: Go to Patient Education Activity  Goal: Patient/Family Education  Outcome: Resolved/Met     Problem: Patient Education: Go to Patient Education Activity  Goal: Patient/Family Education  Outcome: Resolved/Met     Problem: Pain  Goal: *Control of Pain  7/13/2021 1505 by Juan Leiva RN  Outcome: Resolved/Met  7/13/2021 1504 by Juan Leiva RN  Outcome: Progressing Towards Goal     Problem: Patient Education: Go to Patient Education Activity  Goal: Patient/Family Education  Outcome: Resolved/Met     Problem: Hypertension  Goal: *Blood pressure within specified parameters  Outcome: Resolved/Met  Goal: *Fluid volume balance  7/13/2021 1505 by Juan Leiva RN  Outcome: Resolved/Met  7/13/2021 1504 by Juan Leiva RN  Outcome: Progressing Towards Goal  Goal: *Labs within defined limits  7/13/2021 1505 by Juan Leiva RN  Outcome: Resolved/Met  7/13/2021 1504 by Juan Leiva RN  Outcome: Progressing Towards Goal     Problem: Patient Education: Go to Patient Education Activity  Goal: Patient/Family Education  Outcome: Resolved/Met     Problem: Patient Education: Go to Patient Education Activity  Goal: Patient/Family Education  Outcome: Resolved/Met     Problem: Nutrition Deficit  Goal: *Optimize nutritional status  7/13/2021 1505 by Juan Leiva RN  Outcome: Resolved/Met  7/13/2021 1504 by Juan Leiva RN  Outcome: Progressing Towards Goal

## 2021-07-13 NOTE — PROGRESS NOTES
Report given to  Clover Cadet RN at [address] [facility], [facility], Kardex and resent results was reported.  Patient scheduled t leave by transport at 3:30 pm.

## 2021-07-13 NOTE — PROGRESS NOTES
Problem: Pressure Injury - Risk of  Goal: *Prevention of pressure injury  Description: Document John Scale and appropriate interventions in the flowsheet. Outcome: Progressing Towards Goal  Note: Pressure Injury Interventions:  Sensory Interventions: Assess changes in LOC    Moisture Interventions: Absorbent underpads    Activity Interventions: Increase time out of bed    Mobility Interventions: HOB 30 degrees or less    Nutrition Interventions: Document food/fluid/supplement intake    Friction and Shear Interventions: Lift sheet, HOB 30 degrees or less, Minimize layers         Problem: Falls - Risk of  Goal: *Absence of Falls  Description: Document Oswaldo Fall Risk and appropriate interventions in the flowsheet.   Outcome: Progressing Towards Goal  Note: Fall Risk Interventions:  Mobility Interventions: Bed/chair exit alarm    Mentation Interventions: Bed/chair exit alarm    Medication Interventions: Bed/chair exit alarm    Elimination Interventions: Call light in reach    History of Falls Interventions: Bed/chair exit alarm    Problem: Pain  Goal: *Control of Pain  Outcome: Progressing Towards Goal     Problem: Patient Education: Go to Patient Education Activity  Goal: Patient/Family Education  Outcome: Progressing Towards Goal     Problem: Hypertension  Goal: *Blood pressure within specified parameters  Outcome: Progressing Towards Goal  Goal: *Fluid volume balance  Outcome: Progressing Towards Goal  Goal: *Labs within defined limits  Outcome: Progressing Towards Goal     Problem: Hypertension  Goal: *Fluid volume balance  Outcome: Progressing Towards Goal     Problem: Patient Education: Go to Patient Education Activity  Goal: Patient/Family Education  Outcome: Progressing Towards Goal     Problem: Patient Education: Go to Patient Education Activity  Goal: Patient/Family Education  Outcome: Progressing Towards Goal     Problem: Nutrition Deficit  Goal: *Optimize nutritional status  Outcome: Progressing Towards Goal      Problem: Patient Education: Go to Patient Education Activity  Goal: Patient/Family Education  Outcome: Progressing Towards Goal   Problem: Patient Education: Go to Patient Education Activity  Goal: Patient/Family Education  Outcome: Progressing Towards Goal

## 2021-07-13 NOTE — PROGRESS NOTES
Bedside shift change report given to Sister regis RN (oncoming nurse) by Wendel Boxer (offgoing nurse). Report included the following information SBAR, Kardex, Intake/Output and MAR.

## 2021-07-16 NOTE — PROGRESS NOTES
Post Acute Facility Update     *The information contained in this note was received during a weekly care coordination call with the post acute facility*    This patient was discharged from [facility] to [facility])   on 7/13/21.     Hospital Discharge Diagnosis per Dr. Eric Todd:    Hypertensive urgency, resolved  Hypertensive encephalopathy, improved  YASMANY on CKD 3, multifactorial - diastolic heart failure, hypertensive emergency  Hypertension  DM2, A1c 5.9%  Hyperlipidemia  Cholelithiasis without evidence for cholecystitis  Left renal cyst  Anxiety  Advanced age        Current Facility: [address] [place] [address] ([facility])  Anticipated Discharge Date: TBD  Initial Therapy eval being completed this week  Facility Nursing Update: no new orders  Facility Social Work Update:  Pt was living alone  Bundle Program Status: none   Other: family considering transition to LTC

## 2021-07-19 NOTE — PROGRESS NOTES
Mynor Soto presents today for   Chief Complaint   Patient presents with    New Patient     referred  by [facility] for CHF        Mynor Soto preferred language for health care discussion is english/other. Is someone accompanying this pt? no    Is the patient using any DME equipment during [address]? no    Depression Screening:  3 most recent PHQ Screens 7/19/2021   PHQ Not Done -   Little interest or pleasure in doing things Not at all   Feeling down, depressed, irritable, or hopeless Not at all   Total Score PHQ 2 0     Fall Risk  Fall Risk Assessment, last 12 mths 7/19/2021   Able to walk? Yes   Fall in past 12 months? 0   Do you feel unsteady? 0   Are you worried about falling 0   Number of falls in past 12 months -   Fall with injury? -       Pt currently taking Anticoagulant therapy? no    Coordination of Care:  1. Have you been to the ER, urgent care clinic since your last visit? Hospitalized since your last visit? 7/3 -7/13 for hypertensive urgency     2. Have you seen or consulted any other health care providers outside of the [address] [place] [address] [place] since your last visit? Include any pap smears or colon screening.  No

## 2021-07-19 NOTE — LETTER
7/19/2021    Patient: Enrique Rivera   YOB: 1934   Date of Visit: 7/19/2021     Lashawn Dove MD  [facility] [address]  Via Fax: [phone]    Dear Lashawn Dove MD,      Thank you for referring Ms. Laura Stevenson to [address] [place] [address] for evaluation. My notes for this consultation are attached. If you have questions, please do not hesitate to call me. I look forward to following your patient along with you.       Sincerely,    Elijah Verduzco MD

## 2021-07-19 NOTE — PROGRESS NOTES
[facility]    Ms. Annie Whitlock is a 24-year-old female with a history of diabetes, hypertension, hyperlipidemia    Patient is here today for cardiac evaluation. She denies any prior history of MI or CHF. Patient was admitted to the hospital recently with shortness of breath. Patient was found to have a hypertensive urgency with hypertensive nephropathy. Patient was treated appropriately with the medications and was sent to rehab facility. Currently patient appears to be in a nursing home facility. She is getting rehabilitation. She would like to go home. She denies any chest pain or chest tightness. She denies any palpitation, presyncope or syncope. She uses 2 pillows at night. Denies any nausea, vomiting, abdominal pain, fever, chills, sputum production. No hematuria or other bleeding complaints    Past Medical History:   Diagnosis Date    Anxiety     Arthritis     [facility]     Edema of extremities     Hematologic disorder     Hepatitis C carrier ([facility]     Hypercholesterolemia     Hypertension     Insomnia     Labyrinthitis     Menopause     Primary osteoarthritis of knees, bilateral     Urge incontinence     Vitamin D deficiency        Review of Systems:  Cardiac symptoms as noted above in HPI. All others negative. Denies fatigue, malaise, skin rash, joint pain, blurring vision, photophobia, neck pain, hemoptysis, chronic cough, nausea, vomiting, hematuria, burning micturition, BRBPR, chronic headaches. Current Outpatient Medications   Medication Sig    ergocalciferol (Vitamin D2) 1,250 mcg (50,000 unit) capsule Take 1 Capsule by mouth every Sunday.  losartan (COZAAR) 50 mg tablet Take 1 Tablet by mouth daily.  metoprolol tartrate (LOPRESSOR) 25 mg tablet Take 1 Tablet by mouth two (2) times a day.  polyethylene glycol (Miralax) 17 gram/dose powder Take 17 g by mouth daily.  1 tablespoon with 8 oz of water daily    furosemide (LASIX) 20 mg tablet Take 1 Tablet by mouth daily.  gabapentin (NEURONTIN) 300 mg capsule Take 1 Capsule by mouth two (2) times a day. Max Daily Amount: 600 mg.    docusate sodium (Colace) 100 mg capsule Take 1 Capsule by mouth two (2) times a day for 30 days.  solifenacin (VESICARE) 10 mg tablet Take 1 Tab by mouth daily. No current facility-administered medications for this visit. Past Surgical History:   Procedure Laterality Date    HX HYSTERECTOMY  36    HX OTHER SURGICAL  2007    Lap Band for weight loss       Allergies and Sensitivities:  Allergies   Allergen Reactions    Contrast Dye [Iodine] Other (comments)     Neurological reaction       Family History:  Family History   Problem Relation Age of Onset    Breast Cancer Sister 80    Ovarian Cancer Sister 33        33s       Social History:  Social History     Tobacco Use    Smoking status: Never Smoker    Smokeless tobacco: Never Used   Substance Use Topics    Alcohol use: No    Drug use: No     She  reports that she has never smoked. She has never used smokeless tobacco.  She  reports no history of alcohol use. Physical Exam:  BP Readings from Last 3 Encounters:   07/19/21 112/60   07/13/21 118/75   06/29/21 128/76         Pulse Readings from Last 3 Encounters:   07/19/21 79   07/13/21 80   06/29/21 96          Wt Readings from Last 3 Encounters:   07/07/21 78.5 kg (173 lb)   06/29/21 85.7 kg (189 lb)   06/25/21 85.7 kg (189 lb)       Constitutional: Oriented to person, place, and time. HENT: Head: Normocephalic and atraumatic. Eyes: Conjunctivae and extraocular motions are normal.   Neck: No JVD present. Carotid bruit is not appreciated. Cardiovascular: Regular rhythm. No murmur, gallop or rubs appreciated  Lung: Breath sounds normal. No respiratory distress. No ronchi or rales appreciated  Abdominal: No tenderness. No rebound and no guarding.    Musculoskeletal: There is trace lower extremity edema. No cynosis      LABS:   @  Lab Results   Component Value Date/Time    WBC 6.6 07/14/2021 07:10 AM    HGB 10.9 (L) 07/14/2021 07:10 AM    HCT 34.2 (L) 07/14/2021 07:10 AM    PLATELET 576 10/84/2633 07:10 AM    MCV 87.2 07/14/2021 07:10 AM     Lab Results   Component Value Date/Time    Sodium 138 07/14/2021 07:10 AM    Potassium 4.4 07/14/2021 07:10 AM    Chloride 106 07/14/2021 07:10 AM    CO2 27 07/14/2021 07:10 AM    Glucose 128 (H) 07/14/2021 07:10 AM    BUN 31 (H) 07/14/2021 07:10 AM    Creatinine 1.34 (H) 07/14/2021 07:10 AM     No flowsheet data found. Lab Results   Component Value Date/Time    ALT (SGPT) 21 07/04/2021 10:30 AM     Lab Results   Component Value Date/Time    Hemoglobin A1c 5.9 (H) 07/04/2021 10:30 AM     Lab Results   Component Value Date/Time    TSH 3.02 07/04/2021 10:30 AM       EKG    ECHO (07/2021)  Left Ventricle Normal cavity size and systolic function (ejection fraction normal). Mild concentric hypertrophy. The estimated EF is 50 - 55%. Visually measured ejection fraction. Wall Scoring The following segments are hypokinetic: basal anterior, basal inferior, basal inferolateral, basal anterolateral, mid anterior, mid inferior, mid inferolateral, mid anterolateral, apical anterior, apical inferior, apical lateral and apex. All other segments are normal.            Left Atrium Normal cavity size. Left Atrium volume index is 29 mL/m2. Right Ventricle Normal cavity size. Borderline low systolic function. Assessment of RV function:   TAPSE = 1.3 mm. TAPSV = 7 mm/s. Right Atrium Normal cavity size. Interatrial Septum Interatrial septal aneurysm present. Aortic Valve Normal valve structure and no stenosis. Mild aortic valve regurgitation. Mitral Valve Normal valve structure and no stenosis. Mitral annular calcification. Trace regurgitation. Tricuspid Valve Normal valve structure and no stenosis. Trace regurgitation. Pulmonic Valve Pulmonic valve not well visualized. Aorta Normal aortic root. Pulmonary Artery Pulmonary hypertension not suggested by Doppler findings. STRESS TEST (EST, PHARM, NUC, ECHO etc)    CATHETERIZATION    IMPRESSION & PLAN:  Patient is 80-year-old female with multiple medical problem    Heart failure with preserved EF:  LVEF 50-55% by echocardiogram in 07/2021  Currently on appropriate antihypertensives. She is on Lasix 20 mg daily. Continue losartan and metoprolol. Salt restriction discussed    Hypertension:  /60. Continue metoprolol and losartan. Currently patient does not appear to have any symptom that is concerning for angina. She does not have decompensated heart failure. Continue with medical management    This plan was discussed with patient who is in agreement. Thank you for allowing me to participate in patient care. Please feel free to call me if you have any question or concern. Nhi Younger MD  Please note: This document has been produced using voice recognition software. Unrecognized errors in transcription may be present.

## 2021-07-23 NOTE — PROGRESS NOTES
Post Acute Facility Update     *The information contained in this note was received during a weekly care coordination call with the post acute facility*    This patient was discharged from [facility] to [facility] ([facility])   on 7/13/21. Hospital Discharge Diagnosis per Dr. Eric Todd:    Hypertensive urgency, resolved  Hypertensive encephalopathy, improved  YASMANY on CKD 3, multifactorial - diastolic heart failure, hypertensive emergency  Hypertension  DM2, A1c 5.9%  Hyperlipidemia  Cholelithiasis without evidence for cholecystitis  Left renal cyst  Anxiety  Advanced age        Current Facility: [facility] ([facility])  Anticipated Discharge Date: TBD  Facility Nursing Update: no new orders  Facility Social Work Update:  Pt was living alone  Bundle Program Status: none   Upper Extremity Assistance: Stand by Assist - Presence and Cueing  Lower Extremity Assistance: Mod/Max Assistance  Bed Mobility: Mod/Max Assistance  Transfers: Mod/Max Assistance  Ambulation: Maximum Assistance  How far (in feet) is the patient ambulating?  8 steps and then collapses  Device Used: max assist and walker  Barriers to Discharge: was living alone, nephew involved in care  Other: plan to transition to LTC           Other: family considering transition to LTC

## 2021-07-30 NOTE — PROGRESS NOTES
Post Acute Facility Update     *The information contained in this note was received during a weekly care coordination call with the post acute facility*    This patient was discharged from [facility] to [facility] ([facility])   on 7/13/21. Hospital Discharge Diagnosis per Dr. Patton Pass:    Hypertensive urgency, resolved  Hypertensive encephalopathy, improved  YASMANY on CKD 3, multifactorial - diastolic heart failure, hypertensive emergency  Hypertension  DM2, A1c 5.9%  Hyperlipidemia  Cholelithiasis without evidence for cholecystitis  Left renal cyst  Anxiety  Advanced age        Current Facility: [facility] ([facility])  Anticipated Discharge Date: n/a, pt has transitioned to [address] [place] [address] Nursing Update: no new orders  Facility Social Work Update:  Pt was living alone  Bundle Program Status: none   Upper Extremity Assistance: Stand by Assist - Presence and Cueing  Lower Extremity Assistance: Mod/Max Assistance  Bed Mobility: Mod/Max Assistance  Transfers: Mod/Max Assistance  Ambulation: Maximum Assistance  How far (in feet) is the patient ambulating? 8 steps and then collapses  Device Used: max assist and walker  Barriers to Discharge: was living alone, nephew involved in care       Community Care Team will sign off at this time. Medications were not reconciled and general patient assessment was not completed during this skilled nursing facility outreach.

## 2021-08-02 NOTE — PROGRESS NOTES
Physician Progress Note      PATIENT:               Moni Padilla  CSN #:                  [id number]  :                       1934  ADMIT DATE:       7/3/2021 10:01 AM  DISCH DATE:        2021 5:45 PM  RESPONDING  PROVIDER #:        Ed Davila MD          QUERY TEXT:    Dear hospitalist,    Pt admitted with hyptertensive. urgency  Noted documentation of acute on chronic diastolic CHF in H&P on 9138 per Dr. Shamika Delacruz. If possible, please document in progress notes and discharge summary:    The medical record reflects the following:  Risk Factors: HTN, YASMAYN, DM, CKD 3b    Clinical Indicators:  \"Acute on chronic CHF: BNP 2329\"  per Dr. Shamika Delacruz, H&P. \"Elevated BNP without evidence of pulmonary edema on CXR 7/3/2021. Echo 2021: EF 50-55% with global hypokinesis with preserved septal function, normal LV cavity size, mild concentric LVH, normal RV size with low-normal function, no significant valvular disease\"  per Dr. Jose Manuel Lynch, consult note, 2021. Treatment: furosemide 40mg IV x1 then furosemide 40mg po x1 then furosemide 20mg po daily for duration of hospital stay    Thank you,  Frandy Drummond RN, [facility]  Office:  [phone]  Options provided:  -- Acute on chronic diastolic CHF confirmed POA : Acute on chronic diastolic CHF is confirmed POA. -- Chronic diastolic CHF POA is clinically significant  -- Other - I will add my own diagnosis  -- Disagree - Not applicable / Not valid  -- Disagree - Clinically unable to determine / Unknown  -- Refer to Clinical Documentation Reviewer    PROVIDER RESPONSE TEXT:    Acute on chronic diastolic CHF confirmed POA : Acute on chronic diastolic CHF is confirmed POA. Query created by:  Monisha Shoemaker on 2021 8:51 AM      Electronically signed by:  Ed Davila MD 2021 4:57 PM

## 2021-09-29 PROBLEM — A41.9 SEPSIS (HCC): Status: ACTIVE | Noted: 2021-01-01

## 2021-09-29 NOTE — ED PROVIDER NOTES
EMERGENCY DEPARTMENT HISTORY AND PHYSICAL EXAM      Date: 9/29/2021  Patient Name: Gris Kennedy    History of Presenting Illness     Chief Complaint   Patient presents with    Altered mental status       History (Context): Gris Kennedy is a 80 y.o. female with a past medical history significant for anxiety, arthritis, diabetes, hypertension, comes into the ED today due to altered mental status and dehydration. Patient coming from nursing facility. Per EMS patient was found to not be at her normal baseline. Normally able to verbalize and answer questions appropriately. Staff there felt that patient appeared to be dehydrated and was no longer tolerating p.o. intake. Upon arrival patient nonverbal.  History and review of systems limited secondary to patient's clinical condition. PCP: Joyce Pickett MD    Current Facility-Administered Medications   Medication Dose Route Frequency Provider Last Rate Last Admin    lactated ringers bolus infusion 500 mL  500 mL IntraVENous ONCE Debi Barbosa DO         Current Outpatient Medications   Medication Sig Dispense Refill    ergocalciferol (Vitamin D2) 1,250 mcg (50,000 unit) capsule Take 1 Capsule by mouth every Sunday. 4 Capsule 0    losartan (COZAAR) 50 mg tablet Take 1 Tablet by mouth daily. 30 Tablet 0    metoprolol tartrate (LOPRESSOR) 25 mg tablet Take 1 Tablet by mouth two (2) times a day. 60 Tablet 0    polyethylene glycol (Miralax) 17 gram/dose powder Take 17 g by mouth daily. 1 tablespoon with 8 oz of water daily 117 g 0    furosemide (LASIX) 20 mg tablet Take 1 Tablet by mouth daily. 30 Tablet 0    gabapentin (NEURONTIN) 300 mg capsule Take 1 Capsule by mouth two (2) times a day. Max Daily Amount: 600 mg. 6 Capsule 0    solifenacin (VESICARE) 10 mg tablet Take 1 Tab by mouth daily.  [address] Tab [address]       Past History     Past Medical History:   Past Medical History:   Diagnosis Date    Anxiety     Arthritis     Diabetes ([facility] [address]     Edema of extremities     Hematologic disorder     Hepatitis C carrier ([facility] [address]     Hypercholesterolemia     Hypertension     Insomnia     Labyrinthitis     Menopause     Primary osteoarthritis of knees, bilateral     Urge incontinence     Vitamin D deficiency        Past Surgical History:  Past Surgical History:   Procedure Laterality Date    HX HYSTERECTOMY  36    HX OTHER SURGICAL  2007    Lap Band for weight loss       Family History:  Family History   Problem Relation Age of Onset    Breast Cancer Sister 80    Ovarian Cancer Sister 33        33s       Social History:   Social History     Tobacco Use    Smoking status: Never Smoker    Smokeless tobacco: Never Used   Substance Use Topics    Alcohol use: No    Drug use: No       Allergies: Allergies   Allergen Reactions    Contrast Dye [Iodine] Other (comments)     Neurological reaction       PMH, PSH, family history, social history, allergies reviewed with the patient with significant items noted above. Review of Systems   Review of Systems   Unable to perform ROS: Mental status change   Constitutional: Positive for activity change and appetite change. Neurological:        AMS         Physical Exam   There were no vitals filed for this visit. Physical Exam  Vitals and nursing note reviewed. Constitutional:       General: She is not in acute distress. Appearance: Normal appearance. She is not ill-appearing or toxic-appearing. Comments: Fatigued   HENT:      Head: Atraumatic. Comments: Small abrasion to the left temple       Mouth/Throat:      Mouth: Mucous membranes are dry. Eyes:      General: No scleral icterus. Conjunctiva/sclera: Conjunctivae normal.      Comments: Pinpoint pupils b/l   Cardiovascular:      Rate and Rhythm: Normal rate and regular rhythm. Comments: Normal peripheral perfusion  Pulmonary:      Effort: Pulmonary effort is normal.      Breath sounds: Normal breath sounds.    Abdominal:      General: There is no distension. Palpations: Abdomen is soft. Tenderness: There is abdominal tenderness (mild ttp to the lower quadrants). There is no guarding or rebound. Musculoskeletal:         General: No deformity. Normal range of motion. Cervical back: Normal range of motion and neck supple. Skin:     General: Skin is warm and dry. Findings: No rash. Neurological:      Mental Status: She is confused. GCS: GCS eye subscore is 1. GCS verbal subscore is 2. GCS motor subscore is 4. Psychiatric:         Mood and Affect: Mood normal.         Thought Content: Thought content normal.         Diagnostic Study Results     Labs -   No results found for this or any previous visit (from the past 12 hour(s)). Labs Reviewed   CULTURE, BLOOD   CULTURE, BLOOD   CULTURE, URINE   CBC WITH AUTOMATED DIFF   PROTHROMBIN TIME + INR   METABOLIC PANEL, COMPREHENSIVE   TROPONIN I   NT-PRO BNP   LIPASE   LACTIC ACID   MAGNESIUM   URINALYSIS W/ RFLX MICROSCOPIC       Radiologic Studies -   XR CHEST PORT    (Results Pending)     CT Results  (Last 48 hours)    None        CXR Results  (Last 48 hours)    None          The laboratory results, imaging results, and other diagnostic exams were reviewed in the EMR. Medical Decision Making   I am the first provider for this patient. I reviewed the vital signs, available nursing notes, past medical history, past surgical history, family history and social history. Vital Signs-Reviewed the patient's vital signs. ED EKG interpretation:  Rhythm: normal sinus rhythm; and regular . Rate (approx.): 69; Axis: normal; P wave: normal; QRS interval: normal ; ST/T wave: non-specific changes; Other findings: abnormal ekg. This EKG was interpreted by Tucker Velasquez D.O.        Records Reviewed: Personally, on initial evaluation    MDM:   Brando Arenas presents with complaint of altered mental status and decreased p.o. intake  DDX includes but is not limited to: Dehydration, UTI, pneumonia    Patient overall not at her cognitive baseline per EMS. Patient unable to cooperate with history. Will obtain lab work and imaging for further evaluation of patients complaint. Will begin sepsis work-up. Will hold off on antibiotics at this time due to lack of source and normal vital signs. Will continue to monitor and evaluate patient while in the ED. Orders as below:  Orders Placed This Encounter    CULTURE, BLOOD    CULTURE, BLOOD    CULTURE, URINE    XR CHEST PORT    CBC WITH AUTOMATED DIFF    PROTHROMBIN TIME + INR    COMPREHENSIVE METABOLIC PANEL    TROPONIN I    PRO-BNP    LIPASE    LACTIC ACID, PLASMA    LACTIC ACID, PLASMA    MAGNESIUM    URINALYSIS W/ RFLX MICROSCOPIC    lactated ringers bolus infusion 500 mL        ED Course:   ED Course as of Sep 29 1227   Wed Sep 29, 2021   1139 Patient's lab work significant for creatinine 1.61 which is around her baseline, pH 7.29, otherwise labs grossly within normal limits. Patient found to be hypothermic and therefore will begin patient on bear hugger. Will start patient on IV zosyn and IV vancomycin. Will wait for imaging for further disposition. [DV]   7098 Patient CT scan and chest x-ray do not show any obvious acute abnormalities. Will admit patient to medicine for further treatment and evaluation at [facility]. [DV]      ED Course User Index  [DV] Domenic Epperson DO           Procedures:  Procedures        Diagnosis and Disposition     CLINICAL IMPRESSION:  No diagnosis found.   Current Discharge Medication List          Disposition: Transfer to [facility]    Patient condition at time of disposition: Stable      Critical Care Time:   The services I provided to this patient were to treat and/or prevent clinically significant deterioration that could result in the failure of one or more body systems and/or organ systems due to Hypothermia    Services included the following:  -reviewing nursing notes and old charts  -vital sign assessments  -direct patient care  -medication orders and management  -interpreting and reviewing diagnostic studies/labs  -re-evaluations  -documentation time    Aggregate critical care time was 32 minutes, which includes only time during which I was engaged in work directly related to the patient's care as described above, whether I was at bedside or elsewhere in the Emergency Department. It did not include time spent performing other reported procedures or the services of residents, students, nurses, or advance practice providers. Guillermo Chan D.O.    10:45 AM          Dragon Disclaimer     Please note that this dictation was completed with [employer], the computer voice recognition software. Quite often unanticipated grammatical, syntax, homophones, and other interpretive errors are inadvertently transcribed by the computer software. Please disregard these errors. Please excuse any errors that have escaped final proofreading. Guillermo CUELLAR

## 2021-09-29 NOTE — ED NOTES
Received report from off-going-shift RN (Lorenzo),and assumed care of patient. Patient placed on falls precautions. HOB elevated. Frequent toileting in progress. Side rails up x 2. Bed in low position and wheels locked. Patient will be turned q 2 hours with all bony prominences padded. Patient nonverbal and does not oppen eyes ; moans lightly and slightly opens eyes with painful stimuli or turn/repositioning. Grimaces when suctioned orally. Call bell within reach. Will continue to monitor. Bear hugger off. Per report, patient recently diagnosed with alzheimers and progressively gotten worse in past 4 months per her grandson.  Noted history orf dementia in progress notes from [address]   Past Medical History:   Diagnosis Date    Anxiety     Arthritis     Dementia ([facility] [address]     Diabetes ([address]     Edema of extremities     Falls     Hematologic disorder     Hepatitis C carrier ([facility] [address]     Hypercholesterolemia     Hypertension     Insomnia     Labyrinthitis     Menopause     Primary osteoarthritis of knees, bilateral     Urge incontinence     Vitamin D deficiency        Past Surgical History:   Procedure Laterality Date    HX HYSTERECTOMY  1980    HX OTHER SURGICAL  2007    Lap Band for weight loss

## 2021-09-29 NOTE — ED TRIAGE NOTES
Pt brought in via EMS from [facility]. Pt has had altered mental status for multiple days. Per nursing home staff pt has not been eating or drinking. Pt alert but will not answer any questions.

## 2021-09-29 NOTE — Clinical Note
Status[de-identified] INPATIENT [101]   Type of Bed: Stepdown [17]   Cardiac Monitoring Required?: Yes   Inpatient Hospitalization Certified Necessary for the Following Reasons: 3.  Patient receiving treatment that can only be provided in an inpatient setting (further clarification in H&P documentation)   Admitting Diagnosis: Sepsis [facility]) [[id number]]   Admitting Physician: Surendra Nolan [[id number]]   Attending Physician: Surendra Nolan [[id number]]   Estimated Length of Stay: 3-4 Midnights   Discharge Plan[de-identified] Extended Care Facility (e.g. Adult Home, Nursing Home, etc.)

## 2021-09-29 NOTE — ED NOTES
Pt rectal temp 94.7, pt placed on bear hugger. Pt continues to be alert to voice, and responds to pain. Pt has abx running. Will recheck pt temp.

## 2021-09-30 PROBLEM — I45.5 SINUS PAUSE: Status: ACTIVE | Noted: 2021-01-01

## 2021-09-30 NOTE — PROGRESS NOTES
1130- Patient arrived from [facility] ED via [address] transport. In route to our facility transport team noted 1 pause which resolved with sternal rub. At present patient opens eyes to painful stimuli, non-verbal and not following commands. Pupils 3-4 mm round and reactive. RR labored with stimuli. On room air patient saturations 100%. Pt SR 60's without ectopy. Pacing pads on as back-up. Pt hypertensive with blood pressure 661-880 systolic. Belly soft, non-distended. Huggins catheter in place and draining. Unable to obtain oral or axillary temp as patient does not cooperate/follow commands. 1200- Per cardiology continue to monitor patient. No plan to place temporary pacer at this time. Treat HTN with nitro paste. Echo to be completed today. 1240- Pt had 15 second pause P waves seen without QRS. With sternal rub patient returned to SR rate 60's. Cardiology called, patient placed on lifepack with rate set at 40.     1345- Rectal probe inserted. Temp 92 F. Warming blanket applied to patient. 1645- Palliative care at bedside. Pt to be DNR/ DNI.     1800- Pt slowly rewarming. Temp now 94.8 rectally. Pt nods to some questions but does not follow commands. Pt remains on transcutaneous pacing at a back-up rate of 40 @ 60 mV. VS otherwise stable. 1900- Report given to [place] RN.

## 2021-09-30 NOTE — ED NOTES
Report given to oncoming shift RN Radha June), including  all care previously given. Addressed all questions asked by oncoming RN.

## 2021-09-30 NOTE — ED NOTES
Pt's son attempted to be called at this time to inform him of the pt's transfer to [facility] ED. Pt's son unable to be reached.

## 2021-09-30 NOTE — ED NOTES
Pt remains on bear hugger, pt responsive to painful stimuli. Huggins placed for adequate fluid measurement.

## 2021-09-30 NOTE — ED NOTES
Report given to [facility], with [address].  Pt left [facility] ED at this time to be transported to [facility] ED.

## 2021-09-30 NOTE — ED NOTES
Patient noted to ene down 40bpm, then quickly return to to SR with 1st degree AVB; Provider informed. HOB remains elevated. Suctioned orally noting patient grimaces and shrugs shoulders upwards. Pacer pads applied per Provider verbal orders.

## 2021-09-30 NOTE — H&P
Hospitalist Admission History and Physical    NAME:  Amanda Lawson   :   1934   MRN:   [id number]     PCP:  Kaylee Morelos MD  Date/Time:  2021 1:08 PM  Subjective:   CHIEF COMPLAINT: Change in mental status    HISTORY OF PRESENT ILLNESS:     This is a 77-year-old female with past medical history of hypertension and diabetes who presented to the [facility] ED with a change in mental status. Patient resides at a care facility and was noted to be having a change in mental status and there was concern for dehydration. While at [facility] ED patient was noted to be hypothermic and there was concern for sepsis of unclear source. Patient was started on empiric antibiotics. CT of the chest abdomen pelvis was done which showed some mild mediastinal lymphadenopathy and a right thyroid nodule and some cholelithiasis. Patient was also noted to have some low blood sugars and was started on D5 at [facility]. Overnight patient was noted to have frequent prolonged pauses and after talking to cardiology [facility] When I saw the patient she is laying in bed. Patient is awake and confused and follows some simple commands. Patient is noted to be able to move all 4 extremities.   Unable to obtain any further details as patient is confused        Past Medical History:   Diagnosis Date    Anxiety     Arthritis     Dementia ([address]     Diabetes ([facility] [address]     Edema of extremities     Falls     Hematologic disorder     Hepatitis C carrier ([facility]     Hypercholesterolemia     Hypertension     Insomnia     Labyrinthitis     Menopause     Primary osteoarthritis of knees, bilateral     Urge incontinence     Vitamin D deficiency         Past Surgical History:   Procedure Laterality Date    HX HYSTERECTOMY      HX OTHER SURGICAL      Lap Band for weight loss       Social History     Tobacco Use    Smoking status: Never Smoker    Smokeless tobacco: Never Used   Substance Use Topics    Alcohol use: No        Family History   Problem Relation Age of Onset    Breast Cancer Sister 80    Ovarian Cancer Sister 33        33s        Allergies   Allergen Reactions    Contrast Dye [Iodine] Other (comments)     Neurological reaction        Prior to Admission Medications   Prescriptions Last Dose Informant Patient Reported? Taking?   ergocalciferol (Vitamin D2) 1,250 mcg (50,000 unit) capsule   No No   Sig: Take 1 Capsule by mouth every . furosemide (LASIX) 20 mg tablet   No No   Sig: Take 1 Tablet by mouth daily. gabapentin (NEURONTIN) 300 mg capsule   No No   Sig: Take 1 Capsule by mouth two (2) times a day. Max Daily Amount: 600 mg.   losartan (COZAAR) 50 mg tablet   No No   Sig: Take 1 Tablet by mouth daily. metoprolol tartrate (LOPRESSOR) 25 mg tablet   No No   Sig: Take 1 Tablet by mouth two (2) times a day. polyethylene glycol (Miralax) 17 gram/dose powder   No No   Sig: Take 17 g by mouth daily. 1 tablespoon with 8 oz of water daily   solifenacin (VESICARE) 10 mg tablet   No No   Sig: Take 1 Tab by mouth daily. Facility-Administered Medications: None       REVIEW OF SYSTEMS:     [] Unable to obtain  ROS due to  [x]mental status change    Objective:   VITALS:    Visit Vitals  BP (!) 197/87   Pulse 63   Temp 97.8 °F (36.6 °C)   Resp 22   Ht 5' 4\" (1.626 m)   Wt 78.5 kg (173 lb)   SpO2 100%   BMI 29.70 kg/m²     Temp (24hrs), Av °F (36.1 °C), Min:94.6 °F (34.8 °C), Max:99.2 °F (37.3 °C)      PHYSICAL EXAM:   General:    Lying in bed in no acute distress. Confused  HEENT:  Pupils equal.  Sclera anicteric. Conjunctiva pink. Mucous membranes                           Moist, no ear or nasal discharge  Neck:  Supple. Trachea midline. No accessory muscle use. No thyromegaly. No jugular venous distention, no carotid bruit  CV:                  Regular rate and rhythm.  S1S2+  Lungs:   Clear to auscultation bilaterally. No Wheezing or Rhonchi. No rales. Abdomen:   Soft, non-tender. Not distended. Bowel sounds normal.   Extremities: No cyanosis. No edema. Pulses 1+ b/l  Neurologic: Patient is awake and confused. Patient follows some commands. Patient is able to move all 4 extremities  Skin:                Warm and dry. No rashes. LAB DATA REVIEWED:    No components found for: [place]  Recent Labs     09/30/21  0215 09/29/21  1045   * 145   K 4.1 4.3   * 112*   CO2 22 27   BUN 21* 23*   CREA 1.51* 1.61*   GLU 62* 74   CA 8.4* 9.3   ALB  --  3.2*   WBC 4.8 4.9   HGB 11.1* 13.0   HCT 32.8* 39.2    276         No results found for this visit on 09/29/21 (from the past 12 hour(s)). CT Results  (Last 48 hours)               09/30/21 0837  CT CHEST ABD PELV WO CONT Final result    Impression:      1. Mild mediastinal lymphadenopathy. 2. Right thyroid nodule can be assessed with ultrasound. 3. Cholelithiasis. 4. Accumulation of fluid and gas in the moderate hiatal hernia/gastric pouch   proximal to laparoscopic gastric band, suggesting delayed flow through the band   versus reflux. No distention of the more proximal esophagus to indicate true   obstruction. 5. Moderate colonic stool burden without clear evidence of obstruction. 6. Other incidental chronic CT findings as above. Narrative:  CT CHEST, ABDOMEN AND PELVIS WITHOUT ENHANCEMENT       INDICATION: Altered mental status. Unclear source of possible sepsis. TECHNIQUE: Axial images obtained of the chest, abdomen and pelvis without   intravenous contrast. Coronal and sagittal reformatted images were obtained. All CT scans are performed using dose optimization techniques as appropriate to   the performed exam including the following: Automated exposure control,   adjustment of mA and/or kV according to patient size, and use of iterative   reconstructive technique.         COMPARISON: 6/29/2021 CT abdomen and pelvis. CHEST FINDINGS:   Lung: Mild linear dependent left lower lobe atelectasis. Pleura: Unremarkable. Heart: No effusion. Vessels: Arterial wall calcifications. No aortic aneurysm. Lymph Nodes: Enlarged subcarinal 15 mm lymph node and precarinal 10 mm lymph   node. Thyroid: Limited by motion artifact. 15 mm peripherally calcified right thyroid   nodule, limited by motion artifact. ABDOMEN/PELVIS FINDINGS:   Limited by respiratory motion and other overlying metallic device artifact. Liver: Unremarkable. Biliary: Cholelithiasis. Spleen: Unremarkable. Pancreas: Unremarkable. Adrenal Glands: Stable mild thickening bilaterally. Kidneys: Chronic 2 cm left parapelvic cyst.   Bladder/ Pelvic Organs: Bladder is collapsed and limited due to Huggins catheter. Hysterectomy       Stomach/duodenum: Laparoscopic gastric band. Fluid and gas accumulation within   the gastric pouch/mild-moderate hiatal hernia proximal to the band. Other bowel: Moderate colonic stool, greatest at the rectum without findings   specific for obstruction. Normal appendix. No dilated small bowel. Peritoneum/ Retroperitoneum: Unremarkable. Lymph Nodes: Unremarkable. Vessels: Arterial wall calcifications without aortic aneurysm. Bones/Soft tissues: No acute findings. Degenerative disc disease along thoracic   and lumbar spine. Stable grade I anterolisthesis at L4-L5 associated with facet   hypertrophy and spinal stenosis. 09/29/21 1155  CT HEAD WO CONT Final result    Impression:  1. No hemorrhage identified. 2. Moderate burden of presumed hemispheric small vessel disease change for age. Minimal volume loss. Narrative:  CT HEAD UNENHANCED       CPT code: 74922       INDICATION: Altered mental status times several days.        TECHNIQUE: 5 mm collimation axial images obtained from the skull base through   the vertex without administration of nonionic intravenous contrast. All CT scans at this facility are performed using dose optimization technique as   appropriate to this specific exam, to include automated exposure control,   adjustment of the mA and/or KP according to patient size or use of iterative   reconstruction techniques. COMPARISON: Prior CT 6/25/2021       FINDINGS:    Study moderately motion degraded. Series repeated, still with motion artifact. Patchy low attenuation throughout the periventricular white matter and deep   hemispheric white matter bilaterally, distribution most consistent with chronic   ischemic small vessel disease change. No evidence for acute infarct involving any major vascular distribution. MRI would be much more sensitive for the detection of infarct or ischemia in the   acute setting given background disease. No parenchymal hemorrhage identified. No extra-axial fluid collections. Ventricles are prominent in volume, but symmetric and similar to the prior exam.   No midline shift of structures. Calvarium intact to the extent included. Heart included paranasal sinuses appear clear. Assessment/Plan:      Acute encephalopathy, likely metabolic   Hypoglycemia  Sinus pauses with bradycardia  Hypothermia  Concern for sepsis of unclear source  Dehydration  Uncontrolled hypertension, hypertensive urgency  Right thyroid nodule on CT scan  Mild mediastinal lymphadenopathy on CT scan  Cholelithiasis on CT scan   ___________________________________________________  PLAN:    Patient has been admitted to cardiac ICU. Cardiology has been consulted. Continue external pacemaker at bedside with pacemaker pads on patient. Continue IV fluids with D5, monitor sugars  Bear hugger for hypothermia  Broad-spectrum antibiotics for possible sepsis. Follow culture. ID consulted  Neurology consulted and saw the patient at bedside with me. Judicious management of blood pressure.     Repeat ABG looks reassuring  Discussed with RN. I discussed with patient's nephew over the phone I discussed with the cardiology APC. I discussed with the ID physician.   I discussed with the neurologist  Patient is a full code  Palliative care consulted      Prophylaxis:  []Lovenox  []Coumadin  []Hep SQ  [x]SCDs  []H2B/PPI    Discussed Code Status:    [x]Full Code      []DNR     ___________________________________________________    Care Plan discussed with:    [x]Patient   []Family    []ED Care Manager  []ED Doc   [x]Specialist : Neurologist, ID, cardiology APC    Total Time Coordinating Admission:  70    minutes    []Total Critical Care Time:     ___________________________________________________  Admitting Physician: Gabriela Forrester MD

## 2021-09-30 NOTE — PROGRESS NOTES
Spoke to [place] [phone] and updated him regarding patients care. I discussed level of care and nephew wishes to talk to other family members before making a final decision.  For now he wishes for patient to be a full code

## 2021-09-30 NOTE — CONSULTS
NEUROLOGY CONSULT NOTE    Patient ID:  Eduardo Good  [id number]  80 y.o.  1934    Date of Consultation:  September 30, 2021    Referring Physician: Mallika Villalobos MD     Reason for Consultation: Altered mental status        Subjective:       History of Present Illness:     Ms Colby Moore is a 66-year-old woman, -American, with a history of dementia, type 2 diabetes, hepatitis C, evaluated today for chief complaints of altered mental status. The patient was brought to [facility] ER from SNF for 9/29/2021 after he told nursing that she was having multiple episodes of being altered, and refusing to eat or drink. At baseline she is able to verbalize and answer questions appropriately. While in the ER, she had recurrent episodes of sinus pause and sinus bradycardia and became hypothermic, and episodes of hypoglycemia with blood glucose this morning 62.the patient  was started on broad-spectrum antibiotic concerning for possible sepsis and cardiology, ID was consulted. CT chest abdomen pelvis showed mediastinal lymphadenopathy, right thyroid nodule and cholelithiasis. Head CT negative for acute intracranial pathology. The patient follows simple commands today. She developed elevated blood pressure in 215 and became again altered, a low-dose of hydralazine was given and then the blood pressure dropped to 120s -130s. Pacer pads were placed, patient however he wakes up when she had the liver. She is receiving broad-spectrum antibiotics concerning for possible sepsis    Family decided that the patient will be placed on DNR and DNI. She is undergoing warming protocol currently . The patient had echo in July 4 and showed a normal ejection fraction. Apparently she had progressive decline over the past 4 months.       Patient Active Problem List    Diagnosis Date Noted    Sinus pause 09/30/2021    Sepsis ([address] 09/29/2021    Hypertensive urgency 07/04/2021    HTN (hypertension), malignant 07/03/2021    Severe obesity ([facility] 02/06/2020    Acute labyrinthitis 10/28/2014    Urge incontinence 03/27/2014     Past Medical History:   Diagnosis Date    Anxiety     Arthritis     Dementia ([facility] [facility]     Diabetes ([facility] [facility]     Edema of extremities     Falls     Hematologic disorder     Hepatitis C carrier (HCC)     Hypercholesterolemia     Hypertension     Insomnia     Labyrinthitis     Menopause     Primary osteoarthritis of knees, bilateral     Urge incontinence     Vitamin D deficiency       Past Surgical History:   Procedure Laterality Date    HX HYSTERECTOMY  1980    HX OTHER SURGICAL  2007    Lap Band for weight loss      Prior to Admission medications    Medication Sig Start Date End Date Taking? Authorizing Provider   ergocalciferol (Vitamin D2) 1,250 mcg (50,000 unit) capsule Take 1 Capsule by mouth every Sunday. 7/18/21   Shahida Hidalgo MD   losartan (COZAAR) 50 mg tablet Take 1 Tablet by mouth daily. 7/13/21   Shahida Hidalgo MD   metoprolol tartrate (LOPRESSOR) 25 mg tablet Take 1 Tablet by mouth two (2) times a day. 7/13/21   Shahida Hidalgo MD   polyethylene glycol (Miralax) 17 gram/dose powder Take 17 g by mouth daily. 1 tablespoon with 8 oz of water daily 7/13/21   Shahida Hidalgo MD   furosemide (LASIX) 20 mg tablet Take 1 Tablet by mouth daily. 7/13/21   Shahida Hidalgo MD   gabapentin (NEURONTIN) 300 mg capsule Take 1 Capsule by mouth two (2) times a day. Max Daily Amount: 600 mg. 7/13/21   Shahida Hidalgo MD   solifenacin (VESICARE) 10 mg tablet Take 1 Tab by mouth daily.  4/20/16   Petra Prado MD     Allergies   Allergen Reactions    Contrast Dye [Iodine] Other (comments)     Neurological reaction      Social History     Tobacco Use    Smoking status: Never Smoker    Smokeless tobacco: Never Used   Substance Use Topics    Alcohol use: No      Family History   Problem Relation Age of Onset    Breast Cancer Sister 80    Ovarian Cancer Sister 33        33s Review of Systems    Review of systems not obtained due to patient factors. Objective:     Patient Vitals for the past 8 hrs:   BP Temp Pulse Resp SpO2 Height Weight   09/30/21 1700 (!) 115/100 (!) 94.3 °F (34.6 °C) 78 19 100 %     09/30/21 1630 136/76 (!) 93.7 °F (34.3 °C) 73 18 100 %     09/30/21 1600 127/71 (!) 93.2 °F (34 °C) 67 16 100 %     09/30/21 1530 130/74 (!) 92.7 °F (33.7 °C) 68 16 100 %     09/30/21 1500 123/66 (!) 92.3 °F (33.5 °C) 61 29 100 %     09/30/21 1446 120/80 (!) 92.3 °F (33.5 °C) (!) 53 (!) 32 100 %     09/30/21 1400 135/73 (!) 92.7 °F (33.7 °C) 69 20 100 %     09/30/21 1346  (!) 92.5 °F (33.6 °C) 71 25 100 %     09/30/21 1300 (!) 201/102  66 30 100 %     09/30/21 1229 (!) 203/94  (!) 0 19 100 %     09/30/21 1212 (!) 197/87     5' 4\" (1.626 m) 78.5 kg (173 lb)   09/30/21 1200 (!) 197/87  63 22 100 %     09/30/21 1150 (!) 180/92  68 25 100 %     09/30/21 1141   (!) 47 21 100 %     09/30/21 1140 (!) 181/135  67 22 100 %     09/30/21 1137 (!) 180/92  69 22 100 %     09/30/21 1133 (!) 215/95  72 27 100 %     09/30/21 1051  97.8 °F (36.6 °C)        09/30/21 1030   69 18 100 %     09/30/21 1000 (!) 155/79  67 22 100 %         General Exam  The patient appears ill, obtunded, but able to wake up at loud voice and painful stimuli. HEENT: Normocephalic, atraumatic, Sclera anicteric, normal conjunctiva  Mucous membranes: normal color and hydration     CV: No carotid bruits,   Heart: regular to rate and rhythm. Bradycardic. No murmurs     RESP: Distant breathing sounds. Neurologic Exam:    MENTAL STATUS:     The patient appears obtunded, waking up to last voice in painful stimuli. Follows only simple commands. CRANIAL NERVES:   II Pupils are midline, symmetric, both reactive to light   III, IV, VI  disconjugate eye movements, no nystagmus  V corneals are present. VII  Face is symmetrical.   VIII - Hearing is present.    IX, X, Driscilla Long rises symmetrically. Gag is present. Tongue is in the midline. XI - Shoulder shrugging and head turning intact    MOTOR:          Tone is normal.  Muscle bulk is normal.  Globally weak but moves all 4 extremities symmetric. No involuntary movements    SENSATION:  Sensory examination is intact to pinprick, light touch and position sense testing. REFLEXES: 1+ and symmetrical. Plantars are down going. COORDINATION: Cerebellar examination reveals no gross ataxia or dysmetria. GAIT: Gait is not tested at this time.       Data Review:    Recent Results (from the past 24 hour(s))   EKG, 12 LEAD, SUBSEQUENT    Collection Time: 09/30/21  1:31 AM   Result Value Ref Range    Ventricular Rate 80 BPM    Atrial Rate 80 BPM    P-R Interval 196 ms    QRS Duration 94 ms    Q-T Interval 410 ms    QTC Calculation (Bezet) 472 ms    Calculated P Axis 75 degrees    Calculated R Axis -28 degrees    Calculated T Axis 77 degrees    Diagnosis       Normal sinus rhythm  Prolonged QT  Abnormal ECG  When compared with ECG of 29-SEP-2021 10:54,  Nonspecific T wave abnormality, improved in Lateral leads  Confirmed by Abdoul Montalvo MD, --- ([id number]) on 9/30/2021 1:36:03 PM     GLUCOSE, POC    Collection Time: 09/30/21  1:48 AM   Result Value Ref Range    Glucose (POC) 70 70 - 110 mg/dL   TSH 3RD GENERATION    Collection Time: 09/30/21  2:15 AM   Result Value Ref Range    TSH 2.66 0.36 - 3.74 uIU/mL   MAGNESIUM    Collection Time: 09/30/21  2:15 AM   Result Value Ref Range    Magnesium 2.3 1.6 - 2.6 mg/dL   TROPONIN I    Collection Time: 09/30/21  2:15 AM   Result Value Ref Range    Troponin-I, QT <0.02 0.0 - 0.045 NG/ML   NT-PRO BNP    Collection Time: 09/30/21  2:15 AM   Result Value Ref Range    NT pro- 0 - 1,800 PG/ML   CBC WITH AUTOMATED DIFF    Collection Time: 09/30/21  2:15 AM   Result Value Ref Range    WBC 4.8 4.6 - 13.2 K/uL    RBC 3.80 (L) 4.20 - 5.30 M/uL    HGB 11.1 (L) 12.0 - 16.0 g/dL    HCT 32.8 (L) 35.0 - 45.0 %    MCV 86.3 78.0 - 100.0 FL    MCH 29.2 24.0 - 34.0 PG    MCHC 33.8 31.0 - 37.0 g/dL    RDW 15.4 (H) 11.6 - 14.5 %    PLATELET 592 679 - 632 K/uL    MPV 10.2 9.2 - 11.8 FL    NEUTROPHILS 57 40 - 73 %    LYMPHOCYTES 28 21 - 52 %    MONOCYTES 9 3 - 10 %    EOSINOPHILS 3 0 - 5 %    BASOPHILS 1 0 - 2 %    ABS. NEUTROPHILS 2.7 1.8 - 8.0 K/UL    ABS. LYMPHOCYTES 1.4 0.9 - 3.6 K/UL    ABS. MONOCYTES 0.4 0.05 - 1.2 K/UL    ABS. EOSINOPHILS 0.1 0.0 - 0.4 K/UL    ABS.  BASOPHILS 0.0 0.0 - 0.1 K/UL    DF AUTOMATED     METABOLIC PANEL, BASIC    Collection Time: 09/30/21  2:15 AM   Result Value Ref Range    Sodium 146 (H) 136 - 145 mmol/L    Potassium 4.1 3.5 - 5.5 mmol/L    Chloride 116 (H) 100 - 111 mmol/L    CO2 22 21 - 32 mmol/L    Anion gap 8 3.0 - 18 mmol/L    Glucose 62 (L) 74 - 99 mg/dL    BUN 21 (H) 7.0 - 18 MG/DL    Creatinine 1.51 (H) 0.6 - 1.3 MG/DL    BUN/Creatinine ratio 14 12 - 20      GFR est AA 40 (L) >60 ml/min/1.73m2    GFR est non-AA 33 (L) >60 ml/min/1.73m2    Calcium 8.4 (L) 8.5 - 10.1 MG/DL   GLUCOSE, POC    Collection Time: 09/30/21  3:04 AM   Result Value Ref Range    Glucose (POC) 116 (H) 70 - 110 mg/dL   DRUG SCREEN, URINE    Collection Time: 09/30/21  5:17 AM   Result Value Ref Range    BENZODIAZEPINES Negative NEG      BARBITURATES Negative NEG      THC (TH-CANNABINOL) Negative NEG      OPIATES Negative NEG      PCP(PHENCYCLIDINE) Negative NEG      COCAINE Negative NEG      AMPHETAMINES Negative NEG      METHADONE Negative NEG      HDSCOM (NOTE)    GLUCOSE, POC    Collection Time: 09/30/21  5:21 AM   Result Value Ref Range    Glucose (POC) 99 70 - 110 mg/dL   AMMONIA    Collection Time: 09/30/21  8:00 AM   Result Value Ref Range    Ammonia 21 11 - 32 UMOL/L   PROCALCITONIN    Collection Time: 09/30/21  8:00 AM   Result Value Ref Range    Procalcitonin <0.05 ng/mL   CORTISOL    Collection Time: 09/30/21  8:00 AM   Result Value Ref Range    Cortisol, random 15.1 ug/dL   GLUCOSE, POC    Collection Time: 09/30/21  9:12 AM   Result Value Ref Range    Glucose (POC) 70 70 - 110 mg/dL   GLUCOSE, POC    Collection Time: 09/30/21 11:40 AM   Result Value Ref Range    Glucose (POC) 79 70 - 110 mg/dL   GLUCOSE, POC    Collection Time: 09/30/21  1:03 PM   Result Value Ref Range    Glucose (POC) 93 70 - 110 mg/dL   BLOOD GAS,CHEM8,LACTIC ACID POC    Collection Time: 09/30/21  1:14 PM   Result Value Ref Range    pH (POC) 7.36 7.35 - 7.45      pCO2 (POC) 41.4 35.0 - 45.0 MMHG    pO2 (POC) 99 80 - 100 MMHG    Calcium, ionized (POC) 1.26 1.12 - 1.32 mmol/L    Base deficit (POC) 1.8 mmol/L    HCO3 (POC) 23.6 22 - 26 MMOL/L    CO2, POC 24 19 - 24 MMOL/L    O2 SAT 98 %    Patient temp. 97.8      Sample source ARTERIAL      SITE RIGHT RADIAL      EASTON'S TEST Positive      Device: ROOM AIR      Performed by Mateo Olvera     Sodium (POC) 145 136 - 145 mmol/L    Potassium (POC) 3.8 3.5 - 5.1 mmol/L    Glucose (POC) 96 65 - 100 mg/dL    Creatinine (POC) 1.63 (H) 0.6 - 1.3 mg/dL    Lactic Acid (POC) 0.68 0.40 - 2.00 mmol/L    Chloride (POC) 114 (H) 98 - 107 mmol/L    Anion gap, POC 8 (L) 10 - 20      GFRAA, POC 36 (L) >60 ml/min/1.73m2    GFRNA, POC 30 (L) >60 ml/min/1.73m2   ECHO ADULT FOLLOW-UP OR LIMITED    Collection Time: 09/30/21  1:32 PM   Result Value Ref Range    IVSd 1.04 (A) 0.60 - 0.90 cm    LVIDd 4.25 3.90 - 5.30 cm    LVIDs 2.99 cm    LVPWd 1.26 (A) 0.60 - 0.90 cm    Triscuspid Valve Regurgitation Peak Gradient 19.03 mmHg    TR Max Velocity 218.07 cm/s    LV Mass .5 67.0 - 162.0 g    LV Mass AL Index 92.7 43.0 - 33.2 g/m2   METABOLIC PANEL, COMPREHENSIVE    Collection Time: 09/30/21  2:15 PM   Result Value Ref Range    Sodium 145 136 - 145 mmol/L    Potassium 4.0 3.5 - 5.5 mmol/L    Chloride 115 (H) 100 - 111 mmol/L    CO2 22 21 - 32 mmol/L    Anion gap 8 3.0 - 18 mmol/L    Glucose 99 74 - 99 mg/dL    BUN 20 (H) 7.0 - 18 MG/DL    Creatinine 1.53 (H) 0.6 - 1.3 MG/DL    BUN/Creatinine ratio 13 12 - 20      GFR est AA 39 (L) >60 ml/min/1.73m2    GFR est non-AA 32 (L) >60 ml/min/1.73m2    Calcium 8.9 8.5 - 10.1 MG/DL    Bilirubin, total 0.5 0.2 - 1.0 MG/DL    ALT (SGPT) 18 13 - 56 U/L    AST (SGOT) 19 10 - 38 U/L    Alk. phosphatase 64 45 - 117 U/L    Protein, total 6.7 6.4 - 8.2 g/dL    Albumin 2.6 (L) 3.4 - 5.0 g/dL    Globulin 4.1 (H) 2.0 - 4.0 g/dL    A-G Ratio 0.6 (L) 0.8 - 1.7     MAGNESIUM    Collection Time: 09/30/21  2:15 PM   Result Value Ref Range    Magnesium 2.2 1.6 - 2.6 mg/dL   C REACTIVE PROTEIN, QT    Collection Time: 09/30/21  2:15 PM   Result Value Ref Range    C-Reactive protein 1.2 (H) 0 - 0.3 mg/dL   CARDIAC PANEL,(CK, CKMB & TROPONIN)    Collection Time: 09/30/21  2:15 PM   Result Value Ref Range    CK - MB 2.1 <3.6 ng/ml    CK-MB Index 5.0 (H) 0.0 - 4.0 %    CK 42 26 - 192 U/L    Troponin-I, QT <0.02 0.0 - 0.045 NG/ML   GLUCOSE, POC    Collection Time: 09/30/21  5:42 PM   Result Value Ref Range    Glucose (POC) 105 70 - 110 mg/dL         Radiology studies:   Head CT negative for acute intracranial pathology, no intracranial hemorrhage, evidence of small vessel ischemic changes bilaterally    80 minutes were spent on the patient of which more than 50% was spent in coordination of care and counseling (time spent with patient/family face to face, physical exam, reviewing laboratory and imaging investigations, speaking with physicians and nursing staff involved in this patient's care)    Assessment: This is a 22-year-old woman, with history of Alzheimer disease, hypertension, diabetes, hyperlipidemia, with overall decline over the past 4 months, evaluated today for encephalopathy. On exam the patient appeared almost obtunded however she follows commands. I do not suspect seizure etiology. I am rather concerned that her presentation is due to overall failure to thrive/new onset cardiomyopathy/hypertensive encephalopathy.    I agree with the family decision to continue at this time with palliative care due to poor functional status at baseline.     Plan:     - conservative management from neurological standpoint  -Okay to continue aspirin suppository  -Treat possible underlying infection per ID recommendations  -I will defer the rest of the treatment to palliative care, no other recommendation from neurological point of view, will sign off          Madhavi Blackburn MD  Adult Neurologist  9/30/2021

## 2021-09-30 NOTE — ED NOTES
Informed by RN that patient had an episode of sinus bradycardia that lasted for 1 min  Patient remains lethargic, responsive to painful stimuli, maintaining airway, spontaneous movement  Unclear what patient baseline mental status is  Will repeat labs  EKG :80, NSR, , no ST changes, IL:196 ms; QTC:472ms

## 2021-09-30 NOTE — PROGRESS NOTES
Problem: Falls - Risk of  Goal: *Absence of Falls  Description: Document Uriel Fowler Fall Risk and appropriate interventions in the flowsheet.   Outcome: Progressing Towards Goal  Note: Fall Risk Interventions:       Mentation Interventions: Adequate sleep, hydration, pain control, Reorient patient, More frequent rounding, Room close to nurse's station, Evaluate medications/consider consulting pharmacy    Medication Interventions: Assess postural VS orthostatic hypotension, Evaluate medications/consider consulting pharmacy, Patient to call before getting OOB, Teach patient to arise slowly    Elimination Interventions: Call light in reach, Stay With Me (per policy), Toilet paper/wipes in reach, Toileting schedule/hourly rounds, Patient to call for help with toileting needs    History of Falls Interventions: Door open when patient unattended, Evaluate medications/consider consulting pharmacy, Investigate reason for fall, Room close to nurse's station, Utilize gait belt for transfer/ambulation, Assess for delayed presentation/identification of injury for 48 hrs (comment for end date)

## 2021-09-30 NOTE — ED NOTES
3:16 AM  Patient had another episode of sinus bradycardia in 40s for few seconds. Patient moves spontaneously  Labs unremarkable apart from glucose of 62, given amp of glucose and placed on maintenance D5. Repeat glu: 116  Cr:1.51 chronically elevated  Pads placed on patient.   Will obtain cardiology consult in the AM if further episodes

## 2021-09-30 NOTE — ED NOTES
[facility] called at this time for transport. Pt is to arrive [facility] ED bed 20, for Cardiology to evaluate the pt. Then the pt will be transferred to CVT ICU. This has been discussed with Nursing Supervisor & Maricruz Chacon.

## 2021-09-30 NOTE — ED NOTES
Brief episode of body movements with facial grimacing and increased respirations following Narcan, then quickly returned back to unresponsiveness unless painful stimuli applied. Provider at bedside and aware.

## 2021-09-30 NOTE — ED NOTES
Increased breathing rate and moans at times when turned/repositioned. Jerks left arm and bends arm towards self with painful stimuli.

## 2021-09-30 NOTE — ED NOTES
No further alarms of asystole since leads changed. Will continue to monitor closely. Mouth care given, noting facial grimacing.

## 2021-09-30 NOTE — PROGRESS NOTES
1240 Called to room d/t patient going into asystole several times in a row. [facility]. Dr Yohannes Carrillo called and made aware. Transcutaneous pacer set at 40bpm.  Dr Yohannes Carrillo on floor to see patient.   New orders written/given

## 2021-09-30 NOTE — CONSULTS
Infectious Disease Consultation Note        Reason: Sepsis of unknown etiology    Current abx Prior abx   Zosyn, vancomycin since 9/29/2021      Lines:       Assessment :    80 y.o. female with past medical history significant for dementia, type 2 diabetes, hepatitis C sent to  [facility] ER from [facility] on 9/29/21 due to altered mental status/concern for CVA. Now with altered mental status, bradycardia with sinus pauses, hypothermia    I agree that hypothermia/altered mental status is concerning for sepsis. However after detailed review of labs, exam; clinical probability of sepsis is low. Procalcitonin less than 0.05, no leukocytosis, no hypotension argues against bacterial sepsis    No evidence of hypothyroidism    Progressive decline in the past few months per records. ? Undiagnosed paraneoplastic syndrome    Cardiology follow-up appreciated. Recommendations:    1. Continue Zosyn, vancomycin for now. Will discontinue antibiotics in a.m. if blood cultures remain negative  2. Follow-up cardiology, neurology recommendations  3. Agree with the discussions regarding goals of care with family  4. Consider CT chest/abdomen/pelvis, MRI brain to rule out undiagnosed malignancy, paraneoplastic syndrome if family wishes for aggressive measures        Thank you for consultation request. Above plan was discussed in details with RN, cardiology NP and dr Zoey Ochoa. Please call me if any further questions or concerns. Will continue to participate in the care of this patient. HPI:    80 y.o. female with past medical history significant for dementia, type 2 diabetes, hepatitis C sent to  [facility] ER from [facility] on 9/29/21 due to altered mental status/concern for CVA. Over the course of her ER stay, pt was found to have sinus pauses lasting 5-10 seconds. She was transferred to CVT ICU for further care. Pt nonverbal, will open eyes on command but not following all commands.     Obtained history from review of records, talking to the hospitalist.  After transfer to [facility]T ICU, patient was noted to have hypothermia, intermittent bradycardia. Cardiology consulted. Transcutaneous pacer pads placed. Patient was started on broad-spectrum antibiotics since admission due to concerns for sepsis. Neurology has been consulted. Have been consulted for further recommendations. Per records, pt normally dependent on others for ADL's except eating, and otherwise noted to have bowel/bladder incontinence. pt with multiple falls recently. No hypotension since admission. No fevers since admission. Procalcitonin less than 0.05            Past Medical History:   Diagnosis Date    Anxiety     Arthritis     [facility]     Diabetes ([facility]     Edema of extremities     Falls     Hematologic disorder     Hepatitis C carrier ([facility]     Hypercholesterolemia     Hypertension     Insomnia     Labyrinthitis     Menopause     Primary osteoarthritis of knees, bilateral     Urge incontinence     Vitamin D deficiency        Past Surgical History:   Procedure Laterality Date    HX HYSTERECTOMY  1980    HX OTHER SURGICAL  2007    Lap Band for weight loss       Current Discharge Medication List      CONTINUE these medications which have NOT CHANGED    Details   ergocalciferol (Vitamin D2) 1,250 mcg (50,000 unit) capsule Take 1 Capsule by mouth every Sunday. Qty: 4 Capsule, Refills: 0      losartan (COZAAR) 50 mg tablet Take 1 Tablet by mouth daily. Qty: 30 Tablet, Refills: 0      metoprolol tartrate (LOPRESSOR) 25 mg tablet Take 1 Tablet by mouth two (2) times a day. Qty: 60 Tablet, Refills: 0      polyethylene glycol (Miralax) 17 gram/dose powder Take 17 g by mouth daily. 1 tablespoon with 8 oz of water daily  Qty: 117 g, Refills: 0      furosemide (LASIX) 20 mg tablet Take 1 Tablet by mouth daily. Qty: 30 Tablet, Refills: 0      gabapentin (NEURONTIN) 300 mg capsule Take 1 Capsule by mouth two (2) times a day. Max Daily Amount: 600 mg.   Qty: 6 Capsule, Refills: 0    Associated Diagnoses: DM type 2 with diabetic peripheral neuropathy (HCC)      solifenacin (VESICARE) 10 mg tablet Take 1 Tab by mouth daily.   Qty: 90 Tab, Refills: 3             Current Facility-Administered Medications   Medication Dose Route Frequency    hydrALAZINE (APRESOLINE) 20 mg/mL injection 10 mg  10 mg IntraVENous Q6H PRN    sodium chloride (NS) flush 5-40 mL  5-40 mL IntraVENous Q8H    sodium chloride (NS) flush 5-40 mL  5-40 mL IntraVENous PRN    acetaminophen (TYLENOL) tablet 650 mg  650 mg Oral Q6H PRN    Or    acetaminophen (TYLENOL) suppository 650 mg  650 mg Rectal Q6H PRN    polyethylene glycol (MIRALAX) packet 17 g  17 g Oral DAILY PRN    [START ON 10/1/2021] famotidine (PF) (PEPCID) 20 mg in 0.9% sodium chloride 10 mL injection  20 mg IntraVENous DAILY    aspirin (ASA) suppository 300 mg  300 mg Rectal DAILY    albuterol-ipratropium (DUO-NEB) 2.5 MG-0.5 MG/3 ML  3 mL Nebulization Q4H PRN    dextrose 5 % - 0.45% NaCl infusion  100 mL/hr IntraVENous CONTINUOUS    nitroglycerin (NITROBID) 2 % ointment 1 Inch  1 Inch Topical Q6H    insulin lispro (HUMALOG) injection   SubCUTAneous Q2H    glucose chewable tablet 16 g  16 g Oral PRN    glucagon (GLUCAGEN) injection 1 mg  1 mg IntraMUSCular PRN    dextrose (D50W) injection syrg 25 g  50 mL IntraVENous PRN    hydrocortisone Sod Succ (PF) (SOLU-CORTEF) injection 100 mg  100 mg IntraVENous Q8H    thiamine (B-1) 100 mg in 0.9% sodium chloride 50 mL IVPB  100 mg IntraVENous BID    vancomycin (VANCOCIN) 750 mg in 0.9% sodium chloride 250 mL (VIAL-MATE)  750 mg IntraVENous Q24H    piperacillin-tazobactam (ZOSYN) 2.25 g in 0.9% sodium chloride (MBP/ADV) 50 mL MBP  2.25 g IntraVENous Q6H       Allergies: Contrast dye [iodine]    Family History   Problem Relation Age of Onset    Breast Cancer Sister 80    Ovarian Cancer Sister 33        33s     Social History     Socioeconomic History    Marital status: UNKNOWN Spouse name: Not on file    Number of children: Not on file    Years of education: Not on file    Highest education level: Not on file   Occupational History    Not on file   Tobacco Use    Smoking status: Never Smoker    Smokeless tobacco: Never Used   Substance and Sexual Activity    Alcohol use: No    Drug use: No    Sexual activity: Not on file   Other Topics Concern    Not on file   Social History Narrative    Not on file     Social Determinants of Health     Financial Resource Strain:     Difficulty of Paying Living Expenses:    Food Insecurity:     Worried About Running Out of Food in the Last Year:     [address] in the Last Year:    Transportation Needs:     Lack of Transportation (Medical):  Lack of Transportation (Non-Medical):    Physical Activity:     Days of Exercise per Week:     Minutes of Exercise per Session:    Stress:     Feeling of Stress :    Social Connections:     Frequency of Communication with Friends and Family:     Frequency of Social Gatherings with Friends and Family:     Attends Yarsanism Services:     Active Member of Clubs or Organizations:     Attends Club or Organization Meetings:     Marital Status:    Intimate Partner Violence:     Fear of Current or Ex-Partner:     Emotionally Abused:     Physically Abused:     Sexually Abused:      Social History     Tobacco Use   Smoking Status Never Smoker   Smokeless Tobacco Never Used        Temp (24hrs), Av.2 °F (35.7 °C), Min:92.3 °F (33.5 °C), Max:99.2 °F (37.3 °C)    Visit Vitals  /80   Pulse (!) 53   Temp (!) 92.3 °F (33.5 °C)   Resp (!) 32   Ht 5' 4\" (1.626 m)   Wt 78.5 kg (173 lb)   SpO2 100%   BMI 29.70 kg/m²       ROS: 12 point ROS obtained in details. Pertinent positives as mentioned in HPI,   otherwise negative    Physical Exam:    General: Well developed, well nourished female laying on the bed/sitting on the  bed AAOx3 in no acute distress.     General:   awake alert and oriented HEENT:  Normocephalic, atraumatic, PERRL, EOMI, no scleral icterus or pallor; no conjunctival hemmohage;  nasal and oral mucous are moist and without evidence of lesions. No thrush. Dentition good. Neck supple, no bruits. Lymph Nodes:   not examined   Lungs:   non-labored, bilateral chest movements equal.    Heart:  Irregular rate; , no edema   Abdomen:  soft, non-distended, active bowel sounds, no hepatomegaly, no splenomegaly. non-tender   Genitourinary:  deferred   Extremities:   no clubbing, cyanosis; no joint effusions or swelling;  muscle mass appropriate for age   Neurologic:  No gross focal sensory abnormalities; does not follow commands - detailed neuro eval. Not feasible                        Skin:  No rash or ulcers noted   Back:  no sacral decubiti per report     Psychiatric:  Calm, unable to assess         Labs: Results:   Chemistry Recent Labs     09/30/21 0215 09/29/21  1045   GLU 62* 74   * 145   K 4.1 4.3   * 112*   CO2 22 27   BUN 21* 23*   CREA 1.51* 1.61*   CA 8.4* 9.3   AGAP 8 6   BUCR 14 14   AP  --  81   TP  --  8.4*   ALB  --  3.2*   GLOB  --  5.2*   AGRAT  --  0.6*      CBC w/Diff Recent Labs     09/30/21 0215 09/29/21  1045   WBC 4.8 4.9   RBC 3.80* 4.52   HGB 11.1* 13.0   HCT 32.8* 39.2    276   GRANS 57 66   LYMPH 28 22   EOS 3 5      Microbiology Recent Labs     09/29/21  1100   CULT NO GROWTH AFTER 16 HOURS  NO GROWTH AFTER 16 HOURS          RADIOLOGY:    All available imaging studies/reports in [facility] care for this admission were reviewed      Disclaimer: Sections of this note are dictated utilizing voice recognition software, which may have resulted in some phonetic based errors in grammar and contents. Even though attempts were made to correct all the mistakes, some may have been missed, and remained in the body of the document. If questions arise, please contact our department.     Dr. Janessa Ragland, Infectious Disease Specialist  [phone]  September 30, 2021  3:03 PM

## 2021-09-30 NOTE — ED NOTES
No further episodes of bradycardia since replacement of leads  Patient was given trial of narcan since noted to have constricted pupils. Still remains lethargic but protects airway.

## 2021-09-30 NOTE — ACP (ADVANCE CARE PLANNING)
[facility] [address] POST (Physician Orders for Scope of Treatment)       Date of conversation: 9/30/2021    Location: [facility]   Length (minutes): 20     Participants:   [x] Patient unable to engage in conversation   [x] Angelina     Name: Vasyl Godinez     Name\"Zamzam Pimentel [place]     Phone Number: [phone]      [facility]® ACP Facilitator: Rich Lees RN, Angela Mares NP     CODE STATUS - DNR/DNI [address] [place] [address] . POST FORM COMPLETED     Conversation Topics     Understanding of Medical Condition/s AND Potential Complications:     Palliative Medicine was consulted for end stage disease and goals of care decision. Pt was seen at bedside by provider, Angela Mares NP. Ms. Aline Gaviria is an 55-year-old female with h/o HTN, DM, dementia and arthritis. Patient resides at  [address] [place] [address]. She was admitted via [facility] ED with changes in her mental status. Pt became hypothermic with temp of 92. Also with developed pauses which required a external pacer placement. Palliative team spoke with extended family members present at bedside and over the phone and provided medical update. Discussed the benefits and burdens of intubation and CPR in the event of respiratory decline or cardiopulmonary arrest in a patient with advanced age and Ms. Honey Petty conditions. All questions were answered. Family members agree to DNR/DNI and to allow for a natural passing when her heart and breathing stop. Medical Team will continue to current course of treatment. Introduced the completion of Physician Order For Scope of Treatment . Forme signed by Angela Mares NP and JEAN PIERRE Martinez.       Needs to discuss with spiritual/Hindu advisor: [] Yes  [x] No    Needs more information about illness and complications:  [] Yes  [x] No    Cardiopulmonary Resuscitation      Order Elected for CPR:  []  Attempt Resuscitation [x]  Do Not Attempt Resuscitation      When NOT in Cardiopulmonary Arrest, Order Elected:      [] Comfort Measures  [x] Limited Additional Interventions  [] Full Interventions    Artificially Administered Nutrition, Order Elected:  NOT ADDRESSED AT THIS TIME.   [] No Feeding Tube   [] Feeding Tube for a defined trial period  [] Feeding Tube long-term if indicated    The following was provided (check all that apply):      Healthcare Decision Information:   [x] Help with Breathing Facts   [] Tube Feeding Facts   [x] CPR Facts      [x] Review of existing Advance Directive-   requested copy from Martha Lozano   [] Assistance with Completion of New Advance Directive   [x] Review of [place] Form       Meeting Outcomes:   [] ACP discussion completed   [x] [place] form completed  [x] [place] prepared for Provider review and signature   [x] Original placed on Chart, if in facility (form to be sent with patient at discharge)  [x] Copy given to healthcare agent    [x] Copy scanned to electronic medical record    If ACP discussion not completed, last interview topic discussed:       Follow-up plan:     [x] Schedule follow-up conversation to continue planning   [] Referred individual to Provider for additional questions/concerns   [x] Advised patient/agent/surrogate to review completed POST form and update if needed with changes in condition, patient preferences or care setting     [] This note routed to one or more involved healthcare providers    Lesley RIVAS, RN, [facility]  Palliative Medicine Inpatient RN  Palliative [facility] Line: [phone]

## 2021-09-30 NOTE — CONSULTS
Cardiology Initial Patient Referral Note    Cardiology referral request from Dr. Dl Ly for evaluation and management/treatment of bradycardia/sinus pauses    Date of  Admission: 9/29/2021 10:43 AM   Primary Care Physician:  Malick Osborn MD    Attending Cardiologist: Dr. Umang Welsh:     -Presented due to altered mental status. Per chart review, pt recently diagnosed with Alzheimer's with progressive decline over past 4 months. Hypothermic with pinpoint pupils on presentation to [facility] ER on 9/29/2021.  -Sinus pauses. -Echo 7/4/2021: EF 50-55% with global hypokinesis with preserved septal function, normal LV cavity size, mild concentric LVH, normal RV size with low-normal function, no significant valvular disease  -HTN, on Metoprolol and Losartan as outpatient. -DM  -Hypercholesterolemia  -Moderate emphysema at lung bases noted as incidental finding on CT abd/pelvis 6/29/2021. Primary cardiologist is Dr. Velasquez Mcfadden:     Independently seen and evaluated. Agree with below. Patient presented with mental status changes. During her evaluation at [facility] ER, she was noted to have complete heart block transiently without significant symptoms. This was only noted due to patient being on telemetry monitor. Agree with holding beta-blocker at this time. Would continue supportive measures at this time. If this is a time-limited temporary event, I would continue conservative measures. If she does require consideration for permanent pacemaker insertion, I would discuss this with her power of  prior to proceeding.    -Will order nitro paste due to HTN, 's on arrival to CVT ICU.  -Continue telemetry monitoring. Discussed with RN to have pacer pads applied with rate set at 40.  -Echocardiogram to be completed this afternoon.  -Will recheck CMP and Mg, replace electrolytes as needed.  -Avoid AV zach blocking agents.   Pt on PO Lopressor as outpatient.  -Will need swallow evaluation prior to consideration of resuming PO medications.    -Further evaluation of underlying condition/recent mental status change per primary team.  -Would recommend to discuss goals of care with family, consider palliative consultation. Pt with recent diagnosis Alzheimer's dementia, poor functional status at baseline with multiple falls per nursing home records. Would attempt conservative management from cardiac standpoint. History of Present Illness: This is a 80 y.o. female admitted for Sepsis ([facility] [address] [A41.9]. Patient complains of: altered mental status    Gena Dixon is a 80 y.o. female who was sent to [facility] ER from SNF due to altered mental status/concern for CVA. Over the course of her ER stay, pt was found to have sinus pauses lasting 5-10 seconds. She was transferred to CVT ICU for further care. Pt nonverbal, will open eyes on command but not following all commands. [facility] records reviewed, pt normally dependent on others for ADL's except eating, and otherwise noted to have bowel/bladder incontinence. Per note from NP on 9/17/2021, pt with multiple falls recently with last one being same day. Cardiac risk factors: dyslipidemia, diabetes mellitus, hypertension, post-menopausal      Review of Symptoms:      Review of systems not obtained due to patient factors.      Past Medical History:     Past Medical History:   Diagnosis Date    Anxiety     Arthritis     Dementia ([facility] [address]     Diabetes ([facility] [address]     Edema of extremities     Falls     Hematologic disorder     Hepatitis C carrier (HCC)     Hypercholesterolemia     Hypertension     Insomnia     Labyrinthitis     Menopause     Primary osteoarthritis of knees, bilateral     Urge incontinence     Vitamin D deficiency          Social History:     Social History     Socioeconomic History    Marital status: UNKNOWN     Spouse name: Not on file    Number of children: Not on file    Years of education: Not on file    Highest education level: Not on file   Tobacco Use    Smoking status: Never Smoker    Smokeless tobacco: Never Used   Substance and Sexual Activity    Alcohol use: No    Drug use: No     Social Determinants of Health     Financial Resource Strain:     Difficulty of Paying Living Expenses:    Food Insecurity:     Worried About Running Out of Food in the Last Year:     [address] in the Last Year:    Transportation Needs:     Lack of Transportation (Medical):  Lack of Transportation (Non-Medical):    Physical Activity:     Days of Exercise per Week:     Minutes of Exercise per Session:    Stress:     Feeling of Stress :    Social Connections:     Frequency of Communication with Friends and Family:     Frequency of Social Gatherings with Friends and Family:     Attends [employer] Services:     Active Member of Clubs or Organizations:     Attends Club or Organization Meetings:     Marital Status:         Family History:     Family History   Problem Relation Age of Onset    Breast Cancer Sister 80    Ovarian Cancer Sister 35        30s        Medications: Allergies   Allergen Reactions    Contrast Dye [Iodine] Other (comments)     Neurological reaction        Current Facility-Administered Medications   Medication Dose Route Frequency    dextrose 5% infusion  75 mL/hr IntraVENous CONTINUOUS    VANCOMYCIN INFORMATION NOTE   Other Rx Dosing/Monitoring    piperacillin-tazobactam (ZOSYN) 2.25 g in 0.9% sodium chloride (MBP/ADV) 50 mL MBP  2.25 g IntraVENous Q6H     Current Outpatient Medications   Medication Sig    ergocalciferol (Vitamin D2) 1,250 mcg (50,000 unit) capsule Take 1 Capsule by mouth every Sunday.  losartan (COZAAR) 50 mg tablet Take 1 Tablet by mouth daily.  metoprolol tartrate (LOPRESSOR) 25 mg tablet Take 1 Tablet by mouth two (2) times a day.  polyethylene glycol (Miralax) 17 gram/dose powder Take 17 g by mouth daily.  1 tablespoon with 8 oz of water daily    furosemide (LASIX) 20 mg tablet Take 1 Tablet by mouth daily.  gabapentin (NEURONTIN) 300 mg capsule Take 1 Capsule by mouth two (2) times a day. Max Daily Amount: 600 mg.    solifenacin (VESICARE) 10 mg tablet Take 1 Tab by mouth daily. Physical Exam:     Visit Vitals  BP (!) 155/79   Pulse 69   Temp 97.8 °F (36.6 °C)   Resp 18   Ht 5' 4\" (1.626 m)   Wt 78.5 kg (173 lb)   SpO2 100%   BMI 29.70 kg/m²       BP Readings from Last 3 Encounters:   09/30/21 (!) 155/79   07/19/21 112/60   07/13/21 118/75     Pulse Readings from Last 3 Encounters:   09/30/21 69   07/19/21 79   07/13/21 80     Wt Readings from Last 3 Encounters:   09/29/21 78.5 kg (173 lb)   07/07/21 78.5 kg (173 lb)   06/29/21 85.7 kg (189 lb)       General:  Elderly female, non-verbal, opens eyes when asked but not following all commands  Neck:  supple  Lungs:  Coarse expiratory breath sounds to anterolateral lung fields  Heart:  regular rate and rhythm  Abdomen:  abdomen is soft without significant tenderness, masses, organomegaly or guarding  Extremities:  Atraumatic, trace edema  Skin: Warm and dry.   Neuro: opens eyes on command but not following all commands, non-verbal  Psych: unable to adequately assess due to current condition     Data Review:     Recent Labs     09/30/21  0215 09/29/21  1045   WBC 4.8 4.9   HGB 11.1* 13.0   HCT 32.8* 39.2    276     Recent Labs     09/30/21  0215 09/29/21  1045   * 145   K 4.1 4.3   * 112*   CO2 22 27   GLU 62* 74   BUN 21* 23*   CREA 1.51* 1.61*   CA 8.4* 9.3   MG 2.3 2.6   ALB  --  3.2*   ALT  --  27   INR  --  1.0       Results for orders placed or performed during the hospital encounter of 09/29/21   EKG, 12 LEAD, INITIAL   Result Value Ref Range    Ventricular Rate 69 BPM    Atrial Rate 69 BPM    P-R Interval 214 ms    QRS Duration 98 ms    Q-T Interval 432 ms    QTC Calculation (Bezalida) 462 ms    Calculated P Axis 77 degrees    Calculated R Axis -18 degrees    Calculated T Axis 98 degrees Diagnosis       Sinus rhythm with sinus arrhythmia with 1st degree AV block  Nonspecific T wave abnormality  Abnormal ECG  Confirmed by Freeman Roland MD, Bernadette Kee ([id number]) on 9/29/2021 12:53:28 PM         All Cardiac Markers in the last 24 hours:    Lab Results   Component Value Date/Time    TROIQ <0.02 09/30/2021 02:15 AM       Last Lipid:    Lab Results   Component Value Date/Time    Cholesterol, total 188 09/21/2021 09:10 AM    HDL Cholesterol 49 09/21/2021 09:10 AM    LDL, calculated 113 (H) 09/21/2021 09:10 AM    Triglyceride 130 09/21/2021 09:10 AM    CHOL/HDL Ratio 3.8 09/21/2021 09:10 AM       Cardiographics:     EKG Results     Procedure 720 Value Units Date/Time    EKG, 12 LEAD, SUBSEQUENT [[id number]] Collected: 09/30/21 0131    Order Status: Completed Updated: 09/30/21 0133     Ventricular Rate 80 BPM      Atrial Rate 80 BPM      P-R Interval 196 ms      QRS Duration 94 ms      Q-T Interval 410 ms      QTC Calculation (Bezet) 472 ms      Calculated P Axis 75 degrees      Calculated R Axis -28 degrees      Calculated T Axis 77 degrees      Diagnosis --     Normal sinus rhythm  Prolonged QT  Abnormal ECG  When compared with ECG of 29-SEP-2021 10:54,  Nonspecific T wave abnormality, improved in Lateral leads      EKG, 12 LEAD, INITIAL [[id number]] Collected: 09/29/21 1054    Order Status: Completed Updated: 09/29/21 1253     Ventricular Rate 69 BPM      Atrial Rate 69 BPM      P-R Interval 214 ms      QRS Duration 98 ms      Q-T Interval 432 ms      QTC Calculation (Bezet) 462 ms      Calculated P Axis 77 degrees      Calculated R Axis -18 degrees      Calculated T Axis 98 degrees      Diagnosis --     Sinus rhythm with sinus arrhythmia with 1st degree AV block  Nonspecific T wave abnormality  Abnormal ECG  Confirmed by Freeman Roland MD, Bernadette Kee ([id number]) on 9/29/2021 12:53:28 PM          07/03/21    ECHO ADULT COMPLETE 07/04/2021 7/4/2021    Interpretation Summary  · LV: Estimated LVEF is 50 - 55%.  Visually measured ejection fraction. Normal cavity size and systolic function (ejection fraction normal). Mild concentric hypertrophy. Global hypokinesis with preserved septal function. · Normal right ventricular size with low normal function. · MV: Mitral annular calcification. · AV: Mild aortic valve regurgitation is present. · RV: Borderline low systolic function. · LA: Left Atrium volume index is 29 mL/m2. · Insufficient TR to estimate pulmonary artery pressure. Signed by: Drew Harrington MD on 7/4/2021 12:14 PM            XR Results (most recent):  Results from Hospital Encounter encounter on 09/29/21    XR CHEST PORT    Narrative  INDICATION: AMS, evaluate for consolidation    TECHNIQUE: Portable chest radiograph    COMPARISON: 3 July 2021    FINDINGS: The lungs are adequately inflated. No focal consolidation, effusion or  pneumothorax. Heart size within normal limits. Ectatic aorta with  atherosclerotic disease. No acute osseous abnormality. Impression  No acute cardiopulmonary findings.         Signed By: Emery Diaz PA-C     September 30, 2021

## 2021-09-30 NOTE — ED NOTES
Discussed with Dr. Jarocho Marie about sinus pause and sinus ene lasting for a few seconds  Dr. Jarocho Marie to inform cardiology.

## 2021-09-30 NOTE — ED NOTES
Pt continues on bear hugger, remains responsive to pain. Unable to assess pt cognition at this time, per pt family pt recently diagnosed with alzheimer's and dementia. Pt has been declining the past 4 months per family.

## 2021-09-30 NOTE — PALLIATIVE CARE
[facility]  Good Help to Those in Need  [phone]    Consult Note  Patient Name: Anoop Calabrese  YOB: 1934  Age: 80 y.o. Date of Visit: 09/30/21  Facility of Care: [facility]  Patient Room: 2351/01     Attending: Russell Adhikari MD   Diagnosis: Sepsis [facility]) [A41.9]  Sinus pause [I45.5]     This NP met with patient's niece in law and secondary medical power of  Janethguilherme Renee Crete at the patient's bedside. We discussed goals of care and the burdens and benefits of ongoing aggressive care, CPR, and intubation. Also spoke with patient's nephew Yang Landis, and several other family members about goals of care. All family members in agreement for patient to be DO NOT RESUSCITATE DO NOT INTUBATE. They would like to keep the current treatments that she is receiving including external pacing. They agree that if her heart stops and/or her breathing stops they do not want her resuscitated or intubated as this would not be a road to recovery for her. Patient's BSRN present for conversations and Dr. Nanine Dandy made aware. Completed a POST for DO NOT RESUSCITATE limited interventions. Did not address feeding tube at this time. Our team will follow up with patient and family tomorrow. Thank you for the opportunity to assist in the care of this patient and their family. Please contact me at [phone] if you have any further questions.      Willette Bosworth, SHOBHA-C  Palliative Medicine   [place], [address] [facility]

## 2021-09-30 NOTE — ED NOTES
Leads changed, 2ndry to monitor showing periods of asystole while patient breathing and moving head. Will continue to monitor closely.

## 2021-09-30 NOTE — CONSULTS
[facility]: [phone]  [facility]: [phone]     Patient Name: Brando Arenas  YOB: 1934    Date of Consult: 9/30/21   Reason for Consult: establish goals of care  Requesting Provider: Robin Fountain MD   Primary Care Physician: Kae Barlow MD      SUMMARY:   Brando Arenas is a 80 y.o. female with a past history of dementia, DM2, Hep C, who was admitted on 9/29/2021 from [facility] with a diagnosis of AMS and decreased nutritional intake. Current medical issues leading to Palliative Medicine involvement include: establish goals of care. CHIEF COMPLAINT: hypothermia and sinus bradycardia    HPI/SUBJECTIVE:    Patient is an 59-year-old -American female who came in from [facility] with altered mental status times several days, dehydration and poor appetite. She had recently lost her son about 4 months ago and family reports that she has been declining since then. Patient has some level of Alzheimer's dementia at baseline but had been fairly dependent with her ADLs and somewhat able to communicate, she recently has had several falls at the nursing home. While hospitalized patient developed sinus bradycardia and hypoglycemia. She became hypothermic and mental status continued to decline. The patient is:   [] Verbal and participatory  [x] Non-participatory due to:  Minimally responsive, Dementia at baseline     GOALS OF CARE:  Patient/Health Care Proxy Stated Goals: Prolong life      TREATMENT PREFERENCES:   Code Status: DNR         PALLIATIVE DIAGNOSES:   1. Goals of care/ACP  2. Bradycardia  3. Altered mental status  4. Debility       PLAN:   1. Goals of care/ACP  This NP in to see the patient at the bedside she was accompanied by her [facility] and niece in law Aquilla Collet who is also reported to be her secondary MPOA.  She states that her  is the patient's nephew and the primary Loretta Vieyra Alaina. Discussion regarding burdens and benefits of ongoing treatments, CPR and intubation. Mrs Morales Bradford expressed that she would not want to see her aunt go through any suffering if it were not a road to recovery. We then contacted several other family members including her  and I explained the current situation and reiterated the burdens of CPR and intubation. They were all in agreement that when her natural time comes and her heart stops to let her go and do not attempt CPR or place her on a ventilator. A POST was completed for DNR/DNI limited interventions. I did not address feeding tubes at this time. 2.   Bradycardia  Pt developed frequent prolonged pauses and cardiology placed her on an external defibrillation device. 3.   Altered mental status  Minimally responsive which is a change from her baseline dementia status. 4.   Debility  Current PPS is around 20% patient is bed bound, unable to do or interact in any meaningful way, requires total care for all ADL's, minimal to no oral intake. She has a very poor short and long term prognosis. 5.  Initial consult note routed to primary continuity provider  6. Communicated plan of care with: Palliative IDT      Advance Care Planning:  [] [facility] Interdisciplinary Team has updated the ACP Navigator with [address] and Patient Capacity    Primary Decision Maker ([address]): Cedric Rosenthal (nephew)    Medical Interventions: Limited additional interventions   Other Instructions:         As far as possible, the palliative care team has discussed with patient / health care proxy about goals of care / treatment preferences for patient.          HISTORY:     History obtained from: Chart review and family interview   Active Problems:    Sepsis ([facility] [address] (9/29/2021)      Sinus pause (9/30/2021)      Past Medical History:   Diagnosis Date    Anxiety     Arthritis     Dementia ([facility] [address]     Diabetes ([facility] [address]     Edema of extremities     Falls     Hematologic disorder     Hepatitis C carrier ([facility] [address]     Hypercholesterolemia     Hypertension     Insomnia     Labyrinthitis     Menopause     Primary osteoarthritis of knees, bilateral     Urge incontinence     Vitamin D deficiency       Past Surgical History:   Procedure Laterality Date    HX HYSTERECTOMY  1980    HX OTHER SURGICAL  2007    Lap Band for weight loss      Family History   Problem Relation Age of Onset    Breast Cancer Sister 80    Ovarian Cancer Sister 33        33s     History reviewed, no pertinent family history.   Social History     Tobacco Use    Smoking status: Never Smoker    Smokeless tobacco: Never Used   Substance Use Topics    Alcohol use: No     Allergies   Allergen Reactions    Contrast Dye [Iodine] Other (comments)     Neurological reaction      Current Facility-Administered Medications   Medication Dose Route Frequency    insulin lispro (HUMALOG) injection   SubCUTAneous Q6H    hydrALAZINE (APRESOLINE) 20 mg/mL injection 10 mg  10 mg IntraVENous Q6H PRN    sodium chloride (NS) flush 5-40 mL  5-40 mL IntraVENous Q8H    sodium chloride (NS) flush 5-40 mL  5-40 mL IntraVENous PRN    acetaminophen (TYLENOL) tablet 650 mg  650 mg Oral Q6H PRN    Or    acetaminophen (TYLENOL) suppository 650 mg  650 mg Rectal Q6H PRN    polyethylene glycol (MIRALAX) packet 17 g  17 g Oral DAILY PRN    famotidine (PF) (PEPCID) 20 mg in 0.9% sodium chloride 10 mL injection  20 mg IntraVENous DAILY    aspirin (ASA) suppository 300 mg  300 mg Rectal DAILY    albuterol-ipratropium (DUO-NEB) 2.5 MG-0.5 MG/3 ML  3 mL Nebulization Q4H PRN    dextrose 5 % - 0.45% NaCl infusion  100 mL/hr IntraVENous CONTINUOUS    nitroglycerin (NITROBID) 2 % ointment 1 Inch  1 Inch Topical Q6H    glucose chewable tablet 16 g  16 g Oral PRN    glucagon (GLUCAGEN) injection 1 mg  1 mg IntraMUSCular PRN    dextrose (D50W) injection syrg 25 g  50 mL IntraVENous PRN    hydrocortisone Sod Succ (PF) (SOLU-CORTEF) injection 100 mg  100 mg IntraVENous Q8H    thiamine (B-1) 100 mg in 0.9% sodium chloride 50 mL IVPB  100 mg IntraVENous BID    vancomycin (VANCOCIN) 750 mg in 0.9% sodium chloride 250 mL (VIAL-MATE)  750 mg IntraVENous Q24H    piperacillin-tazobactam (ZOSYN) 2.25 g in 0.9% sodium chloride (MBP/ADV) 50 mL MBP  2.25 g IntraVENous Q6H          Clinical Pain Assessment (nonverbal scale for nonverbal patients): Activity (Movement): Lying quietly, normal position    Duration: for how long has pt been experiencing pain (e.g., 2 days, 1 month, years)  Frequency: how often pain is an issue (e.g., several times per day, once every few days, constant)     FUNCTIONAL ASSESSMENT:     Palliative Performance Scale (PPS):  PPS: 20    ECOG  ECOG Status : Completely disabled     PSYCHOSOCIAL/SPIRITUAL SCREENING:      Any spiritual / Christian concerns:  [] Yes /  [x] No    Caregiver Burnout:  [] Yes /  [x] No /  [] No Caregiver Present      Anticipatory grief assessment:   [x] Normal  / [] Maladaptive        REVIEW OF SYSTEMS:     Systems: constitutional, ears/nose/mouth/throat, respiratory, gastrointestinal, genitourinary, musculoskeletal, integumentary, neurologic, psychiatric, endocrine. Positive findings noted below. Modified ESAS Completed by: provider                                       Positive and pertinent negative findings in ROS are noted above in HPI. The following systems were [x] reviewed / [] unable to be reviewed as noted in HPI  Other findings are noted below. PHYSICAL EXAM:     Constitutional: minimally responsive to stimulation  Eyes: pinpoint   ENMT: no nasal discharge, moist mucous membranes  Cardiovascular: bradycardia with frequent pauses  Respiratory: breathing not labored, on 02 n/c  Gastrointestinal: soft non-tender, +bowel sounds  Musculoskeletal: no deformity, no tenderness to palpation  Skin: hypothermia on warming blanket   Neurologic: minimally responsive     Other:   Wt Readings from Last 3 Encounters:   09/30/21 78.5 kg (173 lb)   07/07/21 78.5 kg (173 lb)   06/29/21 85.7 kg (189 lb)     Blood pressure (!) 162/81, pulse 78, temperature 97.9 °F (36.6 °C), resp. rate 23, height 5' 4\" (1.626 m), weight 78.5 kg (173 lb), SpO2 100 %. Pain:  Pain Scale 1: Adult Nonverbal Pain Scale  Pain Intensity 1: 2              Pain Intervention(s) 1: Medication (see MAR)       LAB AND IMAGING FINDINGS:     Lab Results   Component Value Date/Time    WBC 4.5 (L) 10/01/2021 05:40 AM    HGB 10.1 (L) 10/01/2021 05:40 AM    PLATELET 858 91/91/0269 05:40 AM     Lab Results   Component Value Date/Time    Sodium 145 10/01/2021 05:40 AM    Potassium 3.9 10/01/2021 05:40 AM    Chloride 116 (H) 10/01/2021 05:40 AM    CO2 22 10/01/2021 05:40 AM    BUN 21 (H) 10/01/2021 05:40 AM    Creatinine 1.60 (H) 10/01/2021 05:40 AM    Calcium 8.6 10/01/2021 05:40 AM    Magnesium 2.1 10/01/2021 05:40 AM    Phosphorus 3.5 10/01/2021 05:40 AM      Lab Results   Component Value Date/Time    Alk. phosphatase 64 09/30/2021 02:15 PM    Protein, total 6.7 09/30/2021 02:15 PM    Albumin 2.6 (L) 09/30/2021 02:15 PM    Globulin 4.1 (H) 09/30/2021 02:15 PM     Lab Results   Component Value Date/Time    INR 1.0 09/29/2021 10:45 AM    Prothrombin time 12.7 09/29/2021 10:45 AM      No results found for: IRON, FE, TIBC, IBCT, PSAT, FERR   No results found for: PH, PCO2, PO2  No components found for: [place]   Lab Results   Component Value Date/Time    CK 30 10/01/2021 05:40 AM    CK - MB 1.6 10/01/2021 05:40 AM              Total time: 70 minutes   Counseling / coordination time, spent as noted above:   > 50% counseling / coordination:  Time spent in direct consultation with the patient, medical team, and family     Prolonged service was provided for  []30 min   []75 min in face to face time in the presence of the patient, spent as noted above.   Time Start:   Time End:     Disclaimer: Sections of this note are dictated using utilizing voice recognition software, which may have resulted in some phonetic based errors in grammar and contents. Even though attempts were made to correct all the mistakes, some may have been missed, and remained in the body of the document. If questions arise, please contact our department.

## 2021-09-30 NOTE — ED NOTES
[facility] notified to take pt straight to CVT ICU 2351. Pt is no longer going to [facility] ED. [facility] ED made aware of this change. Dr. Sanju Chicas spoke with [facility] RN.

## 2021-09-30 NOTE — ED NOTES
Pt BP elevated 187/128, pt given hydralazine per order. Pt remains responsive to painful stimuli, does spontaneously open eyes. Pt remains on bear hugger.

## 2021-09-30 NOTE — ED NOTES
Pt tachypneic with grunting present, O2 sats @ 100%. MD aware. Pt continues on bear hugger, fluids running. Pt responsive to painful stimuli. Pt respositioned in bed.

## 2021-09-30 NOTE — ED NOTES
TRANSFER - OUT REPORT:    Verbal report given to Jemima Garcia [facility] ER Charge RN) & Jonel Salter RN (CVT ICU) on Clearance Pinckard  being transferred to [facility] ED bed 20 for routine progression of care       Report consisted of patients Situation, Background, Assessment and   Recommendations(SBAR). Information from the following report(s) SBAR, ED Summary and MAR was reviewed with the receiving nurse. Lines:   Peripheral IV 09/29/21 Left Antecubital (Active)   Site Assessment Clean, dry, & intact 09/29/21 1115   Phlebitis Assessment 0 09/29/21 1115   Infiltration Assessment 0 09/29/21 1115   Dressing Status Clean, dry, & intact 09/29/21 1115   Dressing Type Transparent 09/29/21 1115       Peripheral IV 09/29/21 Right Hand (Active)   Site Assessment Clean, dry, & intact 09/29/21 1115   Phlebitis Assessment 0 09/29/21 1115   Infiltration Assessment 0 09/29/21 1115   Dressing Status Clean, dry, & intact 09/29/21 1115   Dressing Type Transparent 09/29/21 1115        Opportunity for questions and clarification was provided.       Patient transported with:   Monitor, IV fluids

## 2021-09-30 NOTE — ED NOTES
Patient noted with short burst of sinus pause followed with wide complex beats then returning to SR, as witness by myself and Provider. Patient remains with respirations and moving head during episode. HOB remains elevated.

## 2021-09-30 NOTE — ED NOTES
Patient noted to ene down then quickly return to SR; Provider informed. VS WNL.  Will continue to monitor closely

## 2021-10-01 PROBLEM — E43 SEVERE PROTEIN-CALORIE MALNUTRITION (HCC): Chronic | Status: ACTIVE | Noted: 2021-01-01

## 2021-10-01 NOTE — PROGRESS NOTES
Cardiology Progress Note    Admit Date: 9/29/2021  Attending Cardiologist: Dr. Mallika Bucio:     -Presented due to altered mental status. Per chart review, pt recently diagnosed with Alzheimer's with progressive decline over past 4 months. Hypothermic with pinpoint pupils on presentation to [facility] ER on 9/29/2021.  -Sinus pauses, transient complete heart block. , requiring external pacing. Electrolytes normal.   -Echo 7/4/2021: EF 50-55% with global hypokinesis with preserved septal function, normal LV cavity size, mild concentric LVH, normal RV size with low-normal function, no significant valvular disease  -HTN, on Metoprolol and Losartan as outpatient. -DM  -Failure to thrive.    -Hypercholesterolemia  -Moderate emphysema at lung bases noted as incidental finding on CT abd/pelvis 6/29/2021.  -DNR/DNI      Primary cardiologist is Dr. Sheth Render:     Patient is unable to arouse. With altered mental status. Likely advanced dementia  Telemetry monitor revealed external pacing overnight 23 times. Pacing is set as rate of 40 bpm. Currently appropriate heart rate  According to chart, she was on metoprolol 25 mg twice daily as outpatient. Agreed to stop any AV zach blocking agent  Patient is not a candidate for permanent pacemaker considering advanced dementia and frail status and comorbidity. Patient now is DNR. Palliative care is meeting with power of  today regarding goals of care. Dr. Day So also briefly evaluated the patient and agrees that patient is not a candidate for pacemaker placement    Subjective:     Fatigued.  Unable to arouse    Objective:      Patient Vitals for the past 8 hrs:   Temp Pulse Resp BP SpO2   10/01/21 1200 97.3 °F (36.3 °C) 71 21 (!) 164/76 100 %   10/01/21 1100 97.5 °F (36.4 °C) 82 23 (!) 149/88 100 %   10/01/21 1000 97.9 °F (36.6 °C) 89 23 (!) 155/105 100 %   10/01/21 0900 97.9 °F (36.6 °C) 77 23 (!) 154/87 100 %   10/01/21 0800 98.1 °F (36.7 °C) 80 23 (!) 143/85 99 %   10/01/21 0700 97.9 °F (36.6 °C) 78 23 (!) 162/81 100 %   10/01/21 0600 97.7 °F (36.5 °C) 81 25 135/68 99 %         Patient Vitals for the past 96 hrs:   Weight   09/30/21 1212 78.5 kg (173 lb)   09/29/21 1114 78.5 kg (173 lb)       TELE: normal sinus rhythm               Current Facility-Administered Medications   Medication Dose Route Frequency Last Admin    insulin lispro (HUMALOG) injection   SubCUTAneous Q6H      hydrALAZINE (APRESOLINE) 20 mg/mL injection 10 mg  10 mg IntraVENous Q6H PRN 10 mg at 09/30/21 1330    sodium chloride (NS) flush 5-40 mL  5-40 mL IntraVENous Q8H 10 mL at 10/01/21 1306    sodium chloride (NS) flush 5-40 mL  5-40 mL IntraVENous PRN      acetaminophen (TYLENOL) tablet 650 mg  650 mg Oral Q6H PRN      Or    acetaminophen (TYLENOL) suppository 650 mg  650 mg Rectal Q6H  mg at 10/01/21 0412    polyethylene glycol (MIRALAX) packet 17 g  17 g Oral DAILY PRN      famotidine (PF) (PEPCID) 20 mg in 0.9% sodium chloride 10 mL injection  20 mg IntraVENous DAILY 20 mg at 10/01/21 0840    aspirin (ASA) suppository 300 mg  300 mg Rectal DAILY 300 mg at 10/01/21 0840    albuterol-ipratropium (DUO-NEB) 2.5 MG-0.5 MG/3 ML  3 mL Nebulization Q4H PRN      dextrose 5 % - 0.45% NaCl infusion  100 mL/hr IntraVENous CONTINUOUS 100 mL/hr at 10/01/21 0550    nitroglycerin (NITROBID) 2 % ointment 1 Inch  1 Inch Topical Q6H 1 Inch at 10/01/21 1304    glucose chewable tablet 16 g  16 g Oral PRN      glucagon (GLUCAGEN) injection 1 mg  1 mg IntraMUSCular PRN      dextrose (D50W) injection syrg 25 g  50 mL IntraVENous PRN      hydrocortisone Sod Succ (PF) (SOLU-CORTEF) injection 100 mg  100 mg IntraVENous Q8H 100 mg at 10/01/21 1304    thiamine (B-1) 100 mg in 0.9% sodium chloride 50 mL IVPB  100 mg IntraVENous  mg at 10/01/21 0959    vancomycin (VANCOCIN) 750 mg in 0.9% sodium chloride 250 mL (VIAL-MATE)  750 mg IntraVENous Q24H IV Completed at 09/30/21 1600 Intake/Output Summary (Last 24 hours) at 10/1/2021 1309  Last data filed at 10/1/2021 1300  Gross per 24 hour   Intake 3000 ml   Output 720 ml   Net 2280 ml       Physical Exam:  Patient is lethargic, unable to arouse  Neck:  no masses  Lungs:  clear to auscultation bilaterally  Heart:  regular rate and rhythm, S1, S2 normal,  Abdomen:  abdomen is soft   Extremities:  No significanty edema    Visit Vitals  BP (!) 164/76   Pulse 71   Temp 97.3 °F (36.3 °C)   Resp 21   Ht 5' 4\" (1.626 m)   Wt 78.5 kg (173 lb)   SpO2 100%   BMI 29.70 kg/m²       Data Review:     Labs: Results:       Chemistry Recent Labs     10/01/21  0540 09/30/21  1415 09/30/21 0215 09/29/21  1045 09/29/21  1045   * 99 62*   < > 74    145 146*   < > 145   K 3.9 4.0 4.1   < > 4.3   * 115* 116*   < > 112*   CO2 22 22 22   < > 27   BUN 21* 20* 21*   < > 23*   CREA 1.60* 1.53* 1.51*   < > 1.61*   CA 8.6 8.9 8.4*   < > 9.3   MG 2.1 2.2 2.3   < > 2.6   PHOS 3.5  --   --   --   --    AGAP 7 8 8   < > 6   BUCR 13 13 14   < > 14   AP  --  64  --   --  81   TP  --  6.7  --   --  8.4*   ALB  --  2.6*  --   --  3.2*   GLOB  --  4.1*  --   --  5.2*   AGRAT  --  0.6*  --   --  0.6*    < > = values in this interval not displayed.       CBC w/Diff Recent Labs     10/01/21  0540 09/30/21  0215 09/29/21  1045   WBC 4.5* 4.8 4.9   RBC 3.58* 3.80* 4.52   HGB 10.1* 11.1* 13.0   HCT 31.2* 32.8* 39.2    264 276   GRANS 72 57 66   LYMPH 21 28 22   EOS 0 3 5      Cardiac Enzymes Lab Results   Component Value Date/Time    CPK 30 10/01/2021 05:40 AM    CPK 43 09/30/2021 08:51 PM    CPK 42 09/30/2021 02:15 PM    CKMB 1.6 10/01/2021 05:40 AM    CKMB 2.3 09/30/2021 08:51 PM    CKMB 2.1 09/30/2021 02:15 PM    CKND1 5.3 (H) 10/01/2021 05:40 AM    CKND1 5.3 (H) 09/30/2021 08:51 PM    CKND1 5.0 (H) 09/30/2021 02:15 PM    TROIQ <0.02 10/01/2021 05:40 AM    TROIQ <0.02 09/30/2021 08:51 PM    TROIQ <0.02 09/30/2021 02:15 PM      Coagulation Recent Labs 09/29/21  1045   PTP 12.7   INR 1.0       Lipid Panel Lab Results   Component Value Date/Time    Cholesterol, total 188 09/21/2021 09:10 AM    HDL Cholesterol 49 09/21/2021 09:10 AM    LDL, calculated 113 (H) 09/21/2021 09:10 AM    VLDL, calculated 26 09/21/2021 09:10 AM    Triglyceride 130 09/21/2021 09:10 AM    CHOL/HDL Ratio 3.8 09/21/2021 09:10 AM      BNP No results found for: BNP, BNPP, XBNPT   Liver Enzymes Recent Labs     09/30/21  1415   TP 6.7   ALB 2.6*   AP 64      Thyroid Studies Lab Results   Component Value Date/Time    TSH 2.66 09/30/2021 02:15 AM          Signed By: Corona Mackenzie PA-C     October 1, 2021

## 2021-10-01 NOTE — PROGRESS NOTES
Comprehensive Nutrition Assessment    Type and Reason for Visit: Initial, Positive nutrition screen    Nutrition Recommendations/Plan:   - Monitor ability to tolerate oral diet versus need for enteral nutrition support, pending decision on goals of care. Nutrition Assessment:  NPO with mentation. Palliative meeting with family for discussion of goals of care this afternoon. Malnutrition Assessment:  Malnutrition Status:  Severe malnutrition    Context:  Chronic illness     Findings of the 6 clinical characteristics of malnutrition:   Energy Intake:  Unable to assess  Weight Loss:  7 - Greater than 5% over 1 month     Body Fat Loss:  7 - Severe body fat loss, Orbital, Buccal region   Muscle Mass Loss:  7 - Severe muscle mass loss, Clavicles (pectoralis &deltoids), Temples (temporalis)    Nutrition History and Allergies: Past medical history of Vitamin D deficiency, hypertension and diabetes, history of lap band surgery for weight loss (2007) admitted with sepsis and altered mentation. Weight history per chart review: 214 lb (3/6/20), 195 lb (2/11/21), 189 lb (6/29/21), 173 lb (7/7/21) with 16 lb, 8.5% weight loss x month. NKFA. Estimated Daily Nutrient Needs:  Energy (kcal): 4236-8188; Weight Used for Energy Requirements: Admission  Protein (g): 63-95; Weight Used for Protein Requirements: Admission  Fluid (ml/day): 3447-6486; Method Used for Fluid Requirements: 1 ml/kcal    Nutrition Related Findings:  Last BM 9/29. Pertinent Medications: D5 1/2NS at 100 mL/hr (120 gm dextrose, 408 kcal per day), thiamine. Wounds:  None       Current Nutrition Therapies:  DIET NPO    Anthropometric Measures:  · Height:  5' 4\" (162.6 cm)  · Current Body Wt:  78.5 kg (173 lb 1 oz)   · Admission Body Wt:  173 lb 1 oz    · Usual Body Wt:  97.1 kg (214 lb)     · Ideal Body Wt:  120 lbs:  144.2 %   · BMI Category: Overweight (BMI 25.0-29. 9)       Nutrition Diagnosis:   · Severe malnutrition, In context of chronic illness related to catabolic illness, cognitive or neurological impairment, inadequate protein-energy intake as evidenced by weight loss, severe loss of subcutaneous fat, severe muscle loss      Nutrition Interventions:   Food and/or Nutrient Delivery: IV fluid delivery, Vitamin supplement  Nutrition Education and Counseling: No recommendations at this time  Coordination of Nutrition Care: Continue to monitor while inpatient, Coordination of community care    Goals:  Provide nutrition intervention as appropriate with goals of care for the next 7 days. Nutrition Monitoring and Evaluation:   Behavioral-Environmental Outcomes: None identified  Food/Nutrient Intake Outcomes: Diet advancement/tolerance  Physical Signs/Symptoms Outcomes: Biochemical data, Meal time behavior, Nutrition focused physical findings    Discharge Planning:     Too soon to determine     Electronically signed by Zhou Blair RD, [id number]  on 10/1/2021 at 2:09 PM    Contact: [phone]

## 2021-10-01 NOTE — ADT AUTH CERT NOTES
Comments  Comment     Last edited by  on  at    Patient Demographics    Patient Name   Michelle Vázquez   [id number] Legal Sex   Female    1934 Address   [address] [place] [address] Phone   [phone] (Home) *Preferred*   [phone] (Mobile)   Patient Demographics    Patient Name   Michelle Vázquez   [id number] Legal Sex   Female    1934 Address   [address] [place] [address] Phone   [phone] (Home) *Preferred*   [phone] (Mobile)   CSN:   [id number]   Admit Date: Admit Time Room Bed   Sep 29, 2021 10:43 AM 2351 [[id number]] 01 [[id number]]   Attending Providers    Provider Pager From To   Mary Carmen Ortega DO  21   Haider Beltran MD  21   Chary Reyes MD  21   Haider Beltran MD  21   Chary Reyes MD  09/30/21 10/01/21   Ignacio Gamino MD  10/01/21    Emergency Contact(s)    Name Relation Home Work Mobile   enrique gurrola [facility]) Other Relative [id number]   Jayesh Worthy [phone]   Tatiana Kramer Friend [phone]   Che Patricio (POA) Other Relative   [phone]   Utilization Reviews       Sepsis and Other Febrile Illness, without Focal Infection - Care Day 3 (10/1/2021) by Viv Philip RN       Review Entered Review Status   10/1/2021 12:41 Completed      Criteria Review      Care Day: 3 Care Date: 10/1/2021 Level of Care: ICU    Guideline Day 2    Level Of Care    (X) ICU or floor    10/1/2021 12:41:16 EDT by Spenser Mock      Cardiac monitoring    Clinical Status    ( ) * Hemodynamic stability    10/1/2021 12:41:16 EDT by Spenser Mock      T 97.3, 98.1  P 93, 42, 77  /84, 171/98, 164/76    (X) * Hypoxemia absent    10/1/2021 12:41:16 EDT by Spenser Mock      % RA    ( ) * Tachypnea absent    10/1/2021 12:41:16 EDT by Spenser Mock      R 33, 29, 31, 23, 30, 25, 23  % RA    Activity    ( ) Activity as tolerated    10/1/2021 12:41:16 EDT by Manjula Lara      bedrest  Elevate HOB    Routes    (X) Possible IV fluids    10/1/2021 12:41:16 EDT by Manjula Lara      D5 1/2 NS IV @ 100 ml/h    (X) Parenteral or oral medications    10/1/2021 12:41:16 EDT by Manjula Lara      Thiamine 100 mg IV bid  Solucortef 100 mg IV q8h  Pepcid 20 mg IV daily  Nitrobid TP 1 inch TP q6h  aspirin 300 mg RE daily  tylenol 650 mg RE prn x 1    ( ) Diet as tolerated    10/1/2021 12:41:16 EDT by Manjula Lara      NPO    Interventions    (X) WBC    10/1/2021 12:41:16 EDT by Manjula Lara      WBC 4.5  Hgb 10.1      BUN 21  Crea 1.60    Medications    (X) Possible antimicrobial treatment    10/1/2021 12:41:16 EDT by Manjula Lara      Zosyn 2.25 g IV q6h  Vancomycin 750 mg IV q24h    (X) Possible DVT prophylaxis    10/1/2021 12:41:16 EDT by Manjula Lara      SCDs asa    * Milestone   Additional Notes   Palliative Medicine Consult        SUMMARY:   Eli Setting a 80 y. o. female with a past history of dementia, DM2, Hep C, who was admitted on 9/29/2021 from [facility] with a diagnosis of AMS and decreased nutritional intake. Current medical issues leading to Palliative Medicine involvement include: establish goals of care.       CHIEF COMPLAINT: hypothermia and sinus bradycardia       HPI/SUBJECTIVE:     Patient is an 77-year-old -American female who came in from [facility] with altered mental status times several days, dehydration and poor appetite.  She had recently lost her son about 4 months ago and family reports that she has been declining since then. Africa Cantu has some level of Alzheimer's dementia at baseline but had been fairly dependent with her ADLs and somewhat able to communicate, she recently has had several falls at the nursing home.  While hospitalized patient developed sinus bradycardia and hypoglycemia.  She became hypothermic and mental status continued to decline. 10/1/21:   Pt continues to have frequent periods of sinus pauses and hypothermia. Remains on the warming blanket and external pacer/fribulator      PLAN:   10/1/21: This NP in to see patient with Mimi Rosario RN pt is not responsive and appears in mild to moderate distress with facial grimacing. Family coming in today and our team will reach out to support for ongoing discussions about escalation of care and continuation of current treatments which are serving only to prolong her dying process. At this time patient remains a DNR/DNI POST completed and on file    1.   Goals of care/ACP   This NP in to see the patient at the bedside she was accompanied by her [facility] and niece in law Fina Bruner who is also reported to be her secondary MPOA. She states that her  is the patient's nephew and the primary MPOA, Enrique Foley. Discussion regarding burdens and benefits of ongoing treatments, CPR and intubation. Mrs Jere Fry expressed that she would not want to see her aunt go through any suffering if it were not a road to recovery. We then contacted several other family members including her  and I explained the current situation and reiterated the burdens of CPR and intubation. They were all in agreement that when her natural time comes and her heart stops to let her go and do not attempt CPR or place her on a ventilator. A POST was completed for DNR/DNI limited interventions. I did not address feeding tubes at this time. 2.   Bradycardia   Pt developed frequent prolonged pauses and cardiology placed her on an external defibrillation device. 3.   Altered mental status   Minimally responsive which is a change from her baseline dementia status. 4.   Debility   Current PPS is around 20% patient is bed bound, unable to do or interact in any meaningful way, requires total care for all ADL's, minimal to no oral intake. She has a very poor short and long term prognosis.     5.  Initial consult note routed to primary continuity provider   6.  Communicated plan of care with: Palliative IDT      Clinical Pain Assessment (nonverbal scale for nonverbal patients):        Activity (Movement): Lying quietly, normal position      NURSING Date of Service:  10/01/21 1200       0800- Per night nurse patient has had at least 4 episodes of bradycardia requiring backup external pacing. On assessment pt moans to localizes to pain but does not open her eyes or respond to verbal commands. Pt currently has intrinsic SR rate 70's. External pads on, rate set at 40 bmp and at 60mV.        0900- Bradycardic episode seen on monitor. Pt HR slowed then back-up pacing started and lasting 13 beats.        0930- Patient has had 2 additional episodes of external pacing lasting 5-10 beats. Pt now normothermic. Kendell hugger turned off.        1200- Pt temperature has slowly decreased since removing warming blanket. Now 97.3 F rectally. Will resume warming with kendell hugger.       CASE MANAGEMENT    Currently, the discharge plan is for the pt to [address] Registered Nurse    2107-Pt began breathing in the 90'X with BP systolic in 997F. Pt appeared to be in pain, given rectal tylenol.        2108-Pt BP improved, respirations back to her baseline in mid 20's. Received critical results that her aerobic blood culture was positive for gram positive cocci in groups, MD Barbosa notified.        2300-Pt resting, NAD, VSS. No apparent pain.        0100-Pt temp improving. SR on the monitor, only had 1 bradycardic episode so far this shift.  BP stable.          9/30 H&P & Consult notes by Sang Smalls RN       Review Entered Review Status   10/1/2021 12:32 In Primary      Criteria Review         NEUROLOGY CONSULT NOTE  Date of Consultation:  September 30, 2021  Reason for Consultation: Altered mental status     Assessment:      This is a 80year-old woman, with history of Alzheimer disease, hypertension, diabetes, hyperlipidemia, with overall decline over the past 4 months, evaluated today for encephalopathy.  On exam the patient appeared almost obtunded however she follows commands.  I do not suspect seizure etiology.  I am rather concerned that her presentation is due to overall failure to thrive/new onset cardiomyopathy/hypertensive encephalopathy.   I agree with the family decision to continue at this time with palliative care due to poor functional status at baseline.     Plan:      - conservative management from neurological standpoint  -Okay to continue aspirin suppository  -Treat possible underlying infection per ID recommendations  -I will defer the rest of the treatment to palliative care, no other recommendation from neurological point of view, will sign off        Infectious Disease Consultation Note   Reason: Sepsis of unknown etiology  Assessment :     80 y. o. female with past medical history significant for dementia, type 2 diabetes, hepatitis C sent to [facility] ER from [facility] on 9/29/21 due to altered mental status/concern for CVA.     Now with altered mental status, bradycardia with sinus pauses, hypothermia     I agree that hypothermia/altered mental status is concerning for sepsis. Jessika Pulling after detailed review of labs, exam; clinical probability of sepsis is low.     Procalcitonin less than 0.05, no leukocytosis, no hypotension argues against bacterial sepsis     No evidence of hypothyroidism     Progressive decline in the past few months per records. ?  Undiagnosed paraneoplastic syndrome     Cardiology follow-up appreciated.     Recommendations:     1.  Continue Zosyn, vancomycin for now.  Will discontinue antibiotics in a.m. if blood cultures remain negative  2.  Follow-up cardiology, neurology recommendations  3.  Agree with the discussions regarding goals of care with family  4.  Consider CT chest/abdomen/pelvis, MRI brain to rule out undiagnosed malignancy, paraneoplastic syndrome if family wishes for aggressive measures      Thank you for consultation request. Above plan was discussed in details with RN, cardiology NP and dr Richy Vargas. Please call me if any further questions or concerns. Will continue to participate in the care of this patient. HPI:     80 y. o. female with past medical history significant for dementia, type 2 diabetes, hepatitis C sent to [facility] ER from SNF on 9/29/21 due to altered mental status/concern for CVA.  Over the course of her ER stay, pt was found to have sinus pauses lasting 5-10 seconds.  She was transferred to CVT ICU for further care.  Pt nonverbal, will open eyes on command but not following all commands.    Obtained history from review of records, talking to the hospitalist. Eda Jeans transfer to CVT ICU, patient was noted to have hypothermia, intermittent bradycardia.  Cardiology consulted.  Transcutaneous pacer pads placed. Africa Cantu was started on broad-spectrum antibiotics since admission due to concerns for sepsis.  Neurology has been consulted. Vickie Fergusonn been consulted for further recommendations.     Per records, pt normally dependent on others for ADL's except eating, and otherwise noted to have bowel/bladder incontinence.   pt with multiple falls recently.  No hypotension since admission.  No fevers since admission.  Procalcitonin less than 0.05           Hospitalist Admission History and Physical  Date/Time:      9/30/2021 1:08 PM  Subjective:   CHIEF COMPLAINT: Change in mental status     HISTORY OF PRESENT ILLNESS:     This is a 49-year-old female with past medical history of hypertension and diabetes who presented to the [facility] ED with a change in mental status.  Patient resides at a care facility and was noted to be having a change in mental status and there was concern for dehydration.  While at [facility] ED patient was noted to be hypothermic and there was concern for sepsis of unclear source.  Patient was started on empiric antibiotics.  CT of the chest abdomen pelvis was done which showed some mild mediastinal lymphadenopathy and a right thyroid nodule and some cholelithiasis.  Patient was also noted to have some low blood sugars and was started on D5 at [facility] patient was noted to have frequent prolonged pauses and after talking to cardiology patient was transferred to [facility] cardiac ICU.  When I saw the patient she is laying in bed.  Patient is awake and confused and follows some simple commands.  Patient is noted to be able to move all 4 extremities.  Unable to obtain any further details as patient is confused     PHYSICAL EXAM:   General:          YKPGI in bed in no acute distress.  Confused  HEENT:           Pupils equal.  Sclera anicteric.  Conjunctiva pink.  Mucous membranes                           Moist, no ear or nasal discharge  Neck:               Supple.  Trachea midline.  No accessory muscle use.  No thyromegaly.                          No jugular venous distention, no carotid bruit  CV:                  Regular rate and rhythm. S1S2+  Lungs:             Clear to auscultation bilaterally.  No Wheezing or Rhonchi. No rales. Abdomen:        Soft, non-tender. Not distended.  Bowel sounds normal.   Extremities:     No cyanosis.  No edema. Pulses 1+ b/l  Neurologic:      Patient is awake and confused.  Patient follows some commands.  Patient is able to move all 4 extremities  Skin:                Warm and dry.  No rashes.      Assessment/Plan:      Acute encephalopathy, likely metabolic   Hypoglycemia  Sinus pauses with bradycardia  Hypothermia  Concern for sepsis of unclear source  Dehydration  Uncontrolled hypertension, hypertensive urgency  Right thyroid nodule on CT scan  Mild mediastinal lymphadenopathy on CT scan  Cholelithiasis on CT scan   ___________________________________________________  PLAN:    Patient has been admitted to cardiac ICU.  Cardiology has been consulted.   Continue external pacemaker at bedside with pacemaker pads on patient. Continue IV fluids with D5, monitor sugars  Bear hugger for hypothermia  Broad-spectrum antibiotics for possible sepsis.  Follow culture.  ID consulted  Neurology consulted and saw the patient at bedside with me. Judicious management of blood pressure.    Repeat ABG looks reassuring  Discussed with RN. Virginie Freeman discussed with patient's nephew over the phone I discussed with the cardiology APC.  I discussed with the ID physician. Virginie Freeman discussed with the neurologist  Patient is a full code  Palliative care consulted           09/30/21 1118 Cardiology Initial Patient Referral Note      Assessment:    -Presented due to altered mental status.  Per chart review, pt recently diagnosed with Alzheimer's with progressive decline over past 4 months.  Hypothermic with pinpoint pupils on presentation to [facility] ER on 9/29/2021.  -Sinus pauses. -Echo 7/4/2021: EF 50-55% with global hypokinesis with preserved septal function, normal LV cavity size, mild concentric LVH, normal RV size with low-normal function, no significant valvular disease  -HTN, on Metoprolol and Losartan as outpatient.   -DM  -Hypercholesterolemia  -Moderate emphysema at lung bases noted as incidental finding on CT abd/pelvis 6/29/2021.     Primary cardiologist is Dr. Emir Knox:      Independently seen and evaluated. Ingrid Johnson with below. Addison Major presented with mental status changes.  During her evaluation at [facility], she was noted to have complete heart block transiently without significant symptoms.  This was only noted due to patient being on telemetry monitor. Ingrid Johnson with holding beta-blocker at this time.  Would continue supportive measures at this time. Cristina Holstein this is a time-limited temporary event, I would continue conservative measures.  If she does require consideration for permanent pacemaker insertion, I would discuss this with her power of  prior to proceeding.     -Will order nitro paste due to HTN, 's on arrival to CVT ICU.  -Continue telemetry monitoring.  Discussed with RN to have pacer pads applied with rate set at 40.  -Echocardiogram to be completed this afternoon.  -Will recheck CMP and Mg, replace electrolytes as needed.  -Avoid AV zach blocking agents.  Pt on PO Lopressor as outpatient.  -Will need swallow evaluation prior to consideration of resuming PO medications.    -Further evaluation of underlying condition/recent mental status change per primary team.  -Would recommend to discuss goals of care with family, consider palliative consultation.  Pt with recent diagnosis Alzheimer's dementia, poor functional status at baseline with multiple falls per nursing home records.  Would attempt conservative management from cardiac standpoint.         Sepsis and Other Febrile Illness, without Focal Infection - Care Day 2 (9/30/2021) by Kerry Clancy RN       Review Entered Review Status   10/1/2021 12:24 Completed      Criteria Review      Care Day: 2 Care Date: 9/30/2021 Level of Care: ICU    Guideline Day 2    Level Of Care    (X) ICU or floor    10/1/2021 12:24:17 EDT by Mohini Lee      9/30 tx to ICU  [facility]  Cardiac monitoring  NV checks q4h    Clinical Status    ( ) * Hemodynamic stability    10/1/2021 12:24:17 EDT by Rohith Wright 92.5 @ 1346 > 96.6 @ 2200, 96.8 @ 2300  P 90, 43, 88, 47, 0, 53  /88, 107/63, 215/95, 101/102, 120/80    (X) * Hypoxemia absent    10/1/2021 12:24:17 EDT by Mohini Lee      99, 100 % RA    ( ) * Tachypnea absent    10/1/2021 12:24:17 EDT by Mohini Lee      R 28, 22, 27, 18, 25, 19, 30, 32    Activity    ( ) Activity as tolerated    10/1/2021 12:24:17 EDT by Mohini Lee      Bedrest    Routes    (X) Possible IV fluids    10/1/2021 12:24:17 EDT by Mohini Lee      D% IV @ 75 ml/h > D5 1/2 NS IV @ 75 ml/h    (X) Parenteral or oral medications    10/1/2021 12:24:17 EDT by Mohini Lee      Thiamine 100 mg IV bid  Solucortef 100 mg IV q8h  Hydralazine 10 mg IV q6h prn x 1  Nitrobid TP 1 inch TP q6h  aspirin 300 mg RE daily  tylenol 650 mg RE prn x 2    ( ) Diet as tolerated    10/1/2021 12:24:17 EDT by Yazmin Causey      NPO    Interventions    (X) WBC    10/1/2021 12:24:17 EDT by Yazmin Causey      WBC 4.8  Hgb 11.1      BUN 21, 20  Crea 1.51, 1.53  Troponin < 0/02 x 3  CRP 1.2    Medications    (X) Possible antimicrobial treatment    10/1/2021 12:24:17 EDT by Yazmin Causey      Zosyn 2.25 g IV q6h  Vancomycin 750 mg IV q24h    (X) Possible DVT prophylaxis    10/1/2021 12:24:17 EDT by Yazmin Causey      SCDs    * Milestone   Additional Notes   ECHO ADULT LIMITED W DOPPLER & COLOR    Reason for Exam   Priority: Routine   bradycardia   Comments: Please complete 9/30   Image quality for this study was technically difficult. LV: Estimated LVEF is 60 - 65%. Visually measured ejection fraction. Normal cavity size and systolic function (ejection fraction normal). Mild concentric hypertrophy. Wall motion: normal.   PA: Pulmonary arterial systolic pressure is 27 mmHg. EKG, 12 LEAD, SUBSEQUENT    Normal sinus rhythm   Prolonged QT   Abnormal ECG   When compared with ECG of 29-SEP-2021 10:54,   Nonspecific T wave abnormality, improved in Lateral leads       CT CHEST ABD PELV WO CONT    1.  Mild mediastinal lymphadenopathy. 2. Right thyroid nodule can be assessed with ultrasound. 3. Cholelithiasis. 4. Accumulation of fluid and gas in the moderate hiatal hernia/gastric pouch   proximal to laparoscopic gastric band, suggesting delayed flow through the band   versus reflux. No distention of the more proximal esophagus to indicate true   obstruction. 5. Moderate colonic stool burden without clear evidence of obstruction. 6. Other incidental chronic CT findings as above.

## 2021-10-01 NOTE — PROGRESS NOTES
Hospitalist Progress Note    Subjective:   Daily Progress Note: 10/1/2021     Patient is nonverbal.  She moves all extremities on tactile commands. Poor appetite. Continues to have transcutaneous pacing with multiple episodes of bradycardia. Family is at bedside and waiting for more family members to come so they can withdraw the care. Patient cannot provide any history at this point. Appetite remains poor. She is a DNR. Current Facility-Administered Medications   Medication Dose Route Frequency    insulin lispro (HUMALOG) injection   SubCUTAneous Q6H    hydrALAZINE (APRESOLINE) 20 mg/mL injection 10 mg  10 mg IntraVENous Q6H PRN    sodium chloride (NS) flush 5-40 mL  5-40 mL IntraVENous Q8H    sodium chloride (NS) flush 5-40 mL  5-40 mL IntraVENous PRN    acetaminophen (TYLENOL) tablet 650 mg  650 mg Oral Q6H PRN    Or    acetaminophen (TYLENOL) suppository 650 mg  650 mg Rectal Q6H PRN    polyethylene glycol (MIRALAX) packet 17 g  17 g Oral DAILY PRN    famotidine (PF) (PEPCID) 20 mg in 0.9% sodium chloride 10 mL injection  20 mg IntraVENous DAILY    aspirin (ASA) suppository 300 mg  300 mg Rectal DAILY    albuterol-ipratropium (DUO-NEB) 2.5 MG-0.5 MG/3 ML  3 mL Nebulization Q4H PRN    dextrose 5 % - 0.45% NaCl infusion  100 mL/hr IntraVENous CONTINUOUS    nitroglycerin (NITROBID) 2 % ointment 1 Inch  1 Inch Topical Q6H    glucose chewable tablet 16 g  16 g Oral PRN    glucagon (GLUCAGEN) injection 1 mg  1 mg IntraMUSCular PRN    dextrose (D50W) injection syrg 25 g  50 mL IntraVENous PRN    hydrocortisone Sod Succ (PF) (SOLU-CORTEF) injection 100 mg  100 mg IntraVENous Q8H    thiamine (B-1) 100 mg in 0.9% sodium chloride 50 mL IVPB  100 mg IntraVENous BID    vancomycin (VANCOCIN) 750 mg in 0.9% sodium chloride 250 mL (VIAL-MATE)  750 mg IntraVENous Q24H        Review of Systems  Review of systems not obtained due to patient factors.     Objective:     Visit Vitals  BP (!) 160/95   Pulse 83   Temp 98.1 °F (36.7 °C)   Resp 27   Ht 5' 4\" (1.626 m)   Wt 78.5 kg (173 lb)   SpO2 100%   BMI 29.70 kg/m²      O2 Device: None (Room air)    Temp (24hrs), Av.6 °F (35.9 °C), Min:93.2 °F (34 °C), Max:98.1 °F (36.7 °C)      10/01 0701 - 10/01 1900  In: 250 [I.V.:250]  Out: 130 [Urine:130]  1901 - 10/01 0700  In: 2750 [I.V.:2750]  Out: 1115 [UNDIX:4016]      General appearance -nonverbal, not responding to verbal commands.   Chest -diminished air entry noted in bases, no wheezes, poor air entry  Heart - S1 and S2 normal, temporary external pacemaker in situ  Abdomen - soft, nontender, nondistended, Bowel sounds present  Neurological -not responding to verbal commands, withdraws to painful stimuli  Musculoskeletal - no joint tenderness or erythema of knees bilaterally  Extremities - no pitting pedal edema noted      Data Review    Recent Results (from the past 24 hour(s))   GLUCOSE, POC    Collection Time: 21  5:42 PM   Result Value Ref Range    Glucose (POC) 105 70 - 110 mg/dL   CARDIAC PANEL,(CK, CKMB & TROPONIN)    Collection Time: 21  8:51 PM   Result Value Ref Range    CK - MB 2.3 <3.6 ng/ml    CK-MB Index 5.3 (H) 0.0 - 4.0 %    CK 43 26 - 192 U/L    Troponin-I, QT <0.02 0.0 - 0.045 NG/ML   GLUCOSE, POC    Collection Time: 21  9:20 PM   Result Value Ref Range    Glucose (POC) 135 (H) 70 - 110 mg/dL   CARDIAC PANEL,(CK, CKMB & TROPONIN)    Collection Time: 10/01/21  5:40 AM   Result Value Ref Range    CK - MB 1.6 <3.6 ng/ml    CK-MB Index 5.3 (H) 0.0 - 4.0 %    CK 30 26 - 192 U/L    Troponin-I, QT <0.02 0.0 - 0.045 NG/ML   CBC WITH AUTOMATED DIFF    Collection Time: 10/01/21  5:40 AM   Result Value Ref Range    WBC 4.5 (L) 4.6 - 13.2 K/uL    RBC 3.58 (L) 4.20 - 5.30 M/uL    HGB 10.1 (L) 12.0 - 16.0 g/dL    HCT 31.2 (L) 35.0 - 45.0 %    MCV 87.2 78.0 - 100.0 FL    MCH 28.2 24.0 - 34.0 PG    MCHC 32.4 31.0 - 37.0 g/dL    RDW 15.3 (H) 11.6 - 14.5 %    PLATELET 776 999 - 370 K/uL    MPV 10.3 9.2 - 11.8 FL    NEUTROPHILS 72 40 - 73 %    LYMPHOCYTES 21 21 - 52 %    MONOCYTES 3 3 - 10 %    EOSINOPHILS 0 0 - 5 %    BASOPHILS 0 0 - 2 %    ABS. NEUTROPHILS 3.2 1.8 - 8.0 K/UL    ABS. LYMPHOCYTES 0.9 0.9 - 3.6 K/UL    ABS. MONOCYTES 0.2 0.05 - 1.2 K/UL    ABS. EOSINOPHILS 0.0 0.0 - 0.4 K/UL    ABS. BASOPHILS 0.0 0.0 - 0.1 K/UL    DF AUTOMATED     METABOLIC PANEL, BASIC    Collection Time: 10/01/21  5:40 AM   Result Value Ref Range    Sodium 145 136 - 145 mmol/L    Potassium 3.9 3.5 - 5.5 mmol/L    Chloride 116 (H) 100 - 111 mmol/L    CO2 22 21 - 32 mmol/L    Anion gap 7 3.0 - 18 mmol/L    Glucose 145 (H) 74 - 99 mg/dL    BUN 21 (H) 7.0 - 18 MG/DL    Creatinine 1.60 (H) 0.6 - 1.3 MG/DL    BUN/Creatinine ratio 13 12 - 20      GFR est AA 37 (L) >60 ml/min/1.73m2    GFR est non-AA 30 (L) >60 ml/min/1.73m2    Calcium 8.6 8.5 - 10.1 MG/DL   MAGNESIUM    Collection Time: 10/01/21  5:40 AM   Result Value Ref Range    Magnesium 2.1 1.6 - 2.6 mg/dL   PHOSPHORUS    Collection Time: 10/01/21  5:40 AM   Result Value Ref Range    Phosphorus 3.5 2.5 - 4.9 MG/DL   SED RATE (ESR)    Collection Time: 10/01/21  5:40 AM   Result Value Ref Range    Sed rate, automated 31 (H) 0 - 30 mm/hr   GLUCOSE, POC    Collection Time: 10/01/21  5:48 AM   Result Value Ref Range    Glucose (POC) 144 (H) 70 - 110 mg/dL   GLUCOSE, POC    Collection Time: 10/01/21 11:52 AM   Result Value Ref Range    Glucose (POC) 147 (H) 70 - 110 mg/dL         Assessment/Plan:     Active Problems:    Sepsis (HCC) (9/29/2021)      Sinus pause (9/30/2021)      Severe protein-calorie malnutrition ([address] (10/1/2021)    ASSESSMENT:    1. Acute on chronic metabolic encephalopathy  2. Chronic encephalopathy due to Alzheimer's dementia  3. 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 Failure to thrive and poor appetite   5. Hypertension  6. Dyslipidemia  7. Moderate emphysema  8. Diabetes mellitus  9. Right thyroid nodule  10. Hypothermia    PLAN:    Continue external pacemaker until family makes a final decision to withdraw the care  Palliative care team is following this patient  Continue Kendell hugger for #10  Continue insulin sliding scale for diabetes mellitus  Continue IV hydralazine as needed for hypertension  Guarded prognosis    Total time to take care of this patient was 30 minutes and more than 50% of time was spent counseling and coordinating care. Disclaimer: Sections of this note are dictated using utilizing voice recognition software, which may have resulted in some phonetic based errors in grammar and contents. Even though attempts were made to correct all the mistakes, some may have been missed, and remained in the body of the document. If questions arise, please contact our department.

## 2021-10-01 NOTE — PROGRESS NOTES
1900-Received verbal and bedside report from Jonel AZEVEDO RN. Pt resting in bed with eyes closed, no apparent pain, attached to external pacemaker at back up rate of 40, 60mA. Intrinsic rhythm is SR in the 80s. Room air. No resp distress noted. Magali Vickiekasey in mid 25s. Pt is clean/dry, awais hugger in place, skin intact. No burns noted. 2107-Pt began breathing in the 26'G with BP systolic in 922J. Pt appeared to be in pain, given rectal tylenol. 2108-Pt BP improved, respirations back to her baseline in mid 20's. Received critical results that her aerobic blood culture was positive for gram positive cocci in groups, MD Barbosa notified. 2300-Pt resting, NAD, VSS. No apparent pain. 0100-Pt temp improving. SR on the monitor, only had 1 bradycardic episode so far this shift. BP stable. 0400-CHG bath given, gown changed, oral care performed. SCDs placed on immobile pt. NSR. External pacer on back up rate of 40 BPM, 60 mA. No apparent pain, clean/dry. 0700-Bedside and Verbal shift change report given to Jonel AZEVEDO RN (oncoming nurse) by Meche Kevin RN (offgoing nurse). Report included the following information SBAR, Kardex, Intake/Output, MAR and Cardiac Rhythm NSR with bradycardic episodes.

## 2021-10-01 NOTE — PROGRESS NOTES
Problem: Patient Education:  Go to Education Activity  Goal: Patient/Family Education  Outcome: Progressing Towards Goal     Problem: Falls - Risk of  Goal: *Absence of Falls  Description: Document Jose Francisco Velasquezs Fall Risk and appropriate interventions in the flowsheet. Outcome: Progressing Towards Goal  Note: Fall Risk Interventions:       Mentation Interventions: Adequate sleep, hydration, pain control, More frequent rounding, Increase mobility, Reorient patient    Medication Interventions: Assess postural VS orthostatic hypotension, Patient to call before getting OOB, Evaluate medications/consider consulting pharmacy, Teach patient to arise slowly    Elimination Interventions: Call light in reach, Toileting schedule/hourly rounds, Toilet paper/wipes in reach    History of Falls Interventions: Bed/chair exit alarm, Investigate reason for fall, Room close to nurse's station         Problem: Pressure Injury - Risk of  Goal: *Prevention of pressure injury  Description: Document John Scale and appropriate interventions in the flowsheet. Outcome: Progressing Towards Goal  Note: Pressure Injury Interventions:  Sensory Interventions: Assess changes in LOC, Avoid rigorous massage over bony prominences, Turn and reposition approx. every two hours (pillows and wedges if needed), Minimize linen layers         Activity Interventions: Assess need for specialty bed, Pressure redistribution bed/mattress(bed type)    Mobility Interventions: Assess need for specialty bed, Pressure redistribution bed/mattress (bed type), Turn and reposition approx.  every two hours(pillow and wedges)    Nutrition Interventions: Document food/fluid/supplement intake, Discuss nutritional consult with provider    Friction and Shear Interventions: Apply protective barrier, creams and emollients, Foam dressings/transparent film/skin sealants, HOB 30 degrees or less, Transfer aides:transfer board/Marta lift/ceiling lift, Lift team/patient mobility team

## 2021-10-01 NOTE — PROGRESS NOTES
Palliative Medicine     Palliative Team members Moni Will NP and this writer in to see patient. Pt is not responsive and appears in mild to moderate distress with facial grimacing. Warming blanket still in use for temp control. Team members met with family at bedside, medical update on current medical condition. Team members offered compassionate support and pray shawl. Family expressed concern around friends having time to come and visit with Ms. Amol Freeman. Offered and assisted family with a video call for pt's 80year old friend in [place]. Team explained the external pacer protocol which the family  understood and accepted. At this time patient remains a DNR/DNI POST completed and on file   Attending and BSRN updated.      Vikash Saucedo BSN, RN, [facility]  Palliative Medicine Inpatient RN  Palliative COPE Line: [phone]

## 2021-10-01 NOTE — PROGRESS NOTES
Infectious Disease Consultation Note        Reason: Sepsis of unknown etiology    Current abx Prior abx   Zosyn, vancomycin since 9/29/2021      Lines:       Assessment :    80 y.o. female with past medical history significant for dementia, type 2 diabetes, hepatitis C sent to  [facility] ER from [facility] on 9/29/21 due to altered mental status/concern for CVA. Now with altered mental status, bradycardia with sinus pauses, hypothermia    I agree that hypothermia/altered mental status is concerning for sepsis. However after detailed review of labs, exam; clinical probability of sepsis is low. Procalcitonin less than 0.05, no leukocytosis, no hypotension argues against bacterial sepsis    Now with Single positive blood culture 9/29 positive for gram positive cocci - could be contaminant. Will f/u ID to determine this    No evidence of hypothyroidism    Progressive decline in the past few months per records. ? Undiagnosed paraneoplastic syndrome    Cardiology follow-up appreciated. Recommendations:    1. Continue  vancomycin for now. D/c zosyn. discontinue vancomycin if isolate in blood culture 9/29 if coagulase-negative Staphylococcus  2. Follow-up cardiology, neurology recommendations  3. Await family's decisions regarding final goals of care   4. Consider CT chest/abdomen/pelvis, MRI brain to rule out undiagnosed malignancy, paraneoplastic syndrome if family wishes for aggressive measures  5. Repeat blood culture x 1 today    Above plan was discussed in details with RN, primary team. Please call me if any further questions or concerns. Will continue to participate in the care of this patient. HPI:    Non verbal.         Current Discharge Medication List      CONTINUE these medications which have NOT CHANGED    Details   ergocalciferol (Vitamin D2) 1,250 mcg (50,000 unit) capsule Take 1 Capsule by mouth every Sunday.   Qty: 4 Capsule, Refills: 0      losartan (COZAAR) 50 mg tablet Take 1 Tablet by mouth daily.  Qty: 30 Tablet, Refills: 0      metoprolol tartrate (LOPRESSOR) 25 mg tablet Take 1 Tablet by mouth two (2) times a day. Qty: 60 Tablet, Refills: 0      polyethylene glycol (Miralax) 17 gram/dose powder Take 17 g by mouth daily. 1 tablespoon with 8 oz of water daily  Qty: 117 g, Refills: 0      furosemide (LASIX) 20 mg tablet Take 1 Tablet by mouth daily. Qty: 30 Tablet, Refills: 0      gabapentin (NEURONTIN) 300 mg capsule Take 1 Capsule by mouth two (2) times a day. Max Daily Amount: 600 mg. Qty: 6 Capsule, Refills: 0    Associated Diagnoses: DM type 2 with diabetic peripheral neuropathy (HCC)      solifenacin (VESICARE) 10 mg tablet Take 1 Tab by mouth daily.   Qty: 90 Tab, Refills: 3             Current Facility-Administered Medications   Medication Dose Route Frequency    insulin lispro (HUMALOG) injection   SubCUTAneous Q6H    hydrALAZINE (APRESOLINE) 20 mg/mL injection 10 mg  10 mg IntraVENous Q6H PRN    sodium chloride (NS) flush 5-40 mL  5-40 mL IntraVENous Q8H    sodium chloride (NS) flush 5-40 mL  5-40 mL IntraVENous PRN    acetaminophen (TYLENOL) tablet 650 mg  650 mg Oral Q6H PRN    Or    acetaminophen (TYLENOL) suppository 650 mg  650 mg Rectal Q6H PRN    polyethylene glycol (MIRALAX) packet 17 g  17 g Oral DAILY PRN    famotidine (PF) (PEPCID) 20 mg in 0.9% sodium chloride 10 mL injection  20 mg IntraVENous DAILY    aspirin (ASA) suppository 300 mg  300 mg Rectal DAILY    albuterol-ipratropium (DUO-NEB) 2.5 MG-0.5 MG/3 ML  3 mL Nebulization Q4H PRN    dextrose 5 % - 0.45% NaCl infusion  100 mL/hr IntraVENous CONTINUOUS    nitroglycerin (NITROBID) 2 % ointment 1 Inch  1 Inch Topical Q6H    glucose chewable tablet 16 g  16 g Oral PRN    glucagon (GLUCAGEN) injection 1 mg  1 mg IntraMUSCular PRN    dextrose (D50W) injection syrg 25 g  50 mL IntraVENous PRN    hydrocortisone Sod Succ (PF) (SOLU-CORTEF) injection 100 mg  100 mg IntraVENous Q8H    thiamine (B-1) 100 mg in 0.9% sodium chloride 50 mL IVPB  100 mg IntraVENous BID    vancomycin (VANCOCIN) 750 mg in 0.9% sodium chloride 250 mL (VIAL-MATE)  750 mg IntraVENous Q24H    piperacillin-tazobactam (ZOSYN) 2.25 g in 0.9% sodium chloride (MBP/ADV) 50 mL MBP  2.25 g IntraVENous Q6H       Allergies: Contrast dye [iodine]    Temp (24hrs), Av.5 °F (35.3 °C), Min:92.3 °F (33.5 °C), Max:97.8 °F (36.6 °C)    Visit Vitals  /68   Pulse 81   Temp 97.7 °F (36.5 °C)   Resp 25   Ht 5' 4\" (1.626 m)   Wt 78.5 kg (173 lb)   SpO2 99%   BMI 29.70 kg/m²       ROS: 12 point ROS obtained in details. Pertinent positives as mentioned in HPI,   otherwise negative    Physical Exam:    General: Well developed, well nourished female laying on the bed/sitting on the  bed AAOx3 in no acute distress. General:   awake alert and oriented   HEENT:  Normocephalic, atraumatic, PERRL, EOMI, no scleral icterus or pallor; no conjunctival hemmohage;  nasal and oral mucous are moist and without evidence of lesions. No thrush. Dentition good. Neck supple, no bruits. Lymph Nodes:   not examined   Lungs:   non-labored, bilateral chest movements equal.    Heart:  Irregular rate; , no edema   Abdomen:  soft, non-distended, active bowel sounds, no hepatomegaly, no splenomegaly.  non-tender   Genitourinary:  deferred   Extremities:   no clubbing, cyanosis; no joint effusions or swelling;  muscle mass appropriate for age   Neurologic:  No gross focal sensory abnormalities; does not follow commands - detailed neuro eval. Not feasible                        Skin:  No rash or ulcers noted   Back:  no sacral decubiti per report     Psychiatric:  Calm, unable to assess         Labs: Results:   Chemistry Recent Labs     10/01/21  0540 21  1415 21  0215 21  1045 21  1045   * 99 62*   < > 74    145 146*   < > 145   K 3.9 4.0 4.1   < > 4.3   * 115* 116*   < > 112*   CO2 22 22 22   < > 27   BUN 21* 20* 21*   < > 23*   CREA 1.60* 1.53* 1.51*   < > 1.61*   CA 8.6 8.9 8.4*   < > 9.3   AGAP 7 8 8   < > 6   BUCR 13 13 14   < > 14   AP  --  64  --   --  81   TP  --  6.7  --   --  8.4*   ALB  --  2.6*  --   --  3.2*   GLOB  --  4.1*  --   --  5.2*   AGRAT  --  0.6*  --   --  0.6*    < > = values in this interval not displayed. CBC w/Diff Recent Labs     09/30/21  0215 09/29/21  1045   WBC 4.8 4.9   RBC 3.80* 4.52   HGB 11.1* 13.0   HCT 32.8* 39.2    276   GRANS 57 66   LYMPH 28 22   EOS 3 5      Microbiology Recent Labs     09/29/21  1130 09/29/21  1100   CULT No growth (<1,000 CFU/ML) NO GROWTH 2 DAYS  CULTURE IN PROGRESS,FURTHER UPDATES TO FOLLOW  [facility]. RADIOLOGY:    All available imaging studies/reports in [facility] for this admission were reviewed      Disclaimer: Sections of this note are dictated utilizing voice recognition software, which may have resulted in some phonetic based errors in grammar and contents. Even though attempts were made to correct all the mistakes, some may have been missed, and remained in the body of the document. If questions arise, please contact our department.     Dr. Inocente Rolle, Infectious Disease Specialist  [phone]  October 1, 2021  3:03 PM

## 2021-10-01 NOTE — PROGRESS NOTES
Reason for Admission:  Sepsis ([facility] [address] [A41.9]  Sinus pause [I45.5]                 RUR Score:    21%            Plan for utilizing home health:    No                      Likelihood of Readmission:   LOW                         Transition of Care Plan:              Initial assessment completed. Cognitive status of patient: non verbal.     Face sheet information confirmed:  yes. The patient designates Carlo Merritt and Starla Mc to participate in her discharge plan and to receive any needed information. This patient lives in a long term care nursing facility. Patient is not able to navigate steps as needed. Prior to hospitalization, patient was considered to be independent with ADLs/IADLS : no . If not independent,  patient needs assist with : dressing, bathing, food preparation, cooking, toileting, grooming and self feeding    Patient has a current ACP document on file: no      Healthcare Decision Maker:     Click here to complete [address] including selection of the [address] Relationship (ie \"Primary\")    The medical BLS stretcher will be available to transport patient home upon discharge. The patient already has all needed medical equipment available in the nursing home. Patient is not currently active with home health. Patient has not stayed in a skilled nursing facility or rehab. This patient is on dialysis :no    Currently, the discharge plan is for the pt to return LTC at [address] [place] [address]. The patient states that she can obtain her medications from the pharmacy, and take her medications as directed. Patient's current insurance is [employer] and Medicaid. Care Management Interventions  PCP Verified by CM:  Yes  Mode of Transport at Discharge: BLS  Transition of Care Consult (CM Consult): Discharge Planning  Physical Therapy Consult: No  Occupational Therapy Consult: No  Support Systems: Other Family Member(s)  Confirm Follow Up Transport: Other (see comment) (stretcher)  Discharge Location  Discharge Placement: [address] RN BSN  Care Manager  [phone]

## 2021-10-01 NOTE — PROGRESS NOTES
conducted an initial consultation and Spiritual Assessment for Raynald Closs, who is a 80 y.o.,female. Patients Primary Language is: [place]. According to the patients EMR Jew Affiliation is: [place].     The reason the Patient came to the hospital is:   Patient Active Problem List    Diagnosis Date Noted    Sinus pause 09/30/2021    Sepsis ([facility] [address] 09/29/2021    Hypertensive urgency 07/04/2021    HTN (hypertension), malignant 07/03/2021    Severe obesity ([facility] [address] 02/06/2020    Acute labyrinthitis 10/28/2014    Urge incontinence 03/27/2014        The  provided the following Interventions:   attempted to Initiate a relationship of care and support with patient in room [phone] today but found the patient non responsive at present and does not communicate. Nurse shared that patient was made DNR yesterday with family and that today the family may need more support as plans may be discussed to go further due to patient status. There is an order for DNR  There is no advance directive on file for this person at this time. Provided information about Spiritual Care Services. Offered prayer and assurance of continued prayers on patients behalf. Assessment:  Patient does not have any Spiritism/cultural needs that will affect patients preferences in health care. There are no further spiritual or Spiritism issues which require Spiritual Care Services interventions at this time. Plan:  Chaplains will continue to follow and will provide pastoral care on an as needed/requested basis    . Vinicius Bland   Spiritual Care   [phone]

## 2021-10-01 NOTE — PROGRESS NOTES
1114- [facility] Will assume care of the patient. 0800- Per night nurse patient has had at least 4 episodes of bradycardia requiring backup external pacing. On assessment pt moans to localizes to pain but does not open her eyes or respond to verbal commands. Pt currently has intrinsic SR rate 70's. External pads on, rate set at 40 bmp and at 60mV.     0900- Bradycardic episode seen on monitor. Pt HR slowed then back-up pacing started and lasting 13 beats. 0930- Patient has had 2 additional episodes of external pacing lasting 5-10 beats. Pt now normothermic. Awais hugger turned off.     1200- Pt temperature has slowly decreased since removing warming blanket. Now 97.3 F rectally. Will resume warming with awais hugger. 1600- Pt temp stable at 39.6 C. Awais hugger removed. 1900- Report given to Gisele oJsue RN.

## 2021-10-01 NOTE — PROGRESS NOTES
[facility]: [phone]  [facility]: [phone]     Patient Name: Shukri Arias  YOB: 1934    Date of Progress Note: 10/1/21   Reason for Consult: establish goals of care  Requesting Provider: Jamilah Landaverde MD   Primary Care Physician: Elliot Poe MD      SUMMARY:   Shukri Arias is a 80 y.o. female with a past history of dementia, DM2, Hep C, who was admitted on 9/29/2021 from [facility] with a diagnosis of AMS and decreased nutritional intake. Current medical issues leading to Palliative Medicine involvement include: establish goals of care. CHIEF COMPLAINT: hypothermia and sinus bradycardia    HPI/SUBJECTIVE:    Patient is an 20-year-old -American female who came in from [facility] with altered mental status times several days, dehydration and poor appetite. She had recently lost her son about 4 months ago and family reports that she has been declining since then. Patient has some level of Alzheimer's dementia at baseline but had been fairly dependent with her ADLs and somewhat able to communicate, she recently has had several falls at the nursing home. While hospitalized patient developed sinus bradycardia and hypoglycemia. She became hypothermic and mental status continued to decline. 10/1/21:  Pt continues to have frequent periods of sinus pauses and hypothermia. Remains on the warming blanket and external pacer/fribulator    The patient is:   [] Verbal and participatory  [x] Non-participatory due to:  Minimally responsive, Dementia at baseline     GOALS OF CARE:  Patient/Health Care Proxy Stated Goals: Prolong life      TREATMENT PREFERENCES:   Code Status: DNR         PALLIATIVE DIAGNOSES:   1. Goals of care/ACP  2. Bradycardia  3. Altered mental status  4. Debility       PLAN:   10/1/21:   This NP in to see patient with Verner Aw RN pt is not responsive and appears in mild to moderate distress with facial grimacing. Family coming in today and our team will reach out to support for ongoing discussions about escalation of care and continuation of current treatments which are serving only to prolong her dying process. At this time patient remains a DNR/DNI POST completed and on file   Addendum: In the afternoon this NP and Mimi Rosario RN in to see patient at the bedside she was accompanied by her niece in law Fina Bruner and other family member. Discussed with family my conversation with the cardiologist regarding inability to escalate care for pacemaker placement. Also discussed the burden of external pacing and frequency of patient being shocked by this process. They are understanding that there is the chance that patient may not be able to endure this treatment and it could be prolonging her suffering. Tin Lezama at this time was not prepared to move patient to comfort care but understands that if the external pacer becomes more burdensome cardiology may make the decision to turn it off. She is in agreement with this decision. This was also discussed with Kassie Lewis MD who will be here this weekend in the event that family makes the decision to move the patient to comfort care. 1.   Goals of care/ACP  This NP in to see the patient at the bedside she was accompanied by her [facility] and niece in law Fina Bruner who is also reported to be her secondary MPOA. She states that her  is the patient's nephew and the primary MPOA, Enrique Foley. Discussion regarding burdens and benefits of ongoing treatments, CPR and intubation. Mrs Jere Fry expressed that she would not want to see her aunt go through any suffering if it were not a road to recovery. We then contacted several other family members including her  and I explained the current situation and reiterated the burdens of CPR and intubation.  They were all in agreement that when her natural time comes and her heart stops to let her go and do not attempt CPR or place her on a ventilator. A POST was completed for DNR/DNI limited interventions. I did not address feeding tubes at this time. 2.   Bradycardia  Pt developed frequent prolonged pauses and cardiology placed her on an external defibrillation device. 3.   Altered mental status  Minimally responsive which is a change from her baseline dementia status. 4.   Debility  Current PPS is around 20% patient is bed bound, unable to do or interact in any meaningful way, requires total care for all ADL's, minimal to no oral intake. She has a very poor short and long term prognosis. 5.  Initial consult note routed to primary continuity provider  6. Communicated plan of care with: Palliative IDT      Advance Care Planning:  [] The [facility] Interdisciplinary Team has updated the ACP Navigator with [address] and Patient Capacity    Primary Decision Maker ([address]): Portia Nicole (nephew)    Medical Interventions: Limited additional interventions   Other Instructions:         As far as possible, the palliative care team has discussed with patient / health care proxy about goals of care / treatment preferences for patient.          HISTORY:     History obtained from: Chart review and family interview   Active Problems:    Sepsis ([address] (9/29/2021)      Sinus pause (9/30/2021)      Past Medical History:   Diagnosis Date    Anxiety     Arthritis     Dementia ([address]     Diabetes ([address]     Edema of extremities     Falls     Hematologic disorder     Hepatitis C carrier ([address]     Hypercholesterolemia     Hypertension     Insomnia     Labyrinthitis     Menopause     Primary osteoarthritis of knees, bilateral     Urge incontinence     Vitamin D deficiency       Past Surgical History:   Procedure Laterality Date    HX HYSTERECTOMY  1980    HX OTHER SURGICAL  2007    Lap Band for weight loss      Family History   Problem Relation Age of Onset    Breast Cancer Sister 80    Ovarian Cancer Sister 28        30s     History reviewed, no pertinent family history. Social History     Tobacco Use    Smoking status: Never Smoker    Smokeless tobacco: Never Used   Substance Use Topics    Alcohol use: No     Allergies   Allergen Reactions    Contrast Dye [Iodine] Other (comments)     Neurological reaction      Current Facility-Administered Medications   Medication Dose Route Frequency    insulin lispro (HUMALOG) injection   SubCUTAneous Q6H    hydrALAZINE (APRESOLINE) 20 mg/mL injection 10 mg  10 mg IntraVENous Q6H PRN    sodium chloride (NS) flush 5-40 mL  5-40 mL IntraVENous Q8H    sodium chloride (NS) flush 5-40 mL  5-40 mL IntraVENous PRN    acetaminophen (TYLENOL) tablet 650 mg  650 mg Oral Q6H PRN    Or    acetaminophen (TYLENOL) suppository 650 mg  650 mg Rectal Q6H PRN    polyethylene glycol (MIRALAX) packet 17 g  17 g Oral DAILY PRN    famotidine (PF) (PEPCID) 20 mg in 0.9% sodium chloride 10 mL injection  20 mg IntraVENous DAILY    aspirin (ASA) suppository 300 mg  300 mg Rectal DAILY    albuterol-ipratropium (DUO-NEB) 2.5 MG-0.5 MG/3 ML  3 mL Nebulization Q4H PRN    dextrose 5 % - 0.45% NaCl infusion  100 mL/hr IntraVENous CONTINUOUS    nitroglycerin (NITROBID) 2 % ointment 1 Inch  1 Inch Topical Q6H    glucose chewable tablet 16 g  16 g Oral PRN    glucagon (GLUCAGEN) injection 1 mg  1 mg IntraMUSCular PRN    dextrose (D50W) injection syrg 25 g  50 mL IntraVENous PRN    hydrocortisone Sod Succ (PF) (SOLU-CORTEF) injection 100 mg  100 mg IntraVENous Q8H    thiamine (B-1) 100 mg in 0.9% sodium chloride 50 mL IVPB  100 mg IntraVENous BID    vancomycin (VANCOCIN) 750 mg in 0.9% sodium chloride 250 mL (VIAL-MATE)  750 mg IntraVENous Q24H          Clinical Pain Assessment (nonverbal scale for nonverbal patients):        Activity (Movement): Lying quietly, normal position    Duration: for how long has pt been experiencing pain (e.g., 2 days, 1 month, years)  Frequency: how often pain is an issue (e.g., several times per day, once every few days, constant)     FUNCTIONAL ASSESSMENT:     Palliative Performance Scale (PPS):  PPS: 20    ECOG  ECOG Status : Completely disabled     PSYCHOSOCIAL/SPIRITUAL SCREENING:      Any spiritual / [employer] concerns:  [] Yes /  [x] No    Caregiver Burnout:  [] Yes /  [x] No /  [] No Caregiver Present      Anticipatory grief assessment:   [x] Normal  / [] Maladaptive        REVIEW OF SYSTEMS:     Systems: constitutional, ears/nose/mouth/throat, respiratory, gastrointestinal, genitourinary, musculoskeletal, integumentary, neurologic, psychiatric, endocrine. Positive findings noted below. Modified ESAS Completed by: provider                                       Positive and pertinent negative findings in ROS are noted above in HPI. The following systems were [x] reviewed / [] unable to be reviewed as noted in HPI  Other findings are noted below. PHYSICAL EXAM:     Constitutional: minimally responsive to stimulation  Eyes: pinpoint   ENMT: no nasal discharge, moist mucous membranes  Cardiovascular: bradycardia with frequent pauses  Respiratory: breathing not labored, on 02 n/c  Gastrointestinal: soft non-tender, +bowel sounds  Musculoskeletal: no deformity, no tenderness to palpation  Skin: hypothermia on warming blanket   Neurologic: minimally responsive     Other: Wt Readings from Last 3 Encounters:   09/30/21 78.5 kg (173 lb)   07/07/21 78.5 kg (173 lb)   06/29/21 85.7 kg (189 lb)     Blood pressure (!) 154/87, pulse 77, temperature 97.9 °F (36.6 °C), resp. rate 23, height 5' 4\" (1.626 m), weight 78.5 kg (173 lb), SpO2 100 %.   Pain:  Pain Scale 1: Adult Nonverbal Pain Scale  Pain Intensity 1: 0              Pain Intervention(s) 1: Medication (see MAR)       LAB AND IMAGING FINDINGS:     Lab Results   Component Value Date/Time    WBC 4.5 (L) 10/01/2021 05:40 AM    HGB 10.1 (L) 10/01/2021 05:40 AM    PLATELET 531 59/33/3003 05:40 AM     Lab Results   Component Value Date/Time    Sodium 145 10/01/2021 05:40 AM    Potassium 3.9 10/01/2021 05:40 AM    Chloride 116 (H) 10/01/2021 05:40 AM    CO2 22 10/01/2021 05:40 AM    BUN 21 (H) 10/01/2021 05:40 AM    Creatinine 1.60 (H) 10/01/2021 05:40 AM    Calcium 8.6 10/01/2021 05:40 AM    Magnesium 2.1 10/01/2021 05:40 AM    Phosphorus 3.5 10/01/2021 05:40 AM      Lab Results   Component Value Date/Time    Alk. phosphatase 64 09/30/2021 02:15 PM    Protein, total 6.7 09/30/2021 02:15 PM    Albumin 2.6 (L) 09/30/2021 02:15 PM    Globulin 4.1 (H) 09/30/2021 02:15 PM     Lab Results   Component Value Date/Time    INR 1.0 09/29/2021 10:45 AM    Prothrombin time 12.7 09/29/2021 10:45 AM      No results found for: IRON, FE, TIBC, IBCT, PSAT, FERR   No results found for: PH, PCO2, PO2  No components found for: [place]   Lab Results   Component Value Date/Time    CK 30 10/01/2021 05:40 AM    CK - MB 1.6 10/01/2021 05:40 AM              Total time: 25 minutes   Counseling / coordination time, spent as noted above:   > 50% counseling / coordination:  Time spent in direct consultation with the patient, medical team, and family     Prolonged service was provided for  []30 min   []75 min in face to face time in the presence of the patient, spent as noted above. Time Start:   Time End:     Disclaimer: Sections of this note are dictated using utilizing voice recognition software, which may have resulted in some phonetic based errors in grammar and contents. Even though attempts were made to correct all the mistakes, some may have been missed, and remained in the body of the document. If questions arise, please contact our department.

## 2021-10-02 NOTE — PROGRESS NOTES
Problem: Patient Education: Go to Patient Education Activity  Goal: Patient/Family Education  Outcome: Progressing Towards Goal     Problem: Nutrition Deficit  Goal: *Optimize nutritional status  Outcome: Not Progressing Towards Goal     Problem: Arrhythmia Pathway (Adult)  Goal: *Absence of arrhythmia  Outcome: Not Progressing Towards Goal

## 2021-10-02 NOTE — PROGRESS NOTES
11:00  Dr. Jeromy Lion at bedside. 11:57 Pt's niece, Roena Gosselin at bedside. POA : Jose Hope [phone]  Herberth Ponce, son of POAs. [phone]  13:14  Pt. ene'ed down and external transcutaneous pacing initiated. Lasted 16 seconds. Pt currently sinus rhythm with 1st deg AVB  13:22  Pt with 28 second run of 2nd degree AV block type 2  15:49  Pt with 21 second run of 2nd degree heart block type 2.  18:20  Dr. Jeromy Lion notified of pt's urinary output of 25-30 ml/hr for the last 4 hours. No new orders at this time. 19:00  Bedside and Verbal shift change report given to Jamee Bear RN (oncoming nurse) by Tam Herring RN (offgoing nurse). Report included the following information SBAR, Kardex, Procedure Summary, Intake/Output, MAR, Recent Results and Cardiac Rhythm sinus rhythm with episodic Second degree AVB type 2 and Third deg AV Block with transcutaneous pacing. Lindsay Gold

## 2021-10-02 NOTE — PROGRESS NOTES
Hospitalist Progress Note    Subjective:   Daily Progress Note: 10/2/2021     Patient was seen in presence of nursing and medical power of . Patient got total of more than 10 shocks since last night per nursing. Patient still remains nonverbal and does not follow any verbal or tactile commands by opening her eyes. However, she moves her legs on tactile commands. She also has some involuntary eye movement. Her appetite remains poor. As per medical power of : Patient's cousin brother coming tomorrow morning and then they would like to start withdrawing the support and keep this patient as comfortable as possible.     Current Facility-Administered Medications   Medication Dose Route Frequency    insulin lispro (HUMALOG) injection   SubCUTAneous Q6H    hydrALAZINE (APRESOLINE) 20 mg/mL injection 10 mg  10 mg IntraVENous Q6H PRN    sodium chloride (NS) flush 5-40 mL  5-40 mL IntraVENous Q8H    sodium chloride (NS) flush 5-40 mL  5-40 mL IntraVENous PRN    acetaminophen (TYLENOL) tablet 650 mg  650 mg Oral Q6H PRN    Or    acetaminophen (TYLENOL) suppository 650 mg  650 mg Rectal Q6H PRN    polyethylene glycol (MIRALAX) packet 17 g  17 g Oral DAILY PRN    famotidine (PF) (PEPCID) 20 mg in 0.9% sodium chloride 10 mL injection  20 mg IntraVENous DAILY    aspirin (ASA) suppository 300 mg  300 mg Rectal DAILY    albuterol-ipratropium (DUO-NEB) 2.5 MG-0.5 MG/3 ML  3 mL Nebulization Q4H PRN    dextrose 5 % - 0.45% NaCl infusion  100 mL/hr IntraVENous CONTINUOUS    nitroglycerin (NITROBID) 2 % ointment 1 Inch  1 Inch Topical Q6H    glucose chewable tablet 16 g  16 g Oral PRN    glucagon (GLUCAGEN) injection 1 mg  1 mg IntraMUSCular PRN    dextrose (D50W) injection syrg 25 g  50 mL IntraVENous PRN    hydrocortisone Sod Succ (PF) (SOLU-CORTEF) injection 100 mg  100 mg IntraVENous Q8H    thiamine (B-1) 100 mg in 0.9% sodium chloride 50 mL IVPB  100 mg IntraVENous BID    vancomycin (VANCOCIN) 750 mg in 0.9% sodium chloride 250 mL (VIAL-MATE)  750 mg IntraVENous Q24H        Review of Systems  Review of systems not obtained due to patient factors. Objective:     Visit Vitals  BP (!) 164/84   Pulse 100   Temp 98.1 °F (36.7 °C)   Resp 21   Ht 5' 4\" (1.626 m)   Wt 78.5 kg (173 lb)   SpO2 99%   BMI 29.70 kg/m²      O2 Device: None (Room air)    Temp (24hrs), Av.2 °F (36.2 °C), Min:95.4 °F (35.2 °C), Max:98.4 °F (36.9 °C)      10/02 0701 - 10/02 1900  In: 0   Out: 168 [Urine:168]  1901 - 10/02 0700  In: 3600 [I.V.:3600]  Out: 1400 [Urine:1400]      General appearance -nonverbal, not responding to verbal commands. Moves lower extremities on tactile commands. Involuntary left arm movement present. Kendell hugger is in situ.   Chest -diminished air entry noted in bases, no wheezes, poor air entry  Heart - S1 and S2 normal, temporary external pacemaker in situ  Abdomen - soft, nontender, nondistended, Bowel sounds present  Neurological -not responding to verbal commands, withdraws to deep tactile stimuli  Extremities - no pitting pedal edema noted      Data Review    Recent Results (from the past 24 hour(s))   GLUCOSE, POC    Collection Time: 10/01/21  5:25 PM   Result Value Ref Range    Glucose (POC) 142 (H) 70 - 110 mg/dL   CULTURE, BLOOD    Collection Time: 10/01/21 10:40 PM    Specimen: Blood   Result Value Ref Range    Special Requests: NO SPECIAL REQUESTS      Culture result: NO GROWTH AFTER 6 HOURS     GLUCOSE, POC    Collection Time: 10/02/21 12:15 AM   Result Value Ref Range    Glucose (POC) 142 (H) 70 - 110 mg/dL   VANCOMYCIN, RANDOM    Collection Time: 10/02/21  5:22 AM   Result Value Ref Range    Vancomycin, random 17.2 5.0 - 40.0 UG/ML   GLUCOSE, POC    Collection Time: 10/02/21  5:59 AM   Result Value Ref Range    Glucose (POC) 132 (H) 70 - 110 mg/dL         Assessment/Plan:     Active Problems:    Sepsis ([address] (2021)      Sinus pause (2021)      Severe protein-calorie Respiratory distress malnutrition ([facility] [address] [facility] (10/1/2021)    ASSESSMENT:    1. Acute on chronic metabolic encephalopathy  2. Chronic encephalopathy due to Alzheimer's dementia  3. 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 Failure to thrive and poor appetite   5. Hypertension  6. Dyslipidemia  7. Moderate emphysema  8. Diabetes mellitus  9. Right thyroid nodule  10. Hypothermia    PLAN:    Continue external pacemaker until family members come from out of town in the morning  Continue Kendell hugger for #10  Continue insulin sliding scale for diabetes mellitus  Continue IV hydralazine as needed for hypertension  Guarded prognosis  Family is aware of guarded prognosis and they plan to withdraw support and put her on comfort measures tomorrow once all remaining family members visit her. Total time to take care of this patient was 30 minutes and more than 50% of time was spent counseling and coordinating care. Disclaimer: Sections of this note are dictated using utilizing voice recognition software, which may have resulted in some phonetic based errors in grammar and contents. Even though attempts were made to correct all the mistakes, some may have been missed, and remained in the body of the document. If questions arise, please contact our department. Bronchiolitis Bronchiolitis Respiratory distress Respiratory distress Respiratory distress Respiratory distress Respiratory distress Respiratory distress

## 2021-10-02 NOTE — PROGRESS NOTES
Bedside and Verbal shift change report received from  Nasrin Chaudhary Report included the following information SBAR, Kardex, [facility] and Recent Results. SITUATION:  Code Status: DNR  Reason for Admission: Sepsis ([facility] [A41.9]  Sinus pause [I45.5]  Hospital day: 3  Problem List:       Hospital Problems  Date Reviewed: 12/11/2017        Codes Class Noted POA    Severe protein-calorie malnutrition ([facility] (Chronic) ICD-10-CM: K94  ICD-9-CM: 262  10/1/2021 Yes        Sinus pause ICD-10-CM: I45.5  ICD-9-CM: 426.6  9/30/2021 Unknown        Sepsis ([facility] ICD-10-CM: A41.9  ICD-9-CM: 038.9, 995.91  9/29/2021 Unknown              BACKGROUND:   Past Medical History:   Past Medical History:   Diagnosis Date    Anxiety     Arthritis     Dementia ([facility]     Diabetes ([facility]     Edema of extremities     Falls     Hematologic disorder     Hepatitis C carrier ([facility]     Hypercholesterolemia     Hypertension     Insomnia     Labyrinthitis     Menopause     Primary osteoarthritis of knees, bilateral     Urge incontinence     Vitamin D deficiency      Patient taking anticoagulants yes   Patient has a defibrillator: external lifepack  History of shots   Isolation History NO     ASSESSMENT:  Changes in Assessment Throughout Shift: Patient was turned and repositioned Q2H. Cardiac rhythm - SR, External pacer back up two times between 07:00 and 19:14. Significant Changes in 24 hours: No significant changes.   Patient has Central Line: No,  Patient has Huggins Cath: Yes  Mobility Issues - Nonambulatory   IV's  Patent  Pending Tests - AM LABS    Last Vitals:  Vitals w/ MEWS Score (last day)     Date/Time MEWS Score Pulse Resp Temp BP Level of Consciousness SpO2    10/02/21 1800    83  22  97 °F (36.1 °C)  (!) 166/76    99 %    10/02/21 1700    76  18  97.2 °F (36.2 °C)  (!) 143/74    99 %    10/02/21 1600  1  75  20  97.2 °F (36.2 °C)  124/76  (!) Responds to Pain (2)  99 %    10/02/21 1516    93  22  97.2 °F (36.2 °C)  (!) 178/100   99 %    10/02/21 1400    91  24  97.3 °F (36.3 °C)  (!) 167/98    99 %    10/02/21 1300    (!) 101  23  97.5 °F (36.4 °C)  (!) 170/95    99 %    10/02/21 1200    (!) 104  22  97.9 °F (36.6 °C)  (!) 170/92    99 %    10/02/21 1100    100  21  98.1 °F (36.7 °C)  (!) 164/84    99 %    10/02/21 1000    (!) 106  29  97.9 °F (36.6 °C)  (!) 190/103    99 %    10/02/21 0900    90  19  97.5 °F (36.4 °C)  (!) 152/103    100 %    10/02/21 0800  2  89  26  98.1 °F (36.7 °C)  (!) 193/89  (!) Responds to Pain (2)  99 %    10/02/21 0700    88  23  97.7 °F (36.5 °C)  (!) 167/91    99 %    10/02/21 0607    84      (!) 173/91        10/02/21 0500  2  82  22  96.8 °F (36 °C)  (!) 156/91  (!) Responds to Pain (2)  98 %    10/02/21 0400  2  78  23  (!) 96.1 °F (35.6 °C)  (!) 172/90  (!) Responds to Pain (2)  99 %    10/02/21 0300    75  21  (!) 95.7 °F (35.4 °C)  (!) 173/101    100 %    10/02/21 0200    75  16  (!) 95.5 °F (35.3 °C)  (!) 160/111    99 %    10/02/21 0100  2  75  28  (!) 95.4 °F (35.2 °C)  (!) 193/85  (!) Responds to Pain (2)  99 %    10/02/21 0024    70      (!) 200/156        10/02/21 0000    72  20  (!) 95.7 °F (35.4 °C)  (!) 200/156    100 %    10/01/21 2300    71  21  (!) 96.1 °F (35.6 °C)  (!) 175/99    100 %    10/01/21 2200    71  24  (!) 96.4 °F (35.8 °C)  (!) 189/100    100 %    10/01/21 2157    72  25  (!) 96.4 °F (35.8 °C)      100 %    10/01/21 2100    76  23  97 °F (36.1 °C)  (!) 168/98    100 %    10/01/21 2000  2  76  23  97.3 °F (36.3 °C)  (!) 172/88  (!) Responds to Pain (2)  100 %    10/01/21 1900    69  25  97.7 °F (36.5 °C)  (!) 162/84    99 %    10/01/21 1800    81  14  98.1 °F (36.7 °C)  (!) 165/93    100 %    10/01/21 1700    88  23  98.2 °F (36.8 °C)  (!) 178/98    100 %    10/01/21 1608    78  22  98.4 °F (36.9 °C)  (!) 180/81    99 %    10/01/21 1600    94  24  98.4 °F (36.9 °C)  (!) 197/115    100 %    10/01/21 1530    76  21  98.4 °F (36.9 °C)      99 %    10/01/21 1500    77  24  98.4 °F (36.9 °C)  (!) 145/81    100 %    10/01/21 1400    83  27  98.1 °F (36.7 °C)  (!) 160/95    100 %    10/01/21 1300    76  22  97.5 °F (36.4 °C)  (!) 160/82    100 %    10/01/21 1200    71  21  97.3 °F (36.3 °C)  (!) 164/76    100 %    10/01/21 1100    82  23  97.5 °F (36.4 °C)  (!) 149/88    100 %    10/01/21 1000    89  23  97.9 °F (36.6 °C)  (!) 155/105    100 %    10/01/21 0900    77  23  97.9 °F (36.6 °C)  (!) 154/87    100 %    10/01/21 0800  2  80  23  98.1 °F (36.7 °C)  (!) 143/85  (!) Responds to Pain (2)  99 %    10/01/21 0700    78  23  97.9 °F (36.6 °C)  (!) 162/81    100 %    10/01/21 0600    81  25  97.7 °F (36.5 °C)  135/68    99 %    10/01/21 0500    80  24  97.2 °F (36.2 °C)  135/71    100 %    10/01/21 0405  3  87  30  97 °F (36.1 °C)  138/71  (!) Responds to Pain (2)  99 %    10/01/21 0400    87  28  97 °F (36.1 °C)      99 %    10/01/21 0300    85  23  97.2 °F (36.2 °C)  138/80    100 %    10/01/21 0215          134/82        10/01/21 0200  4  (!) 42  (!) 31  97.5 °F (36.4 °C)  (!) 171/98  (!) Responds to Pain (2)  100 %    10/01/21 0100  2  88  29  97.5 °F (36.4 °C)  (!) 143/91  (!) Responds to Pain (2)  99 %    10/01/21 0000  3  93  (!) 33  97.3 °F (36.3 °C)  (!) 145/84  (!) Responds to Pain (2)  100 %            PAIN    Pain Assessment    Pain Intensity 1: 0 (10/01/21 2000)         Pain Intervention(s) 1: Medication (see MAR)    Patient Stated Pain Goal: 0  Intervention effective: yes  Time of last intervention:   Reassessment Completed: yes   Other actions taken for pain: rest    Last 3 Weights:  Last 3 Recorded Weights in this Encounter    21 1114 21 1212   Weight: 78.5 kg (173 lb) 78.5 kg (173 lb)   Weight change:     INTAKE/OUPUT  Last three shifts:  10/01 07 - 10/02 1900  In: 7901 [I.V.:3750]  Out: 1523 [BAD]    RECOMMENDATIONS AND DISCHARGE PLANNING  Patient needs and requests: Family awaiting visitor from [place] before making patient comfort. Pending tests/procedures: AM LABS     Discharge plan for patient: TBD    Discharge planning Needs or Barriers: None  Estimated Discharge Date: TBD  Whiteboard in Patients Room updated: yes       \"HEALS\" SAFETY CHECK  A safety check occurred in the patient's room between off going nurse and oncoming nurse listed above. The safety check included the below items:    H  High Alert Medications Verify all high alert medication drips (heparin, PCA, etc.)  E  Equipment Suction is set up for ALL patients (with soledad)  Red plugs utilized for all equipment (IV pumps, etc.)  WOWs wiped down at end of shift. Room stocked with oxygen, suction, and other unit-specific supplies  A  Alarms Bed alarm is set for fall risk patients  Ensure chair alarm is in place and activated if patient is up in a chair  L  Lines Check IV for any infiltration  Huggins bag is empty if patient has a Huggins   Tubing and IV bags are labeled  S  Safety  Room is clean, patient is clean, and equipment is clean. Hallways are clear from equipment besides carts. Fall bracelet on for fall risk patients  Ensure room is clear and free of clutter  Suction is set up for ALL patients (with soledad)  Hallways are clear from equipment besides carts.    Isolation precautions followed, supplies available outside room, sign posted    Giovanny Kramer RN

## 2021-10-03 NOTE — PROGRESS NOTES
07:15  Received pt from Ariela Prince RN. Pt. Non-verbal.  Does not follow commands. Moves upper extremities. Moves covers off her upper body. IVF infusing at 50 ml/hr. Huggins in place. Pt. Attached to zoll for per transcutaneous pacing at back-up rate of 40 bpm and mA 60.    09:00  Defribrillator pads changed. Pt. With intact skin. 13:00  Family members and friend who traveled from [place] at bedside. Patient opened eyes and smiled at those around her bed. Smiled and nodded occasionally when lovingly joked with by family. 14:20  All family leaving - some will come by later this evening  15:00  Dr. Dario Vogel at bedside - aware of poor urinary output and hypertension. Increased hydralazine to 20 mg prn q 6 hrs for SBP greater than 180 mmHg. Palliative Care to discuss withdrawal of care with POA in am.  Pt with transcutaneous pacing spikes:    08:58 and 08:57  Pt. With trigeminal pacing spikes x 4 beats each. 16:36  Pt. With spike following 3rd degree heart block beat x 3 episodes  Pt with one transcutaneous pacing spike following a PVC at 11:56, 12:44, 13:11, 14:20, 14:21, 16:13, 16:15, 16:17, 16:27    18:55  Bear Hugger re-started at 38 deg C. For Temp less than 36 C.  19:00  Bedside and Verbal shift change report given to Ariela Prince RN (oncoming nurse) by Thomas Salazar Rn (offgoing nurse). Report included the following information SBAR, Kardex, Procedure Summary, Intake/Output, MAR, Recent Results and Cardiac Rhythm sinus rhythm with occasional PVCs, occasional transcutaneous pacing. Shine Whitten

## 2021-10-03 NOTE — PROGRESS NOTES
Bedside and Verbal shift change report received from [facility] to Albaro Fritz RN. Report included the following information SBAR, Kardex, MAR and Recent Results. SITUATION:  Code Status: DNR  Reason for Admission: [facility] ([facility] [A41.9]  Sinus pause [I45.5]  Hospital day: 4  Problem List:       Hospital Problems  Date Reviewed: 12/11/2017        Codes Class Noted POA    Severe protein-calorie malnutrition ([facility] (Chronic) ICD-10-CM: S03  ICD-9-CM: 262  10/1/2021 Yes        Sinus pause ICD-10-CM: I45.5  ICD-9-CM: 426.6  9/30/2021 Unknown        Sepsis ([facility] ICD-10-CM: A41.9  ICD-9-CM: 038.9, 995.91  9/29/2021 Unknown              BACKGROUND:   Past Medical History:   Past Medical History:   Diagnosis Date    Anxiety     Arthritis     Dementia ([facility]     Diabetes ([facility]     Edema of extremities     Falls     Hematologic disorder     Hepatitis C carrier ([facility]     Hypercholesterolemia     Hypertension     Insomnia     Labyrinthitis     Menopause     Primary osteoarthritis of knees, bilateral     Urge incontinence     Vitamin D deficiency       Patient taking anticoagulants no    Patient has a defibrillator: yes    History of shots NO for example, flu, pneumonia, tetanus   Isolation History NO for example, MRSA, CDiff    ASSESSMENT:  Changes in Assessment Throughout Shift: Had visitors today, very alert during this time. Multipule episodes of external pacing today. Hydralazine increased. Still hypertensive. Neuro status decline after visitors left.   Significant Changes in 24 hours :NO  Patient has Central Line: no   Patient has Huggins Cath: yes Reasons if yes: Per provider   Mobility Issues : Non Ambulatory   PT  IV Patency  OR Checklist  Pending Tests    Last Vitals:   10/03/21 1904  87  19  (!) 96.1 °F (35.6 °C)  (!) 187/97    99 %    10/03/21 1700  87  22  (!) 96.3 °F (35.7 °C)  (!) 186/94    99 %    10/03/21 1600  86  18  (!) 96.4 °F (35.8 °C)  (!) 163/89    99 %    10/03/21 1500  91  21 Sahil Dee ) 96.4 °F (35.8 °C)  (!) 205/102    100 %    10/03/21 1400  81  25  (!) 96.3 °F (35.7 °C)  (!) 178/86    99 %    10/03/21 1300  80  23  (!) 96.1 °F (35.6 °C)  (!) 172/91    99 %    10/03/21 1200  80  16  (!) 96.1 °F (35.6 °C)  (!) 150/89    99 %    10/03/21 1100  89  23  (!) 96.1 °F (35.6 °C)  (!) 173/98    99 %    10/03/21 1054  91  20  (!) 96.1 °F (35.6 °C)  (!) 190/103    99 %    10/03/21 1000  92  21  (!) 96.1 °F (35.6 °C)  (!) 176/159    99 %    10/03/21 0902  88  23  (!) 96.4 °F (35.8 °C)  (!) 178/94    99 %    10/03/21 0800  84  23  (!) 96.6 °F (35.9 °C)  (!) 184/95    99 %     PAIN    Pain Assessment    Pain Intensity 1: 0 (10/03/21 0600)    Visual Pain Assessment  Intervention effective: N/A  Time of last intervention: N/A   Reassessment Completed: N/A   Other actions taken for pain: N/A  Last 3 Weights:  Last 3 Recorded Weights in this Encounter    09/29/21 1114 09/30/21 1212   Weight: 78.5 kg (173 lb) 78.5 kg (173 lb)   Weight change:     INTAKE/OUPUT  Current Shift: No intake/output data recorded. Last three shifts: 10/02 0701 - 10/03 1900  In: 3080 [I.V.:3080]  Out: 5991 [Urine:1191]    RECOMMENDATIONS AND DISCHARGE PLANNING  Patient needs and requests: Patient in Non-Verbal.     Pending tests/procedures: None     Discharge plan for patient: Bob Sites / Palliative. Discharge planning Needs or Barriers: None    Estimated Discharge Date: TBD   Posted on Whiteboard in Patients Room: yes       \"HEALS\" SAFETY CHECK  A safety check occurred in the patient's room between off going nurse and oncoming nurse listed above. The safety check included the below items:    H  High Alert Medications Verify all high alert medication drips (heparin, PCA, etc.)  E  Equipment Suction is set up for ALL patients (with soledad)  Red plugs utilized for all equipment (IV pumps, etc.)  WOWs wiped down at end of shift.   Room stocked with oxygen, suction, and other unit-specific supplies  A  Alarms Bed alarm is set for fall risk patients  Ensure chair alarm is in place and activated if patient is up in a chair  L  Lines Check IV for any infiltration  Huggins bag is empty if patient has a Huggins   Tubing and IV bags are labeled  S  Safety  Room is clean, patient is clean, and equipment is clean. Hallways are clear from equipment besides carts. Fall bracelet on for fall risk patients  Ensure room is clear and free of clutter  Suction is set up for ALL patients (with yanker)  Hallways are clear from equipment besides carts.    Isolation precautions followed, supplies available outside room, sign posted    Gavi Spain RN

## 2021-10-03 NOTE — PROGRESS NOTES
Bedside and Verbal shift change report given to Nasrin Chaudhary, RN by Tony Almanza, RN Report included the following information SBAR, Kardex, [facility] and Recent Results. SITUATION:  Code Status: DNR  Reason for Admission: [facility]epsis ([facility] [A41.9]  Sinus pause [I45.5]  Hospital day: 4  Problem List:       Hospital Problems  Date Reviewed: 12/11/2017        Codes Class Noted POA    Severe protein-calorie malnutrition ([facility] (Chronic) ICD-10-CM: D87  ICD-9-CM: 262  10/1/2021 Yes        Sinus pause ICD-10-CM: I45.5  ICD-9-CM: 426.6  9/30/2021 Unknown        Sepsis ([facility] ICD-10-CM: A41.9  ICD-9-CM: 038.9, 995.91  9/29/2021 Unknown              BACKGROUND:   Past Medical History:   Past Medical History:   Diagnosis Date    Anxiety     Arthritis     Dementia ([facility]     Diabetes ([facility]     Edema of extremities     Falls     Hematologic disorder     Hepatitis C carrier ([facility]     Hypercholesterolemia     Hypertension     Insomnia     Labyrinthitis     Menopause     Primary osteoarthritis of knees, bilateral     Urge incontinence     Vitamin D deficiency       Patient taking anticoagulants yes    Patient has a defibrillator: Yes External Pacer   History of shots : Unknown   Isolation History Unknownf    ASSESSMENT:  Changes in Assessment Throughout Shift:   19:00 - 05:00 - patient has remained in normal sinus rhythm. Hypertensive -200's , DBP >100. Follows some commands on demand but intermittently. Has moved her upper body around the bed as well as removing her blankets and sheets from her upper body. 06:00 - Patient's eyes open spontaneously to voice, tracks with eyes, mouths words, follows simple commands. Answers yes/no questions.    Significant Changes in 24 hours : No back up pacing since 17:00's  Patient has Central Line: no   Patient has Huggins Cath: Yes, 20 mL/hr  Mobility Issues - non Ambulatory  PT  IV Patency  OR Checklist  Pending Tests    Last Vitals:  Vitals w/ MEWS Score (last day) Date/Time MEWS Score Pulse Resp Temp BP Level of Consciousness SpO2    10/03/21 0600  1  80  20  (!) 95.9 °F (35.5 °C)  (!) 155/82  Alert (0)  99 %    10/03/21 0540    85      (!) 168/97        10/03/21 0500    85  19  (!) 95.4 °F (35.2 °C)  (!) 168/97    99 %    10/03/21 0400  1  88  20  (!) 95.4 °F (35.2 °C)  (!) 179/104  Responds to Voice (1)  99 %    10/03/21 0300    88  15  (!) 95.4 °F (35.2 °C)  (!) 172/98    99 %    10/03/21 0241    80  17  (!) 95.5 °F (35.3 °C)  (!) 154/85    99 %    10/03/21 0200    83  20  (!) 95.7 °F (35.4 °C)  (!) 196/117    100 %    10/03/21 0106    87  24  (!) 95.9 °F (35.5 °C)  (!) 198/116    99 %    10/03/21 0100    87  23  (!) 95.9 °F (35.5 °C)  (!) 209/112    99 %    10/03/21 0000  2  86  21  (!) 95.9 °F (35.5 °C)  (!) 189/101  (!) Responds to Pain (2)  99 %    10/02/21 2300    85  20  (!) 95.9 °F (35.5 °C)  (!) 174/92    100 %    10/02/21 2200    86  22  (!) 95.9 °F (35.5 °C)  (!) 167/93    99 %    10/02/21 2100  2  83  23  (!) 96.4 °F (35.8 °C)  (!) 170/101  (!) Responds to Pain (2)  100 %    10/02/21 2002    93  29  96.8 °F (36 °C)  (!) 214/113    100 %    10/02/21 2000    88  25  96.8 °F (36 °C)  (!) 194/119    99 %    10/02/21 1900    88  19  97 °F (36.1 °C)  (!) 202/132    100 %    10/02/21 1800    83  22  97 °F (36.1 °C)  (!) 166/76    99 %    10/02/21 1700    76  18  97.2 °F (36.2 °C)  (!) 143/74    99 %    10/02/21 1600  1  75  20  97.2 °F (36.2 °C)  124/76  (!) Responds to Pain (2)  99 %    10/02/21 1516    93  22  97.2 °F (36.2 °C)  (!) 178/100    99 %    10/02/21 1400    91  24  97.3 °F (36.3 °C)  (!) 167/98    99 %    10/02/21 1300    (!) 101  23  97.5 °F (36.4 °C)  (!) 170/95    99 %    10/02/21 1200    (!) 104  22  97.9 °F (36.6 °C)  (!) 170/92    99 %    10/02/21 1100    100  21  98.1 °F (36.7 °C)  (!) 164/84    99 %    10/02/21 1000    (!) 106  29  97.9 °F (36.6 °C)  (!) 190/103    99 %    10/02/21 0900    90  19  97.5 °F (36.4 °C)  (!) 152/103    100 %    10/02/21 0800  2  89  26  98.1 °F (36.7 °C)  (!) 193/89  (!) Responds to Pain (2)  99 %    10/02/21 0700    88  23  97.7 °F (36.5 °C)  (!) 167/91    99 %    10/02/21 0607    84      (!) 173/91        10/02/21 0500  2  82  22  96.8 °F (36 °C)  (!) 156/91  (!) Responds to Pain (2)  98 %    10/02/21 0400  2  78  23  (!) 96.1 °F (35.6 °C)  (!) 172/90  (!) Responds to Pain (2)  99 %    10/02/21 0300    75  21  (!) 95.7 °F (35.4 °C)  (!) 173/101    100 %    10/02/21 0200    75  16  (!) 95.5 °F (35.3 °C)  (!) 160/111    99 %    10/02/21 0100  2  75  28  (!) 95.4 °F (35.2 °C)  (!) 193/85  (!) Responds to Pain (2)  99 %    10/02/21 0024    70      (!) 200/156        10/02/21 0000    72  20  (!) 95.7 °F (35.4 °C)  (!) 200/156    100 %            PAIN    Pain Assessment    Pain Intensity 1: 0 (10/03/21 0600)         Pain Intervention(s) 1: Medication (see MAR)    Patient Stated Pain Goal: 0  Intervention effective: yes  Time of last intervention:   Reassessment Completed: yes   Other actions taken for pain: complaint of headache relieved by acetaminophen 650 mg. Last 3 Weights:  Last 3 Recorded Weights in this Encounter    09/29/21 1114 09/30/21 1212   Weight: 78.5 kg (173 lb) 78.5 kg (173 lb)   Weight change:     INTAKE/OUPUT    Current Shift: No intake/output data recorded. Last three shifts: 10/01 1901 - 10/03 0700  In: 3930 [I.V.:3930]  Out: 1543 [WXSBQ:5847]    RECOMMENDATIONS AND DISCHARGE PLANNING  Patient needs and requests: Hoping to go home today. Pending tests/procedures:     Discharge plan for patient: TBD    Discharge planning Needs or Barriers: NONE    Estimated Discharge Date: TBD  Posted on Whiteboard in Patients Room: yes    Education Documentation  No documentation found. Education Comments  No comments found.             \"HEALS\" SAFETY CHECK  A safety check occurred in the patient's room between off going nurse and oncoming nurse listed above.    The safety check included the below items:    H  High Alert Medications Verify all high alert medication drips (heparin, PCA, etc.)  E  Equipment Suction is set up for ALL patients (with soledad)  Red plugs utilized for all equipment (IV pumps, etc.)  WOWs wiped down at end of shift. Room stocked with oxygen, suction, and other unit-specific supplies  A  Alarms Bed alarm is set for fall risk patients  Ensure chair alarm is in place and activated if patient is up in a chair  L  Lines Check IV for any infiltration  Huggins bag is empty if patient has a Huggins   Tubing and IV bags are labeled  S  Safety  Room is clean, patient is clean, and equipment is clean. Hallways are clear from equipment besides carts. Fall bracelet on for fall risk patients  Ensure room is clear and free of clutter  Suction is set up for ALL patients (with soledad)  Hallways are clear from equipment besides carts.    Isolation precautions followed, supplies available outside room, sign posted    Jodie Gary RN

## 2021-10-03 NOTE — PROGRESS NOTES
Hospitalist Progress Note    Subjective:   Daily Progress Note: 10/3/2021     Patient is lying in bed in NAD. Appetite remains poor. Urine output remains poor per nursing. As per nursing: Patient open eyes and smiled at family earlier today. Current Facility-Administered Medications   Medication Dose Route Frequency    hydrALAZINE (APRESOLINE) 20 mg/mL injection 20 mg  20 mg IntraVENous Q6H PRN    insulin lispro (HUMALOG) injection   SubCUTAneous Q6H    sodium chloride (NS) flush 5-40 mL  5-40 mL IntraVENous Q8H    sodium chloride (NS) flush 5-40 mL  5-40 mL IntraVENous PRN    acetaminophen (TYLENOL) tablet 650 mg  650 mg Oral Q6H PRN    Or    acetaminophen (TYLENOL) suppository 650 mg  650 mg Rectal Q6H PRN    polyethylene glycol (MIRALAX) packet 17 g  17 g Oral DAILY PRN    famotidine (PF) (PEPCID) 20 mg in 0.9% sodium chloride 10 mL injection  20 mg IntraVENous DAILY    aspirin (ASA) suppository 300 mg  300 mg Rectal DAILY    albuterol-ipratropium (DUO-NEB) 2.5 MG-0.5 MG/3 ML  3 mL Nebulization Q4H PRN    dextrose 5 % - 0.45% NaCl infusion  100 mL/hr IntraVENous CONTINUOUS    nitroglycerin (NITROBID) 2 % ointment 1 Inch  1 Inch Topical Q6H    glucose chewable tablet 16 g  16 g Oral PRN    glucagon (GLUCAGEN) injection 1 mg  1 mg IntraMUSCular PRN    dextrose (D50W) injection syrg 25 g  50 mL IntraVENous PRN    hydrocortisone Sod Succ (PF) (SOLU-CORTEF) injection 100 mg  100 mg IntraVENous Q8H    thiamine (B-1) 100 mg in 0.9% sodium chloride 50 mL IVPB  100 mg IntraVENous BID        Review of Systems  Review of systems not obtained due to patient factors.     Objective:     Visit Vitals  BP (!) 178/86   Pulse 81   Temp (!) 96.3 °F (35.7 °C)   Resp 25   Ht 5' 4\" (1.626 m)   Wt 78.5 kg (173 lb)   SpO2 99%   BMI 29.70 kg/m²      O2 Device: None (Room air)    Temp (24hrs), Av.2 °F (35.7 °C), Min:95.4 °F (35.2 °C), Max:97.2 °F (36.2 °C)      10/03 0701 - 10/03 1900  In: 550 [I.V.:550]  Out: [id number]  10/01 1901 - 10/03 0700  In: 2690 [I.V.:3930]  Out: 5031 [[facility]:3891]    General appearance -nonverbal, moves her face on tactile commands today. Kendell hugger is in situ but patient is constantly trying to remove it. Chest -diminished air entry noted in bases, no wheezes, poor air entry  Heart - S1 and S2 normal, temporary external pacemaker in situ  Abdomen - soft, nontender, nondistended, Bowel sounds present  Neurological -not responding to verbal commands. Moves left upper extremity and both lower extremities on touch. Extremities - no pitting pedal edema noted    Data Review    Recent Results (from the past 24 hour(s))   GLUCOSE, POC    Collection Time: 10/02/21  6:06 PM   Result Value Ref Range    Glucose (POC) 156 (H) 70 - 110 mg/dL   GLUCOSE, POC    Collection Time: 10/02/21 11:45 PM   Result Value Ref Range    Glucose (POC) 144 (H) 70 - 110 mg/dL   GLUCOSE, POC    Collection Time: 10/03/21  5:45 AM   Result Value Ref Range    Glucose (POC) 145 (H) 70 - 110 mg/dL   GLUCOSE, POC    Collection Time: 10/03/21 12:55 PM   Result Value Ref Range    Glucose (POC) 157 (H) 70 - 110 mg/dL         Assessment/Plan:     Active Problems:    Sepsis ([address] (9/29/2021)      Sinus pause (9/30/2021)      Severe protein-calorie malnutrition ([address] (10/1/2021)    ASSESSMENT:    1. Acute on chronic metabolic encephalopathy, stable. 2.  Chronic encephalopathy due to Alzheimer's dementia  3. 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 Failure to thrive and poor appetite   5. Hypertension  6. Dyslipidemia  7. Moderate emphysema  8. Diabetes mellitus  9. Right thyroid nodule  10.   Hypothermia    PLAN:    Continue external pacemaker until family members come from out of town in the morning  Continue Kendell hugger for #10  Continue insulin sliding scale for diabetes mellitus  Continue increasing dose of IV hydralazine as needed for hypertension  Guarded prognosis  Can discontinue Kendell hugger as patient is very uncomfortable with it. Family is not available currently at bedside. However, they have talked to [facility] ICU nursing and they will be here tonight as well as tomorrow morning to withdraw the support in the morning. Total time to take care of this patient was 30 minutes and more than 50% of time was spent counseling and coordinating care. Disclaimer: Sections of this note are dictated using utilizing voice recognition software, which may have resulted in some phonetic based errors in grammar and contents. Even though attempts were made to correct all the mistakes, some may have been missed, and remained in the body of the document. If questions arise, please contact our department.

## 2021-10-04 NOTE — PROGRESS NOTES
Chart reviewed. Vitals reviewed. Labs reviewed. Blood culture 9/29 1 set positive for coagulase-negative Staphylococcus. Repeat blood culture 10/1-no growth  Interim events discussed with Dr. Kimberly Brown. Plans to transition patient to possible hospice. Imp:  No definitive clinical evidence of sepsis  Rec:  Agree with discontinuation of abx  Will sign off. F/u prn. Thanks. D/w dr. Vince Randolph. Time spent > 10 min.

## 2021-10-04 NOTE — PROGRESS NOTES
Problem: Falls - Risk of  Goal: *Absence of Falls  Description: Document Angel Sol Fall Risk and appropriate interventions in the flowsheet. Outcome: Progressing Towards Goal  Note: Fall Risk Interventions:       Mentation Interventions: Adequate sleep, hydration, pain control, Bed/chair exit alarm, Door open when patient unattended, Reorient patient, Room close to nurse's station, Toileting rounds, Update white board    Medication Interventions: Bed/chair exit alarm, Patient to call before getting OOB, Teach patient to arise slowly    Elimination Interventions: Bed/chair exit alarm, Call light in reach, Patient to call for help with toileting needs, Stay With Me (per policy), Toilet paper/wipes in reach, Toileting schedule/hourly rounds    History of Falls Interventions: Bed/chair exit alarm, Consult care management for discharge planning, Door open when patient unattended, Room close to nurse's station         Problem: Pressure Injury - Risk of  Goal: *Prevention of pressure injury  Description: Document John Scale and appropriate interventions in the flowsheet. Outcome: Progressing Towards Goal  Note: Pressure Injury Interventions:  Sensory Interventions: Assess changes in LOC, Assess need for specialty bed, Avoid rigorous massage over bony prominences, Check visual cues for pain, Float heels, Keep linens dry and wrinkle-free, Maintain/enhance activity level, Minimize linen layers, Monitor skin under medical devices, Pad between skin to skin, Pressure redistribution bed/mattress (bed type), Turn and reposition approx.  every two hours (pillows and wedges if needed), Use 30-degree side-lying position ([place] ICU bed)    Moisture Interventions: Absorbent underpads, Apply protective barrier, creams and emollients, Check for incontinence Q2 hours and as needed, Internal/External urinary devices, Maintain skin hydration (lotion/cream), Minimize layers, Moisture barrier    Activity Interventions: Assess need for specialty bed, Pressure redistribution bed/mattress(bed type)    Mobility Interventions: Float heels, HOB 30 degrees or less, Pressure redistribution bed/mattress (bed type), Suspension boots, Turn and reposition approx. every two hours(pillow and wedges)    Nutrition Interventions: Discuss nutritional consult with provider    Friction and Shear Interventions: Apply protective barrier, creams and emollients, Foam dressings/transparent film/skin sealants, HOB 30 degrees or less, Lift sheet, Lift team/patient mobility team, Minimize layers, Transferring/repositioning devices                Problem: Arrhythmia Pathway (Adult)  Goal: *Absence of arrhythmia  Outcome: Progressing Towards Goal  Note: Pt in NSR to First degree AV Block, transcutaneous pacemaker set at back up rate 40ppm 60mA, pts' current HR 82bpm- no transcutaneous pacing notified. Problem: Delirium Treatment  Goal: *Level of consciousness restored to baseline  Outcome: Progressing Towards Goal  Goal: *Level of environmental perceptions restored to baseline  Outcome: Progressing Towards Goal  Goal: *Sensory perception restored to baseline  Outcome: Progressing Towards Goal  Goal: *Emotional stability restored to baseline  Outcome: Progressing Towards Goal  Note: Pt resting quietly in bed, [employer] on. Goal: *Absence of falls  Outcome: Progressing Towards Goal  Goal: Interventions  Outcome: Progressing Towards Goal  Note: Reorientation, no evidence of learning, pt nonverbal with flat affect. Spontaneously opens eyes and moves BUE- not to command, no tracking with eyes.

## 2021-10-04 NOTE — PROGRESS NOTES
Bedside and Verbal shift change report given to Gisele Abdullahi RN (oncoming nurse) by Karely Oden RN (offgoing nurse). Report included the following information SBAR, Kardex, MAR and Recent Results. SITUATION:  Code Status: DNR  Reason for Admission: Sepsis ([facility] [A41.9]  Sinus pause [I45.5]  Hospital day: 5  Problem List:       Hospital Problems  Date Reviewed: 12/11/2017        Codes Class Noted POA    Severe protein-calorie malnutrition ([facility] (Chronic) ICD-10-CM: W78  ICD-9-CM: 262  10/1/2021 Yes        Sinus pause ICD-10-CM: I45.5  ICD-9-CM: 426.6  9/30/2021 Unknown        Sepsis ([facility] ICD-10-CM: A41.9  ICD-9-CM: 038.9, 995.91  9/29/2021 Unknown              BACKGROUND:   Past Medical History:   Past Medical History:   Diagnosis Date    Anxiety     Arthritis     Dementia ([facility]     Diabetes ([facility]     Edema of extremities     Falls     Hematologic disorder     Hepatitis C carrier ([facility]     Hypercholesterolemia     Hypertension     Insomnia     Labyrinthitis     Menopause     Primary osteoarthritis of knees, bilateral     Urge incontinence     Vitamin D deficiency      Patient taking anticoagulants yes   Patient has a defibrillator: transcutaneous pacing at back-up rate of 40 bpm and mA 60. History of shots NO    Isolation History NO for example, MRSA, CDiff    ASSESSMENT:  Changes in Assessment Throughout Shift:   20:00 - Patient moves upper extremities to cover her upper body, will move upper body to adjust self, position in bed. Non-Verbal, not fallowing commands, squeezes eyes and mouth shut during assessment. 21:00 - Huggins cath care completed. No acute changes. Breath sounds left Mid,Lower Severely diminished. 01:40 - Patients breathing more labored, chest expansion asymmetrical, Breath sounds Left mid/lower lobe Anterior, Posterior and Lateral : Absent.      01: 48 -  Paged [address] [place], [place]    01:54 - Complete heart block for 12.12 seconds rate 46 bpm,No pacing. Pt. Attached to zoll for per transcutaneous pacing at back-up rate of 40 bpm and mA 60.      01:55 - Spoke with Dr. Jessica Andres ordered. 03:32 - Patient sitting in bed eyes open, closed eyes when I walked into the room. 04:00 - Patient has had no acute changes will continue to monitor. 05:00-07:00 - Patient Bathed, medication administered as per MAR, bedding changed. Skin assessed for any changes. Pt had a small soft bowel movement. Respiratory status remains unchanged.        Significant Changes in 24 hours (for example, RR/code, fall)  Patient has Central Line: no   Patient has Huggins Cath: yes Reasons if yes: per provider    Mobility Issues - patient is bed bound  PT  IV Patency  Pending Tests    Last Vitals:  Vitals w/ MEWS Score (last day)     Date/Time MEWS Score Pulse Resp Temp BP Level of Consciousness SpO2    10/04/21 0613    79  18  (!) 96.1 °F (35.6 °C)  (!) 185/93    99 %    10/04/21 0600    78  20  (!) 96.3 °F (35.7 °C)  (!) 201/98    100 %    10/04/21 0500    85  22  (!) 96.1 °F (35.6 °C)  (!) 193/102    99 %    10/04/21 0400  2  81  21  (!) 95.7 °F (35.4 °C)  (!) 170/111  (!) Responds to Pain (2)  99 %    10/04/21 0300    85  21  (!) 95.9 °F (35.5 °C)  (!) 173/101    99 %    10/04/21 0200    80  20  (!) 96.3 °F (35.7 °C)  135/82    99 %    10/04/21 0100    89  21  (!) 96.3 °F (35.7 °C)  (!) 180/98    99 %    10/04/21 0000  2  87  22  (!) 96.6 °F (35.9 °C)  (!) 168/92  (!) Responds to Pain (2)  99 %    10/03/21 2340    85      (!) 188/96        10/03/21 2300    88  22  (!) 96.6 °F (35.9 °C)  (!) 188/96    99 %    10/03/21 2201    88  22  (!) 96.6 °F (35.9 °C)      99 %    10/03/21 2200      21  (!) 96.6 °F (35.9 °C)  (!) 188/90    99 %    10/03/21 2133    86  17  (!) 96.6 °F (35.9 °C)  (!) 173/88    99 %    10/03/21 2100    87  24  (!) 96.6 °F (35.9 °C)  (!) 204/107    99 %    10/03/21 2000  2  87  21  (!) 96.4 °F (35.8 °C)  (!) 188/104  Responds to Voice (1)  99 %    10/03/21 1904    87  19  (!) 96.1 °F (35.6 °C)  (!) 187/97    99 %    10/03/21 1900          (!) 203/116        10/03/21 1800    84  22  (!) 96.1 °F (35.6 °C)  (!) 182/96    99 %    10/03/21 1700    87  22  (!) 96.3 °F (35.7 °C)  (!) 186/94    99 %    10/03/21 1600    86  18  (!) 96.4 °F (35.8 °C)  (!) 163/89    99 %    10/03/21 1500    91  21  (!) 96.4 °F (35.8 °C)  (!) 205/102    100 %    10/03/21 1400    81  25  (!) 96.3 °F (35.7 °C)  (!) 178/86    99 %    10/03/21 1300    80  23  (!) 96.1 °F (35.6 °C)  (!) 172/91    99 %    10/03/21 1200    80  16  (!) 96.1 °F (35.6 °C)  (!) 150/89    99 %    10/03/21 1100    89  23  (!) 96.1 °F (35.6 °C)  (!) 173/98    99 %    10/03/21 1054    91  20  (!) 96.1 °F (35.6 °C)  (!) 190/103    99 %    10/03/21 1000    92  21  (!) 96.1 °F (35.6 °C)  (!) 176/159    99 %    10/03/21 0902    88  23  (!) 96.4 °F (35.8 °C)  (!) 178/94    99 %    10/03/21 0800    84  23  (!) 96.6 °F (35.9 °C)  (!) 184/95    99 %    10/03/21 0600  1  80  20  (!) 95.9 °F (35.5 °C)  (!) 155/82  Alert (0)  99 %    10/03/21 0540    85      (!) 168/97        10/03/21 0500    85  19  (!) 95.4 °F (35.2 °C)  (!) 168/97    99 %    10/03/21 0400  1  88  20  (!) 95.4 °F (35.2 °C)  (!) 179/104  Responds to Voice (1)  99 %    10/03/21 0300    88  15  (!) 95.4 °F (35.2 °C)  (!) 172/98    99 %    10/03/21 0241    80  17  (!) 95.5 °F (35.3 °C)  (!) 154/85    99 %    10/03/21 0200    83  20  (!) 95.7 °F (35.4 °C)  (!) 196/117    100 %    10/03/21 0106    87  24  (!) 95.9 °F (35.5 °C)  (!) 198/116    99 %    10/03/21 0100    87  23  (!) 95.9 °F (35.5 °C)  (!) 209/112    99 %    10/03/21 0000  2  86  21  (!) 95.9 °F (35.5 °C)  (!) 189/101  (!) Responds to Pain (2)  99 %            PAIN    Pain Assessment    Pain Intensity 1: 0 (10/04/21 0000)         Last 3 Weights:  Last 3 Recorded Weights in this Encounter    09/29/21 1114 09/30/21 1212   Weight: 78.5 kg (173 lb) 78.5 kg (173 lb)   Weight change:     INTAKE/OUPUT    Current Shift: 10/03 1901 - 10/04 0700  In: 1080 [I.V.:1080]  Out: 395 [Urine:395]    Last three shifts: 10/02 0701 - 10/03 1900  In: 8556 [I.V.:3880]  Out: 1924 [HXFAF:4886]    RECOMMENDATIONS AND DISCHARGE PLANNING  Patient needs and requests: patient is nonverbal.     Pending tests/procedures: None  Discharge plan for patient:TBD     Discharge planning Needs or Barriers: Awaiting family meeting. Estimated Discharge Date: TBD Posted on Whiteboard in Patients Room: yes       \"HEALS\" SAFETY CHECK  A safety check occurred in the patient's room between off going nurse and oncoming nurse listed above. The safety check included the below items:    H  High Alert Medications Verify all high alert medication drips (heparin, PCA, etc.)  E  Equipment Suction is set up for ALL patients (with soledad)  Red plugs utilized for all equipment (IV pumps, etc.)  WOWs wiped down at end of shift. Room stocked with oxygen, suction, and other unit-specific supplies  A  Alarms Bed alarm is set for fall risk patients  Ensure chair alarm is in place and activated if patient is up in a chair  L  Lines Check IV for any infiltration  Huggins bag is empty if patient has a Huggins   Tubing and IV bags are labeled  S  Safety  Room is clean, patient is clean, and equipment is clean. Hallways are clear from equipment besides carts. Fall bracelet on for fall risk patients  Ensure room is clear and free of clutter  Suction is set up for ALL patients (with soledad)  Hallways are clear from equipment besides carts.    Isolation precautions followed, supplies available outside room, sign posted    Jose Enrique Lisa RN

## 2021-10-04 NOTE — PROGRESS NOTES
Hospitalist Progress Note    Subjective:   Daily Progress Note: 10/4/2021     Patient is lying in bed in NAD. Mouth breathing present. Eyes are open. Follows some simple commands. Family member is at bedside. Did not eat or take any medication by mouth per family member. Current Facility-Administered Medications   Medication Dose Route Frequency    hydrALAZINE (APRESOLINE) 20 mg/mL injection 10 mg  10 mg IntraVENous Q6H PRN    LORazepam (INTENSOL) 2 mg/mL oral concentrate 0.5 mg  0.5 mg SubLINGual Q4H PRN    hyoscyamine SL (LEVSIN/SL) tablet 0.125 mg  0.125 mg SubLINGual Q4H PRN    scopolamine (TRANSDERM-SCOP) 1 mg over 3 days 1 Patch  1 Patch TransDERmal Q72H PRN    glycopyrrolate (ROBINUL) injection 0.2 mg  0.2 mg IntraVENous Q4H PRN    morphine (ROXANOL) 100 mg/5 mL (20 mg/mL) concentrated solution 5 mg  5 mg SubLINGual Q1H PRN    insulin lispro (HUMALOG) injection   SubCUTAneous Q6H    sodium chloride (NS) flush 5-40 mL  5-40 mL IntraVENous Q8H    sodium chloride (NS) flush 5-40 mL  5-40 mL IntraVENous PRN    acetaminophen (TYLENOL) tablet 650 mg  650 mg Oral Q6H PRN    Or    acetaminophen (TYLENOL) suppository 650 mg  650 mg Rectal Q6H PRN    polyethylene glycol (MIRALAX) packet 17 g  17 g Oral DAILY PRN    albuterol-ipratropium (DUO-NEB) 2.5 MG-0.5 MG/3 ML  3 mL Nebulization Q4H PRN    dextrose 5 % - 0.45% NaCl infusion  25 mL/hr IntraVENous CONTINUOUS    nitroglycerin (NITROBID) 2 % ointment 1 Inch  1 Inch Topical Q6H        Review of Systems  Review of systems not obtained due to patient factors.     Objective:     Visit Vitals  BP (!) 180/111   Pulse 100   Temp (!) 96.3 °F (35.7 °C)   Resp 21   Ht 5' 4\" (1.626 m)   Wt 78.5 kg (173 lb)   SpO2 99%   BMI 29.70 kg/m²      O2 Device: None (Room air)    Temp (24hrs), Av.2 °F (35.7 °C), Min:95.7 °F (35.4 °C), Max:96.6 °F (35.9 °C)      10/04 0701 - 10/04 1900  In: 400 [I.V.:400]  Out: 120 [Urine:120]  10/02 1901 - 10/04 0700  In: 4060 [I.V.:3910]  Out: 1153 [FQKRD:4796]    General appearance -nonverbal, eyes open, not in acute distress  Chest -diminished air entry noted in bases, no wheezes. Heart - S1 and S2 normal.  Abdomen - soft, nontender, nondistended, Bowel sounds present  Neurological -awake, moves left upper extremity and tries to squeeze my finger but weak handgrip. Extremities - no pitting pedal edema noted    Data Review    Recent Results (from the past 24 hour(s))   GLUCOSE, POC    Collection Time: 10/03/21  6:47 PM   Result Value Ref Range    Glucose (POC) 137 (H) 70 - 110 mg/dL   GLUCOSE, POC    Collection Time: 10/03/21 11:30 PM   Result Value Ref Range    Glucose (POC) 148 (H) 70 - 110 mg/dL   GLUCOSE, POC    Collection Time: 10/04/21  5:25 AM   Result Value Ref Range    Glucose (POC) 159 (H) 70 - 110 mg/dL         Assessment/Plan:     Active Problems:    Sepsis ([facility] (9/29/2021)      Sinus pause (9/30/2021)      Severe protein-calorie malnutrition ([facility] (10/1/2021)    ASSESSMENT:    1. Acute on chronic metabolic encephalopathy, stable. 2.  Chronic encephalopathy due to Alzheimer's dementia  3. 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 Failure to thrive and poor appetite   5. Hypertension  6. Dyslipidemia  7. Moderate emphysema  8. Diabetes mellitus  9. Right thyroid nodule  10. Hypothermia    PLAN:    Palliative care team spoke to family member and patient has been transitioned to inpatient comfort care at this point. Transfer patient to medical bed  Continue DuoNebs as needed and comfort meds   to work for disposition plan    Total time to take care of this patient was 25 minutes and more than 50% of time was spent counseling and coordinating care. Disclaimer: Sections of this note are dictated using utilizing voice recognition software, which may have resulted in some phonetic based errors in grammar and contents.  Even though attempts were made to correct all the mistakes, some may have been missed, and remained in the body of the document. If questions arise, please contact our department.

## 2021-10-04 NOTE — PROGRESS NOTES
Bernadette Holman 1266: [phone])  Formerly [facility]: [phone]     Patient Name: Vicenta Figueroa  YOB: 1934    Date of Progress Note: 10/4/2021   Reason for Consult: establish goals of care  Requesting Provider: Maurice Drummond MD   Primary Care Physician: Caryle Locket, MD      SUMMARY:   Vicenta Figueroa is a 80 y.o. female with a past history of dementia, DM2, Hep C, who was admitted on 9/29/2021 from [facility] with a diagnosis of AMS and decreased nutritional intake. Current medical issues leading to Palliative Medicine involvement include: establish goals of care. CHIEF COMPLAINT: hypothermia and sinus bradycardia    HPI/SUBJECTIVE:    Patient is an 80-year-old -American female who came in from [facility] with altered mental status times several days, dehydration and poor appetite. She had recently lost her son about 4 months ago and family reports that she has been declining since then. Patient has some level of Alzheimer's dementia at baseline but had been fairly dependent with her ADLs and somewhat able to communicate, she recently has had several falls at the nursing home. While hospitalized patient developed sinus bradycardia and hypoglycemia. She became hypothermic and mental status continued to decline. 10/4/2021 poorly responsive this am, spoke with her nephew Mr Diana Mckeon who was speaking on behalf of the family, they wish to move to comfort measures. 10/1/21:  Pt continues to have frequent periods of sinus pauses and hypothermia. Remains on the warming blanket and external pacer/fibulation    The patient is:   [] Verbal and participatory  [x] Non-participatory due to:  Minimally responsive, Dementia at baseline     GOALS OF CARE:  Patient/Health Care Proxy Stated Goals: Comfort      TREATMENT PREFERENCES:   Code Status: DNR/ DNI          PALLIATIVE DIAGNOSES:   1. Goals of care/ACP  2. Bradycardia  3.  Altered mental status  4. Debility       PLAN:   10/4/2021 Patient seen along with Ms Darek Feldman, [address]. Patient is poorly responsive, did not alert to her name or light touch. We spoke with her nephew Mr Michaelle Lepe who shared with us all family has had an opportunity to see Ms Ariana Mar. They wish to transition to comfort measures. Family understands this means stopping external pacer, no longer using [employer], stopping all aggressive treatments, including blood draws, x rays, any IV aggressive medications. Comfort order set in place. Goals of care DNR/DNI, comfort measures, no feeding tubes. Will need POST for comfort prior to d/c. Offered support to the family in this most difficult time. BSRN and attending aware of care decisions. ( please see below for previous notes per palliative team)       10/1/21: This NP in to see patient with Sana Nesbitt RN pt is not responsive and appears in mild to moderate distress with facial grimacing. Family coming in today and our team will reach out to support for ongoing discussions about escalation of care and continuation of current treatments which are serving only to prolong her dying process. At this time patient remains a DNR/DNI POST completed and on file   Addendum: In the afternoon this NP and Sana Nesbitt RN in to see patient at the bedside she was accompanied by her niece in [facility] and other family member. Discussed with family my conversation with the cardiologist regarding inability to escalate care for pacemaker placement. Also discussed the burden of external pacing and frequency of patient being shocked by this process. They are understanding that there is the chance that patient may not be able to endure this treatment and it could be prolonging her suffering. Flor Layne at this time was not prepared to move patient to comfort care but understands that if the external pacer becomes more burdensome cardiology may make the decision to turn it off.   She is in agreement with this decision. This was also discussed with Pradip Brunson MD who will be here this weekend in the event that family makes the decision to move the patient to comfort care. 1.   Goals of care/ACP  This NP in to see the patient at the bedside she was accompanied by her [facility] and niece in [facility] who is also reported to be her secondary MPOA. She states that her  is the patient's nephew and the primary MPOA, Enrique Foley. Discussion regarding burdens and benefits of ongoing treatments, CPR and intubation. Mrs Matt Marcos expressed that she would not want to see her aunt go through any suffering if it were not a road to recovery. We then contacted several other family members including her  and I explained the current situation and reiterated the burdens of CPR and intubation. They were all in agreement that when her natural time comes and her heart stops to let her go and do not attempt CPR or place her on a ventilator. A POST was completed for DNR/DNI limited interventions. I did not address feeding tubes at this time. 2.   Bradycardia  Pt developed frequent prolonged pauses and cardiology placed her on an external defibrillation device. 3.   Altered mental status  Minimally responsive which is a change from her baseline dementia status. 4.   Debility  Current PPS is around 20% patient is bed bound, unable to do or interact in any meaningful way, requires total care for all ADL's, minimal to no oral intake. She has a very poor short and long term prognosis. 5.  Initial consult note routed to primary continuity provider  6.   Communicated plan of care with: Palliative IDT      Advance Care Planning:  [] [facility] Interdisciplinary Team has updated the ACP Navigator with [address] and Patient Capacity    Primary Decision Maker ([address]): Marissa Galeana (nephew)    Medical Interventions: Comfort measures   Other Instructions:   Artificially Administered Nutrition: No feeding tube     As far as possible, the palliative care team has discussed with patient / health care proxy about goals of care / treatment preferences for patient. HISTORY:     History obtained from: Chart review and family interview   Active Problems:    Sepsis ([facility] (9/29/2021)      Sinus pause (9/30/2021)      Severe protein-calorie malnutrition ([address] (10/1/2021)      Past Medical History:   Diagnosis Date    Anxiety     Arthritis     Dementia ([facility]     Diabetes ([facility]     Edema of extremities     Falls     Hematologic disorder     [facility]     Hypercholesterolemia     Hypertension     Insomnia     Labyrinthitis     Menopause     Primary osteoarthritis of knees, bilateral     Urge incontinence     Vitamin D deficiency       Past Surgical History:   Procedure Laterality Date    HX HYSTERECTOMY  1980    HX OTHER SURGICAL  2007    Lap Band for weight loss      Family History   Problem Relation Age of Onset    Breast Cancer Sister 80    Ovarian Cancer Sister 33        33s     History reviewed, no pertinent family history.   Social History     Tobacco Use    Smoking status: Never Smoker    Smokeless tobacco: Never Used   Substance Use Topics    Alcohol use: No     Allergies   Allergen Reactions    Contrast Dye [Iodine] Other (comments)     Neurological reaction      Current Facility-Administered Medications   Medication Dose Route Frequency    hydrALAZINE (APRESOLINE) 20 mg/mL injection 10 mg  10 mg IntraVENous Q6H PRN    LORazepam (INTENSOL) 2 mg/mL oral concentrate 0.5 mg  0.5 mg SubLINGual Q4H PRN    hyoscyamine SL (LEVSIN/SL) tablet 0.125 mg  0.125 mg SubLINGual Q4H PRN    scopolamine (TRANSDERM-SCOP) 1 mg over 3 days 1 Patch  1 Patch TransDERmal Q72H PRN    glycopyrrolate (ROBINUL) injection 0.2 mg  0.2 mg IntraVENous Q4H PRN    morphine (ROXANOL) 100 mg/5 mL (20 mg/mL) concentrated solution 5 mg  5 mg SubLINGual Q1H PRN    insulin lispro (HUMALOG) injection   SubCUTAneous Q6H    sodium chloride (NS) flush 5-40 mL  5-40 mL IntraVENous Q8H    sodium chloride (NS) flush 5-40 mL  5-40 mL IntraVENous PRN    acetaminophen (TYLENOL) tablet 650 mg  650 mg Oral Q6H PRN    Or    acetaminophen (TYLENOL) suppository 650 mg  650 mg Rectal Q6H PRN    polyethylene glycol (MIRALAX) packet 17 g  17 g Oral DAILY PRN    albuterol-ipratropium (DUO-NEB) 2.5 MG-0.5 MG/3 ML  3 mL Nebulization Q4H PRN    dextrose 5 % - 0.45% NaCl infusion  25 mL/hr IntraVENous CONTINUOUS    nitroglycerin (NITROBID) 2 % ointment 1 Inch  1 Inch Topical Q6H          Clinical Pain Assessment (nonverbal scale for nonverbal patients): Clinical Pain Assessment  Severity: 0     Activity (Movement): Lying quietly, normal position    Duration: for how long has pt been experiencing pain (e.g., 2 days, 1 month, years)  Frequency: how often pain is an issue (e.g., several times per day, once every few days, constant)     FUNCTIONAL ASSESSMENT:     Palliative Performance Scale (PPS):  PPS: 30    ECOG  ECOG Status : Completely disabled     PSYCHOSOCIAL/SPIRITUAL SCREENING:      Any spiritual / Restorationist concerns:  [] Yes /  [x] No  10/4/2021 unable to assess for patient   Caregiver Burnout:  [] Yes /  [x] No /  [] No Caregiver Present      Anticipatory grief assessment:   [x] Normal  / [] Maladaptive     10/4/2021 unable to assess for patient    REVIEW OF SYSTEMS:     Systems: constitutional, ears/nose/mouth/throat, respiratory, gastrointestinal, genitourinary, musculoskeletal, integumentary, neurologic, psychiatric, endocrine. Positive findings noted below. Modified ESAS Completed by: provider           Pain: 0           Dyspnea: 0               Positive and pertinent negative findings in ROS are noted above in HPI. The following systems were [] reviewed / [x] unable to be reviewed as noted in HPI  Other findings are noted below.      PHYSICAL EXAM:     Constitutional: poorly responsive this am, appears comfortable in NAD   Eyes: closed   Cardiovascular: RRR, not currently paced   Respiratory: respirations shallow  Neurologic: poorly responsive this am     Other: Wt Readings from Last 3 Encounters:   09/30/21 78.5 kg (173 lb)   07/07/21 78.5 kg (173 lb)   06/29/21 85.7 kg (189 lb)     Blood pressure (!) 180/111, pulse 100, temperature (!) 96.3 °F (35.7 °C), resp. rate 21, height 5' 4\" (1.626 m), weight 78.5 kg (173 lb), SpO2 99 %. Pain:  Pain Scale 1: Adult Nonverbal Pain Scale  Pain Intensity 1: 0              Pain Intervention(s) 1: Medication (see MAR)       LAB AND IMAGING FINDINGS:     Lab Results   Component Value Date/Time    WBC 4.5 (L) 10/01/2021 05:40 AM    HGB 10.1 (L) 10/01/2021 05:40 AM    PLATELET 239 34/06/8725 05:40 AM     Lab Results   Component Value Date/Time    Sodium 145 10/01/2021 05:40 AM    Potassium 3.9 10/01/2021 05:40 AM    Chloride 116 (H) 10/01/2021 05:40 AM    CO2 22 10/01/2021 05:40 AM    BUN 21 (H) 10/01/2021 05:40 AM    Creatinine 1.60 (H) 10/01/2021 05:40 AM    Calcium 8.6 10/01/2021 05:40 AM    Magnesium 2.1 10/01/2021 05:40 AM    Phosphorus 3.5 10/01/2021 05:40 AM      Lab Results   Component Value Date/Time    Alk.  phosphatase 64 09/30/2021 02:15 PM    Protein, total 6.7 09/30/2021 02:15 PM    Albumin 2.6 (L) 09/30/2021 02:15 PM    Globulin 4.1 (H) 09/30/2021 02:15 PM     Lab Results   Component Value Date/Time    INR 1.0 09/29/2021 10:45 AM    Prothrombin time 12.7 09/29/2021 10:45 AM      No results found for: IRON, FE, TIBC, IBCT, PSAT, FERR   No results found for: PH, PCO2, PO2  No components found for: [place]   Lab Results   Component Value Date/Time    CK 30 10/01/2021 05:40 AM    CK - MB 1.6 10/01/2021 05:40 AM              Total time: 35 minutes   Counseling / coordination time, spent as noted above:   > 50% counseling / coordination:  Time spent in direct consultation with the patient, medical team, and family     Prolonged service was provided for []30 min   []75 min in face to face time in the presence of the patient, spent as noted above. Time Start:   Time End:     Disclaimer: Sections of this note are dictated using utilizing voice recognition software, which may have resulted in some phonetic based errors in grammar and contents. Even though attempts were made to correct all the mistakes, some may have been missed, and remained in the body of the document. If questions arise, please contact our department.

## 2021-10-04 NOTE — PROGRESS NOTES
9123: Bedside and Verbal shift change report given to writer by Patrice Sawant. Report included the following information ED Summary, Intake/Output, MAR, Accordion, Recent Results, Med Rec Status, Cardiac Rhythm ., Alarm Parameters , Quality Measures and Dual Neuro Assessment. 0900: Palliative Team at pt bedside, discussed patient disposition/Plan of Care with family/POA- Comfort Care Orders initiated. 1140: [address] [place] [address] [phone] at pt bedside with friend Jeannine Trent, [employer] music playing. Pt repositioned for comfort. Per family request and physician order transcutaneous pacemaker, SCD's, Kendell Hugger removed. Pt medicated with Scopolamine and Robinul for increased salivation, see MAR.  1900: Bedside and Verbal shift change report given to Vanessa Hopper by Jim Kraft. Report included the following information ED Summary, Procedure Summary, Intake/Output, MAR, Accordion, Recent Results, Med Rec Status, Cardiac Rhythm ., Alarm Parameters , Quality Measures and Dual Neuro Assessment.

## 2021-10-04 NOTE — PROGRESS NOTES
Nutrition Note    Altered mental status, unable to verbalize. Transitioned to comfort measures only this morning. NPO, bites and sips for comfort as tolerated if awake/alert. BM 10/4. Remains on D5 ½ NS at 25 mL/hr providing 102 kcal per day at this time. Nutritional goals modified to reflect comfort measures. Nutrition Recommendations/Plan:   - Continue to provide nutrition interventions consistent with goals of care: bites and sips for comfort as tolerated.   - IVF per MD.    Electronically signed by Tahir Andres RD on 10/4/2021 at 11:35 AM    Contact: [phone]

## 2021-10-04 NOTE — PROGRESS NOTES
Problem: Falls - Risk of  Goal: *Absence of Falls  Description: Document Dameon Wright Fall Risk and appropriate interventions in the flowsheet. 10/3/2021 2001 by Peter Jaeger RN  Outcome: Progressing Towards Goal  Note: Fall Risk Interventions:       Mentation Interventions: Adequate sleep, hydration, pain control, Door open when patient unattended, Evaluate medications/consider consulting pharmacy, Room close to nurse's station    Medication Interventions: Evaluate medications/consider consulting pharmacy    Elimination Interventions: Call light in reach    History of Falls Interventions: Consult care management for discharge planning, Door open when patient unattended, Evaluate medications/consider consulting pharmacy, Room close to nurse's station      10/3/2021 2001 by Peter Jaeger RN  Outcome: Progressing Towards Goal  Note: Fall Risk Interventions:       Mentation Interventions: Adequate sleep, hydration, pain control, Door open when patient unattended, Evaluate medications/consider consulting pharmacy, Room close to nurse's station    Medication Interventions: Evaluate medications/consider consulting pharmacy    Elimination Interventions: Call light in reach    History of Falls Interventions: Consult care management for discharge planning, Door open when patient unattended, Evaluate medications/consider consulting pharmacy, Room close to nurse's station         Problem: Pressure Injury - Risk of  Goal: *Prevention of pressure injury  Description: Document John Scale and appropriate interventions in the flowsheet. Outcome: Progressing Towards Goal  Note: Pressure Injury Interventions:  Sensory Interventions: Assess changes in LOC         Activity Interventions: Assess need for specialty bed, Pressure redistribution bed/mattress(bed type)    Mobility Interventions: Assess need for specialty bed, Float heels, Pressure redistribution bed/mattress (bed type), Turn and reposition approx.  every two hours(pillow and wedges)    Nutrition Interventions: Document food/fluid/supplement intake    Friction and Shear Interventions: Apply protective barrier, creams and emollients, Minimize layers, Lift team/patient mobility team                Problem: Nutrition Deficit  Goal: *Optimize nutritional status  Outcome: Not Progressing Towards Goal     Problem: Arrhythmia Pathway (Adult)  Goal: *Absence of arrhythmia  Outcome: Not Progressing Towards Goal     Problem: Delirium Treatment  Goal: *Level of consciousness restored to baseline  Outcome: Not Progressing Towards Goal  Goal: *Level of environmental perceptions restored to baseline  Outcome: Not Progressing Towards Goal  Goal: *Sensory perception restored to baseline  Outcome: Not Progressing Towards Goal  Goal: *Emotional stability restored to baseline  Outcome: Not Progressing Towards Goal  Goal: *Functional assessment restored to baseline  Outcome: Not Progressing Towards Goal  Goal: *Absence of falls  Outcome: Progressing Towards Goal  Goal: *Will remain free of delirium, CAM Score negative  Outcome: Not Progressing Towards Goal  Goal: *Cognitive status will be restored to baseline  Outcome: Not Progressing Towards Goal  Goal: Interventions  Outcome: Not Progressing Towards Goal     Problem: Delirium Treatment  Goal: *Level of consciousness restored to baseline  Outcome: Not Progressing Towards Goal  Goal: *Level of environmental perceptions restored to baseline  Outcome: Not Progressing Towards Goal  Goal: *Sensory perception restored to baseline  Outcome: Not Progressing Towards Goal  Goal: *Emotional stability restored to baseline  Outcome: Not Progressing Towards Goal  Goal: *Functional assessment restored to baseline  Outcome: Not Progressing Towards Goal  Goal: *Absence of falls  Outcome: Progressing Towards Goal  Goal: *Will remain free of delirium, CAM Score negative  Outcome: Not Progressing Towards Goal  Goal: *Cognitive status will be restored to baseline  Outcome: Not Progressing Towards Goal  Goal: Interventions  Outcome: Not Progressing Towards Goal

## 2021-10-04 NOTE — PROGRESS NOTES
Physician Progress Note      PATIENT:               Juan David Leon  CSN #:                  [id number]  :                       1934  ADMIT DATE:       2021 10:43 AM  DISCH DATE:  RESPONDING  PROVIDER #:        Wen DESIR MD          QUERY TEXT:    Dear Hospitalist  Patient admitted due to altered mental status . Noted documentation of Sepsis on active problem list on 21 . Please document in progress notes the clinical indicators to support this diagnosis on current admission or document if this is dx has been ruled out. If the diagnosis of Sepsis has been ruled out, please update the problem list appropriately so it does not continue to appear as an active problem in daily progress notes. The medical record reflects the following:  Risk Factors: Acute on chronic metabolic encephalopathy, stable. Chronic encephalopathy due to Alzheimer's dementia  Transient complete heart block s/p external pacing Failure to thrive and poor appetite    Clinical Indicators: IDMD Now with altered mental status, bradycardia with sinus pauses, hypothermiaI agree that hypothermia/altered mental status is concerning for sepsis. However after detailed review of labs, exam; clinical probability of sepsis is low. ? Procalcitonin less than 0.05, no leukocytosis, no hypotension argues against bacterial sepsis . SHREE craig at [facility] ED patient was noted to be hypothermic and there was concern for sepsis of unclear source. Treatment: empiric antibiotics IV vanco and zosyn labs carefully monitored, ID consult  Thank you  Elaina Aguayo RN   Options provided:  -- Sepsis currently being treated/evaluated  -- Sepsis ruled out after study  -- Other - I will add my own diagnosis  -- Disagree - Not applicable / Not valid  -- Disagree - Clinically unable to determine / Unknown  -- Refer to Clinical Documentation Reviewer    PROVIDER RESPONSE TEXT:    Sepsis ruled out after study .     Query created by: Jnuie Garcia on 10/4/2021 9:13 AM      Electronically signed by:  Lenane Orozco MD 10/4/2021 9:39 AM

## 2021-10-04 NOTE — PROGRESS NOTES
Palliative Medicine      Palliative Team members Felicita Lanes, DIXIE and this writer in to see patient at bedside. Pt is not responsive or alerting to verbal stimuli. Warming blanket still in use for temp control as well as external pacer for HR.      Team member placed call to family, Attempted to reach Ms. Percy Tobias, NO answer and voice mail box was full. Call and spoke with Brie Perla, medical update on current medical condition provided. He shared that family members were able to come over the weekend to see Ms. Isha Heart. He added they found comfort in being able to see her. Team member offered compassionate support. Discussed with Mr. Xiao Like next steps and transition to comfort measures. This writer explained comfort measures discussed at length including the removal of the external pacer and warming [facility] blanket, no further labs, x rays, IVAB, IVF\"s , or dialysis. Discussed comfort medications; Roxanol and ativan to help alleviate pain and dyspnea. Mr. Xiao Like shared that the family agrees to keeping Ms. Elaine Baldwin comfortable. Informed Mr. Xiao Like that an order for hospice would be placed. And once available patient would be transferred to the main hospital. He asked questions around discharge plan, informed him that CM and hospice will work with family on plan. He shared that they have applied for Medicaid for patient who was to continue LTC at the nursing home. Comfort orders and hospice order. BSRN and Attending MD update.  Plan to get      At this time patient remains a DNR/DNI POST completed and on file   Attending and BSRN updated.      Melodie MAJORN, RN, [facility]  Palliative Medicine Inpatient RN  Palliative COPE Line: [phone]

## 2021-10-04 NOTE — PROGRESS NOTES
Cardiology Progress Note    Admit Date: 9/29/2021  Attending Cardiologist: Dr. Mary Ann Oconnell:     -Presented due to altered mental status.  Per chart review, pt recently diagnosed with Alzheimer's with progressive decline over past 4 months.  Hypothermic with pinpoint pupils on presentation to [facility] ER on 9/29/2021.  -Sinus pauses, transient complete heart block. , requiring external pacing. Electrolytes normal.   -Echo 7/4/2021: EF 50-55% with global hypokinesis with preserved septal function, normal LV cavity size, mild concentric LVH, normal RV size with low-normal function, no significant valvular disease  -HTN, on Metoprolol and Losartan as outpatient. -DM  -Failure to thrive.    -Hypercholesterolemia  -Moderate emphysema at lung bases noted as incidental finding on CT abd/pelvis 6/29/2021.  -DNR/DNI      Primary cardiologist is Dr. Erasto Hensley:     Seen and evaluated. Agree with below. It appears that decision is leaning towards comfort measures. When the decision is made for no further aggressive measures, I would discontinue her percutaneous pacer. No new cardiac recommendations at this time.    -Continued transcutaneous pacing as needed for HR < 40. BP remains hypertensive, PRN HTN medications per primary team.  Pt unable to take PO medications currently.  -Continue discussions regarding goals of care per primary/palliative teams, appreciate assistance.  -No further cardiac intervention planned at this time.  -Will check labs including CBC, BMP, Mg.     Subjective:     Non-verbal.  Chart reviewed, overnight events noted. Discussed with RN, no pacing required since yesterday afternoon.     Objective:      Patient Vitals for the past 8 hrs:   Temp Pulse Resp BP SpO2   10/04/21 1000 (!) 96.1 °F (35.6 °C) 90 18 (!) 177/100 100 %   10/04/21 0900 (!) 95.7 °F (35.4 °C) 86 21 (!) 169/93 100 %   10/04/21 0800 (!) 95.7 °F (35.4 °C) 86 22 (!) 168/100 100 %   10/04/21 0700 (!) 95.9 °F (35.5 °C) 84 19 (!) 186/99 100 %   10/04/21 0613 (!) 96.1 °F (35.6 °C)  18 (!) 185/93 99 %   10/04/21 0600 (!) 96.3 °F (35.7 °C) 78 20 (!) 201/98 100 %   10/04/21 0500 (!) 96.1 °F (35.6 °C) 85 22 (!) 193/102 99 %   10/04/21 0400 (!) 95.7 °F (35.4 °C) 81 21 (!) 170/111 99 %   10/04/21 0300 (!) 95.9 °F (35.5 °C) 85 21 (!) 173/101 99 %         Patient Vitals for the past 96 hrs:   Weight   09/30/21 1212 78.5 kg (173 lb)                Current Facility-Administered Medications   Medication Dose Route Frequency Last Admin    famotidine (PF) (PEPCID) 20 mg in 0.9% sodium chloride 10 mL injection  20 mg IntraVENous DAILY      hydrALAZINE (APRESOLINE) 20 mg/mL injection 10 mg  10 mg IntraVENous Q6H PRN      insulin lispro (HUMALOG) injection   SubCUTAneous Q6H 2 Units at 10/04/21 0604    sodium chloride (NS) flush 5-40 mL  5-40 mL IntraVENous Q8H 10 mL at 10/04/21 0604    sodium chloride (NS) flush 5-40 mL  5-40 mL IntraVENous PRN 5 mL at 10/04/21 0616    acetaminophen (TYLENOL) tablet 650 mg  650 mg Oral Q6H PRN      Or    acetaminophen (TYLENOL) suppository 650 mg  650 mg Rectal Q6H  mg at 10/01/21 0412    polyethylene glycol (MIRALAX) packet 17 g  17 g Oral DAILY PRN      aspirin (ASA) suppository 300 mg  300 mg Rectal DAILY 300 mg at 10/04/21 0808    albuterol-ipratropium (DUO-NEB) 2.5 MG-0.5 MG/3 ML  3 mL Nebulization Q4H PRN      dextrose 5 % - 0.45% NaCl infusion  100 mL/hr IntraVENous CONTINUOUS 100 mL/hr at 10/04/21 0232    nitroglycerin (NITROBID) 2 % ointment 1 Inch  1 Inch Topical Q6H 1 Inch at 10/04/21 0603    glucose chewable tablet 16 g  16 g Oral PRN      glucagon (GLUCAGEN) injection 1 mg  1 mg IntraMUSCular PRN      dextrose (D50W) injection syrg 25 g  50 mL IntraVENous PRN      thiamine (B-1) 100 mg in 0.9% sodium chloride 50 mL IVPB  100 mg IntraVENous  mg at 10/03/21 1901         Intake/Output Summary (Last 24 hours) at 10/4/2021 1024  Last data filed at 10/4/2021 0800  Gross per 24 hour Intake 1980 ml   Output 788 ml   Net 1192 ml       Physical Exam:  General:  Elderly female, non-verbal, no apparent distress, Kendell hugger in place  Neck:  supple  Lungs:  decreased  Heart:  regular rate and rhythm  Abdomen:  abdomen is soft without significant tenderness, masses, organomegaly or guarding  Extremities:  Atraumatic, no edema    Visit Vitals  BP (!) 177/100   Pulse 90   Temp (!) 96.1 °F (35.6 °C)   Resp 18   Ht 5' 4\" (1.626 m)   Wt 78.5 kg (173 lb)   SpO2 100%   BMI 29.70 kg/m²       Data Review:     Labs: Results:       Lipid Panel Lab Results   Component Value Date/Time    Cholesterol, total 188 09/21/2021 09:10 AM    HDL Cholesterol 49 09/21/2021 09:10 AM    LDL, calculated 113 (H) 09/21/2021 09:10 AM    VLDL, calculated 26 09/21/2021 09:10 AM    Triglyceride 130 09/21/2021 09:10 AM    CHOL/HDL Ratio 3.8 09/21/2021 09:10 AM      Thyroid Studies Lab Results   Component Value Date/Time    TSH 2.66 09/30/2021 02:15 AM          Signed By: More Chaudhari PA-C     October 4, 2021

## 2021-10-05 NOTE — PROGRESS NOTES
Hospitalist Progress Note    Subjective:   Daily Progress Note: 10/5/2021     Patient is lying in bed in NAD. Mouth breathing present. Opens eyes on verbal commands. Poor p.o. intake per nursing. Current Facility-Administered Medications   Medication Dose Route Frequency    hydrALAZINE (APRESOLINE) 20 mg/mL injection 10 mg  10 mg IntraVENous Q6H PRN    LORazepam (INTENSOL) 2 mg/mL oral concentrate 0.5 mg  0.5 mg SubLINGual Q4H PRN    hyoscyamine SL (LEVSIN/SL) tablet 0.125 mg  0.125 mg SubLINGual Q4H PRN    scopolamine (TRANSDERM-SCOP) 1 mg over 3 days 1 Patch  1 Patch TransDERmal Q72H PRN    glycopyrrolate (ROBINUL) injection 0.2 mg  0.2 mg IntraVENous Q4H PRN    morphine (ROXANOL) 100 mg/5 mL (20 mg/mL) concentrated solution 5 mg  5 mg SubLINGual Q1H PRN    nitroglycerin (NITROBID) 2 % ointment 1 Inch  1 Inch Topical Q6H    sodium chloride (NS) flush 5-40 mL  5-40 mL IntraVENous Q8H    sodium chloride (NS) flush 5-40 mL  5-40 mL IntraVENous PRN    acetaminophen (TYLENOL) tablet 650 mg  650 mg Oral Q6H PRN    Or    acetaminophen (TYLENOL) suppository 650 mg  650 mg Rectal Q6H PRN    polyethylene glycol (MIRALAX) packet 17 g  17 g Oral DAILY PRN    albuterol-ipratropium (DUO-NEB) 2.5 MG-0.5 MG/3 ML  3 mL Nebulization Q4H PRN    dextrose 5 % - 0.45% NaCl infusion  25 mL/hr IntraVENous CONTINUOUS        Review of Systems  Review of systems not obtained due to patient factors.     Objective:     Visit Vitals  BP (!) 226/125   Pulse 80   Temp (!) 96.1 °F (35.6 °C)   Resp 30   Ht 5' 4\" (1.626 m)   Wt 78.5 kg (173 lb)   SpO2 99%   BMI 29.70 kg/m²      O2 Device: None (Room air)    Temp (24hrs), Av.2 °F (35.7 °C), Min:96.1 °F (35.6 °C), Max:96.4 °F (35.8 °C)      10/05 0701 - 10/05 1900  In: 75 [I.V.:75]  Out: -   10/03 1901 - 10/05 0700  In:  [I.V.:]  Out: 795 [Urine:795]    General appearance -nonverbal, eyes open on verbal/tactile commands, not in acute distress  Chest -diminished air entry noted in bases, no wheezes. Heart - S1 and S2 normal.  Abdomen - soft, nontender, nondistended, Bowel sounds present  Neurological -opens eyes on verbal/tactile commands  Extremities - no pitting pedal edema noted    Data Review    No results found for this or any previous visit (from the past 24 hour(s)). Assessment/Plan:     Active Problems:    Sepsis ([facility] [address] (9/29/2021)      Sinus pause (9/30/2021)      Severe protein-calorie malnutrition ([facility] [address] (10/1/2021)    ASSESSMENT:    1. Acute on chronic metabolic encephalopathy, stable. 2.  Chronic encephalopathy due to Alzheimer's dementia  3. Intermittent complete heart block. 4.  Failure to thrive and poor appetite   5. Hypertension  6. Dyslipidemia  7. Moderate emphysema  8. Diabetes mellitus  9. Right thyroid nodule  10. Hypothermia  11. Poor functional and nutritional status    PLAN:    Continue comfort measures  Discharge patient home with hospice once arrangements are made  Discussed with case management about it. Total time to take care of this patient was 25 minutes and more than 50% of time was spent counseling and coordinating care. Disclaimer: Sections of this note are dictated using utilizing voice recognition software, which may have resulted in some phonetic based errors in grammar and contents. Even though attempts were made to correct all the mistakes, some may have been missed, and remained in the body of the document. If questions arise, please contact our department.

## 2021-10-05 NOTE — DISCHARGE INSTRUCTIONS
Learning About Hospice and Palliative Care  What are hospice and palliative care? Palliative (say \"PAL-isabela-uh-tiv\") care is an area of medicine that helps give you more good days by providing care for quality-of-life issues. It includes treating symptoms like pain, nausea, or sleep problems. It can also include helping you and your loved ones to:  · Understand your illness better. · Talk more openly about your feelings. · Decide what treatments you want or don't want. · Communicate better with your doctors, nurses, and each other. Hospice care is a type of palliative care. But it's for people who are near the end of life. What kinds of care are involved? Palliative care: This treatment helps you feel better physically, emotionally, and spiritually while doctors also treat your illness. Your care may include pain relief, counseling, or nutrition advice. Hospice care: Again, the goal of this type of care is to help you feel better. And it can help you get the most out of the time you have left. But you no longer get treatment to try to cure your illness. When does care happen? Palliative care: This care can happen at any time during a serious illness. You don't have to be near death to get this care. Hospice care: In most cases, you can choose hospice care when your doctor believes that you have no more than about 6 months to live. Where does the care happen? Palliative care: This care often happens in hospitals or long-term care facilities like nursing homes. It can take place wherever you are treated, even in your home. Hospice care: Most hospice care is done in the place the patient calls \"home. \" This is often the person's home. But it could also be a place like a nursing home or snf center. Hospice care may also be given in hospice centers, hospitals, and other places. Who provides the care? Palliative care: There are doctors and nurses who specialize in this field.  But your own doctor may also give some of this care. And there are many other experts who may help you. These include social workers, counselors, therapists, and nutrition experts. Hospice care: In hospitals, hospice centers, and other facilities, care is given by doctors, nurses, and others who are trained in hospice care. In the home, a family member is often the main caregiver. But the family member gets help from care experts. They are on call 24 hours a day. Where can you learn more? Go to http://www.gray.com/  Enter D515 in the search box to learn more about \"Learning About Hospice and Palliative Care. \"  Current as of: March 17, 2021               Content Version: 13.0  © 2006-2021 [employer]. Care instructions adapted under license by [employer] (which disclaims liability or warranty for this information). If you have questions about a medical condition or this instruction, always ask your healthcare professional. [address] any warranty or liability for your use of this information.

## 2021-10-05 NOTE — PROGRESS NOTES
0700- Report received from Anabelle Castellanos [place]. Will assume care of the patient.

## 2021-10-05 NOTE — PALLIATIVE CARE
[address] [facility] [phone]  DR. SAENZ [facility] [address], NP and this LMSW attended to pt at bedside. Pt is on comfort measures at this time. Upon entry to room, pt was found laying in bed, eyes open, moving head from side to side periodically, and moving arm and shoulder adjusting body position, very slightly. Pt able to track with her eyes. Appropriately responds to verbal and tactile stimuli. Pt reports no pain, with had shake indicating no. Pt reports no concerns with had shake, indicating no. Pt was informed she is safe and will be well cared for. Pt's family has indicated a desire to take pt home with hospice services. Thank you for this referral to Palliative Care. The Palliative care team remains available to support pt and her family. Goals of care have been addressed.      Jeannie Sullivan, [facility] [facility]  Palliative Medicine Inpatient   DR. SAENZ [facility]  Palliative COPE Line: [phone] ([phone])

## 2021-10-05 NOTE — PROGRESS NOTES
Call made to [facility] transportation [phone], spoke with Uriel Pool, dispatch will call nurses station with [facility].   Trip # [id number]

## 2021-10-05 NOTE — PROGRESS NOTES
Patient has had uneventful day. Pt intermittently opens eyes and follows commands. Transport was scheduled to arrive at 1630. As of 36 transport had not arrived. Called dispatcher and was told team should arrive within 1 hour. At [address] [facility] transport team has still not arrived. Dispatcher again called and states he will try and arrange another company to complete the transport. Edita Flax updated about current status and wishes to be notified with any updates. All discharge paperwork has been completed, PIV's removed and medication from pharmacy at nurses station. Report given to Elvira Bautista [facility]. On departure Hospice RN and Edita Mims wish to be contacted. Hospice contact number is [phone]. Edita Mims contact info is in demographics information.

## 2021-10-05 NOTE — PROGRESS NOTES
Bernadette Holman 1266: [phone]  [facility]: [phone]     Patient Name: Sergio Marie  YOB: 1934    Date of Progress Note: 10/5/2021   Reason for Consult: establish goals of care  Requesting Provider: Brandyn Guzman MD   Primary Care Physician: Dario Manriquez MD      SUMMARY:   Sergio Marie is a 80 y.o. female with a past history of dementia, DM2, Hep C, who was admitted on 9/29/2021 from [facility] with a diagnosis of AMS and decreased nutritional intake. Current medical issues leading to Palliative Medicine involvement include: establish goals of care. CHIEF COMPLAINT: hypothermia and sinus bradycardia    HPI/SUBJECTIVE:    Patient is an 49-year-old -American female who came in from [facility] with altered mental status times several days, dehydration and poor appetite. She had recently lost her son about 4 months ago and family reports that she has been declining since then. Patient has some level of Alzheimer's dementia at baseline but had been fairly dependent with her ADLs and somewhat able to communicate, she recently has had several falls at the nursing home. While hospitalized patient developed sinus bradycardia and hypoglycemia. She became hypothermic and mental status continued to decline. 10/5/21:  Patient slightly more responsive this morning. Nodded no when asked if she is in pain, nodded yes when asked if she knew where she was. 10/4/2021 poorly responsive this am, spoke with her nephew Mr Josefina Almeida who was speaking on behalf of the family, they wish to move to comfort measures. 10/1/21:  Pt continues to have frequent periods of sinus pauses and hypothermia.  Remains on the warming blanket and external pacer/fibulation    The patient is:   [] Verbal and participatory  [x] Non-participatory due to:  Minimally responsive, Dementia at baseline     GOALS OF CARE:  Patient/Health Care Proxy Stated Goals: Comfort      TREATMENT PREFERENCES:   Code Status: DNR/ DNI          PALLIATIVE DIAGNOSES:   1. Goals of care/ACP  2. Bradycardia  3. Altered mental status  4. Debility       PLAN:   10/5/2021: Patient seen by this NP and RAFI Moreira Mc. While still poorly responsive she did alert to her name and light touch. Able to track with her eyes and nodded yes and no unsure if appropriately. No family at the bedside this morning. She does not appear at this time to be in distress. When asked if she was in pain she nodded no. Noted Kussmaul breathing pattern. Patient also exhibits blowing bubbles. Continued poor p.o. intake, not on oxygen at this time. Family continue to want to take patient home with hospice if she is stable for transport  Patient is a DO NOT RESUSCITATE DO NOT INTUBATE/comfort care only    10/4/2021 Patient seen along with Ms Braulio Echavarria, [facility]. Patient is poorly responsive, did not alert to her name or light touch. We spoke with her nephew Mr Morales Bradford who shared with us all family has had an opportunity to see Ms Aline Gaviria. They wish to transition to comfort measures. Family understands this means stopping external pacer, no longer using [employer], stopping all aggressive treatments, including blood draws, x rays, any IV aggressive medications. Comfort order set in place. Goals of care DNR/DNI, comfort measures, no feeding tubes. Will need POST for comfort prior to d/c. Offered support to the family in this most difficult time. BSRN and attending aware of care decisions. ( please see below for previous notes per palliative team)       10/1/21: This NP in to see patient with Jacklyn Jimenez RN pt is not responsive and appears in mild to moderate distress with facial grimacing. Family coming in today and our team will reach out to support for ongoing discussions about escalation of care and continuation of current treatments which are serving only to prolong her dying process.    At this time patient remains a DNR/DNI POST completed and on file   Addendum: In the afternoon this NP and Elma Montoya RN in to see patient at the bedside she was accompanied by her niece in law Cheng Proper and other family member. Discussed with family my conversation with the cardiologist regarding inability to escalate care for pacemaker placement. Also discussed the burden of external pacing and frequency of patient being shocked by this process. They are understanding that there is the chance that patient may not be able to endure this treatment and it could be prolonging her suffering. Matt Concepcion at this time was not prepared to move patient to comfort care but understands that if the external pacer becomes more burdensome cardiology may make the decision to turn it off. She is in agreement with this decision. This was also discussed with Low Cardenas MD who will be here this weekend in the event that family makes the decision to move the patient to comfort care. 1.   Goals of care/ACP  This NP in to see the patient at the bedside she was accompanied by her [facility] and niece in law Cheng Proper who is also reported to be her secondary MPOA. She states that her  is the patient's nephew and the primary MPOA, Evaguillermomarci Foley. Discussion regarding burdens and benefits of ongoing treatments, CPR and intubation. Mrs Le Rodrigez expressed that she would not want to see her aunt go through any suffering if it were not a road to recovery. We then contacted several other family members including her  and I explained the current situation and reiterated the burdens of CPR and intubation. They were all in agreement that when her natural time comes and her heart stops to let her go and do not attempt CPR or place her on a ventilator. A POST was completed for DNR/DNI limited interventions. I did not address feeding tubes at this time.    2.   Bradycardia  Pt developed frequent prolonged pauses and cardiology placed her on an external defibrillation device. 3.   Altered mental status  Minimally responsive which is a change from her baseline dementia status. 4.   Debility  Current PPS is around 20% patient is bed bound, unable to do or interact in any meaningful way, requires total care for all ADL's, minimal to no oral intake. She has a very poor short and long term prognosis. 5.  Initial consult note routed to primary continuity provider  6. Communicated plan of care with: Palliative IDT      Advance Care Planning:  [] The [facility] Interdisciplinary Team has updated the ACP Navigator with [address] and Patient Capacity    Primary Decision Maker ([address]): Keagan Marx (nephew)    Medical Interventions: Comfort measures   Other Instructions:   Artificially Administered Nutrition: No feeding tube     As far as possible, the palliative care team has discussed with patient / health care proxy about goals of care / treatment preferences for patient. HISTORY:     History obtained from: Chart review and family interview   Active Problems:    Sepsis ([address] (9/29/2021)      Sinus pause (9/30/2021)      Severe protein-calorie malnutrition ([address] (10/1/2021)      Past Medical History:   Diagnosis Date    Anxiety     Arthritis     Dementia ([address]     Diabetes ([address]     Edema of extremities     Falls     Hematologic disorder     Hepatitis C carrier ([address]     Hypercholesterolemia     Hypertension     Insomnia     Labyrinthitis     Menopause     Primary osteoarthritis of knees, bilateral     Urge incontinence     Vitamin D deficiency       Past Surgical History:   Procedure Laterality Date    HX HYSTERECTOMY  1980    HX OTHER SURGICAL  2007    Lap Band for weight loss      Family History   Problem Relation Age of Onset    Breast Cancer Sister 80    Ovarian Cancer Sister 33        33s     History reviewed, no pertinent family history.   Social History     Tobacco Use    Smoking status: Never Smoker  Smokeless tobacco: Never Used   Substance Use Topics    Alcohol use: No     Allergies   Allergen Reactions    Contrast Dye [Iodine] Other (comments)     Neurological reaction      Current Facility-Administered Medications   Medication Dose Route Frequency    hydrALAZINE (APRESOLINE) 20 mg/mL injection 10 mg  10 mg IntraVENous Q6H PRN    LORazepam (INTENSOL) 2 mg/mL oral concentrate 0.5 mg  0.5 mg SubLINGual Q4H PRN    hyoscyamine SL (LEVSIN/SL) tablet 0.125 mg  0.125 mg SubLINGual Q4H PRN    scopolamine (TRANSDERM-SCOP) 1 mg over 3 days 1 Patch  1 Patch TransDERmal Q72H PRN    glycopyrrolate (ROBINUL) injection 0.2 mg  0.2 mg IntraVENous Q4H PRN    morphine (ROXANOL) 100 mg/5 mL (20 mg/mL) concentrated solution 5 mg  5 mg SubLINGual Q1H PRN    nitroglycerin (NITROBID) 2 % ointment 1 Inch  1 Inch Topical Q6H    sodium chloride (NS) flush 5-40 mL  5-40 mL IntraVENous Q8H    sodium chloride (NS) flush 5-40 mL  5-40 mL IntraVENous PRN    acetaminophen (TYLENOL) tablet 650 mg  650 mg Oral Q6H PRN    Or    acetaminophen (TYLENOL) suppository 650 mg  650 mg Rectal Q6H PRN    polyethylene glycol (MIRALAX) packet 17 g  17 g Oral DAILY PRN    albuterol-ipratropium (DUO-NEB) 2.5 MG-0.5 MG/3 ML  3 mL Nebulization Q4H PRN    dextrose 5 % - 0.45% NaCl infusion  25 mL/hr IntraVENous CONTINUOUS          Clinical Pain Assessment (nonverbal scale for nonverbal patients): Clinical Pain Assessment  Severity: 0     Activity (Movement): Lying quietly, normal position    Duration: for how long has pt been experiencing pain (e.g., 2 days, 1 month, years)  Frequency: how often pain is an issue (e.g., several times per day, once every few days, constant)     FUNCTIONAL ASSESSMENT:     Palliative Performance Scale (PPS):  PPS: 30    ECOG  ECOG Status : Completely disabled     PSYCHOSOCIAL/SPIRITUAL SCREENING:      Any spiritual / Voodoo concerns:  [] Yes /  [x] No  10/4/2021 unable to assess for patient   Caregiver Burnout:  [] Yes /  [x] No /  [] No Caregiver Present      Anticipatory grief assessment:   [x] Normal  / [] Maladaptive     10/4/2021 unable to assess for patient    REVIEW OF SYSTEMS:     Systems: constitutional, ears/nose/mouth/throat, respiratory, gastrointestinal, genitourinary, musculoskeletal, integumentary, neurologic, psychiatric, endocrine. Positive findings noted below. Modified ESAS Completed by: provider           Pain: 0           Dyspnea: 0               Positive and pertinent negative findings in ROS are noted above in HPI. The following systems were [] reviewed / [x] unable to be reviewed as noted in HPI  Other findings are noted below. PHYSICAL EXAM:     Constitutional: poorly responsive this am, appears comfortable in NAD   Eyes: closed   Cardiovascular: RRR, not currently paced   Respiratory: respirations shallow  Neurologic: poorly responsive this am     Other: Wt Readings from Last 3 Encounters:   09/30/21 78.5 kg (173 lb)   07/07/21 78.5 kg (173 lb)   06/29/21 85.7 kg (189 lb)     Blood pressure (!) 226/125, pulse 80, temperature (!) 96.1 °F (35.6 °C), resp. rate 30, height 5' 4\" (1.626 m), weight 78.5 kg (173 lb), SpO2 99 %. Pain:  Pain Scale 1: Adult Nonverbal Pain Scale  Pain Intensity 1: 0              Pain Intervention(s) 1: Medication (see MAR)       LAB AND IMAGING FINDINGS:     Lab Results   Component Value Date/Time    WBC 4.5 (L) 10/01/2021 05:40 AM    HGB 10.1 (L) 10/01/2021 05:40 AM    PLATELET 793 04/75/3089 05:40 AM     Lab Results   Component Value Date/Time    Sodium 145 10/01/2021 05:40 AM    Potassium 3.9 10/01/2021 05:40 AM    Chloride 116 (H) 10/01/2021 05:40 AM    CO2 22 10/01/2021 05:40 AM    BUN 21 (H) 10/01/2021 05:40 AM    Creatinine 1.60 (H) 10/01/2021 05:40 AM    Calcium 8.6 10/01/2021 05:40 AM    Magnesium 2.1 10/01/2021 05:40 AM    Phosphorus 3.5 10/01/2021 05:40 AM      Lab Results   Component Value Date/Time    Alk.  phosphatase 64 09/30/2021 02:15 PM Protein, total 6.7 09/30/2021 02:15 PM    Albumin 2.6 (L) 09/30/2021 02:15 PM    Globulin 4.1 (H) 09/30/2021 02:15 PM     Lab Results   Component Value Date/Time    INR 1.0 09/29/2021 10:45 AM    Prothrombin time 12.7 09/29/2021 10:45 AM      No results found for: IRON, FE, TIBC, IBCT, PSAT, FERR   No results found for: PH, PCO2, PO2  No components found for: [place]   Lab Results   Component Value Date/Time    CK 30 10/01/2021 05:40 AM    CK - MB 1.6 10/01/2021 05:40 AM              Total time: 15 minutes   Counseling / coordination time, spent as noted above:   > 50% counseling / coordination:  Time spent in direct consultation with the patient, medical team, and family     Prolonged service was provided for  []30 min   []75 min in face to face time in the presence of the patient, spent as noted above. Time Start:   Time End:     Disclaimer: Sections of this note are dictated using utilizing voice recognition software, which may have resulted in some phonetic based errors in grammar and contents. Even though attempts were made to correct all the mistakes, some may have been missed, and remained in the body of the document. If questions arise, please contact our department.

## 2021-10-05 NOTE — HOSPICE
Pt/family pursuing hospice:yes   Admission dx approved by Hospice Medical Director: Alzheimers dementia. Hospice related comorbid conditions- acute kidney injury, debility. Anticipated hospital d/c date: Today (10/5/21). Discussion occurred with patient and/or family about preference of hospitalization once admitted to hospice: yes   Reviewed and discussed hospice philosophy and keeping the patient comfortable in their residence: yes (Document additional information below)  Discussion with patient/family regarding around the clock caregiver in place due to patient safety in the home once discharged: yes   Identified caregiver: (Document specifics discussed and/or any caregiver concerns identified).      Narrative of events and/or assessment if applicable: Transportation confirmed for 430pm  DME: [facility] bed w/full rails and gel overlay, table, 5L oxygen concentrator, and nebulizer  conf# [id number]    Marylou Hamilton, [address] [place] [address], [address] [place] [address], [address] [place] [address] Str.  [phone]  Email: [email or web address]

## 2021-10-05 NOTE — HOSPICE
Spoke with Sheryle Pepper, pt's niece and POA   Discussed [facility] philosophy, services, criteria, and IDT. Discussed caregiver need for round the clock care with Sheryle Pepper  primary caregiver identified as Sheryle Pepper and Shahid Collazo  Caregiver concerns identified as n/a     Answered all questions. Sheryle Pepper advised they were ready for DME to be delivered today but she was unsure when she would be coming home. Advised usually once hospice in accepted discharge happens quickly and she could come home this evening or tomorrow morning. Emailed brochure with 24/7 contact information. Thank you for the referral to [facility]. If we can be of further assistance please contact [phone].       Marylou Hamilton RN  [address], [address] [place] [address] Str.  [phone]  Email: [email or web address]

## 2021-10-05 NOTE — PROGRESS NOTES
Requested Case Management specialist to assist with transport to [address] [place], [place] [address]Th [facility]  Patient will require BLS transport. Pt requires Stretcher If stretcher, reason: Acute on chronic metabolic encephalopathy, alzheimer's dementia, transient complete heart block, comfort care  Patient is currently requiring oxygen No No  Height:5'4  Weight: 173 Ib  Pt is on isolation:   Is the pt ready now? no  Requested time: 4pm  PCS Faxed: N/A  Insurance verified on face sheet: yes  Auth needed for transport: yes  CM completed PCS/ Envelope and placed on chart. Per family there are 4 steps to get in the house.

## 2021-10-05 NOTE — PROGRESS NOTES
Problem: Falls - Risk of  Goal: *Absence of Falls  Description: Document Tish Blount Fall Risk and appropriate interventions in the flowsheet. Outcome: Progressing Towards Goal  Note: Fall Risk Interventions:       Mentation Interventions: Evaluate medications/consider consulting pharmacy, Reorient patient, Adequate sleep, hydration, pain control    Medication Interventions: Evaluate medications/consider consulting pharmacy, Teach patient to arise slowly, Patient to call before getting OOB, Assess postural VS orthostatic hypotension    Elimination Interventions: Patient to call for help with toileting needs, Toileting schedule/hourly rounds, Toilet paper/wipes in reach, Stay With Me (per policy)    History of Falls Interventions: Door open when patient unattended, Evaluate medications/consider consulting pharmacy, Room close to nurse's station         Problem: Pressure Injury - Risk of  Goal: *Prevention of pressure injury  Description: Document John Scale and appropriate interventions in the flowsheet. Outcome: Progressing Towards Goal  Note: Pressure Injury Interventions:  Sensory Interventions: Assess changes in LOC, Avoid rigorous massage over bony prominences, Turn and reposition approx. every two hours (pillows and wedges if needed)    Moisture Interventions: Absorbent underpads, Minimize layers, Moisture barrier    Activity Interventions: Assess need for specialty bed, Pressure redistribution bed/mattress(bed type), PT/OT evaluation    Mobility Interventions: Assess need for specialty bed, Pressure redistribution bed/mattress (bed type), Turn and reposition approx.  every two hours(pillow and wedges)    Nutrition Interventions: Document food/fluid/supplement intake, Discuss nutritional consult with provider    Friction and Shear Interventions: Apply protective barrier, creams and emollients, Foam dressings/transparent film/skin sealants, Transferring/repositioning devices, Lift team/patient mobility team Problem: Patient Education: Go to Patient Education Activity  Goal: Patient/Family Education  Outcome: Progressing Towards Goal

## 2021-10-05 NOTE — PROGRESS NOTES
Spoke with Terrell Uribe of [facility]. She stated equipment will be delivered to pt's home at 2:15pm today. Called pt's niece Tonie Late. She is agreeable to pt returning home today after hospital bed delivery. She confirmed address pt will be returning to. Sent text message to Dr. Sky Ragsdale.         MORIAH MaxwellN RN  Care Management  Pager: [phone]

## 2021-10-05 NOTE — DISCHARGE SUMMARY
Physician Discharge Summary       Patient: Ron Lopez MRN: [id number]  SSN: [id number]    YOB: 1934  Age: 80 y.o. Sex: female    PCP: Josh Palacio MD    Allergies: Contrast dye [iodine]    Admit date: 9/29/2021  Admitting Provider: Louis Null MD    Discharge date: 10/6/2021  Discharging Provider: Sandra Cordova MD    * Admission Diagnoses: Sepsis [facility]) [A41.9]  Sinus pause [I45.5]    * Discharge Diagnoses:      1. Acute on chronic metabolic encephalopathy, stable. 2.  Chronic encephalopathy due to Alzheimer's dementia  3. Intermittent complete heart block. 4.  Failure to thrive and poor appetite   5. Hypertension  6. Dyslipidemia  7. Moderate emphysema  8. Diabetes mellitus  9. Right thyroid nodule  10. Hypothermia  11. Poor functional and nutritional status      * Hospital Course: Patient was admitted to hospital on September 30, 2021 with complaint of change in mental status. Please refer hospital admission H&P for further details. Patient had CT head done that was negative for acute hemorrhage. She was initially treated with antibiotic for possible sepsis. She was also found out to have sinus pauses with bradycardia and diagnosed with intermittent complete heart block by cardiology started on external pacemaker. Patient also had persistent hypoglycemia earlier and was treated with IV fluid. Her appetite remains poor. She also had hypothermia which was treated with [employer]. Palliative care evaluated this patient and after talking to family members especially medical power of , it was decided to keep her as comfortable as possible and not to pursue aggressive care in form of invasive interventions. Patient was started on comfort measures on October 4, 2021 and external pacer was removed. Patient remained comfortable and will be discharged home on hospice care today once all arrangements are made.   Her diagnosis will be dementia, failure to thrive, oropharyngeal dysphagia and intermittent complete heart block. * Procedures: None  * No surgery found *      Consults: Cardiology and Palliative Care    Significant Diagnostic Studies: CT head, chest x-ray, KUB and CT chest abdomen pelvis    Discharge Exam:  Please refer to my progress note from October 6, 2021 for further detail    * Discharge Condition: poor  * Disposition: Home with hospice    Discharge Medications:  Current Discharge Medication List      START taking these medications    Details   hyoscyamine SL (LEVSIN/SL) 0.125 mg SL tablet 1 Tablet by SubLINGual route every four (4) hours as needed for PRN Reason (Other) (Secretions). Qty: 30 Tablet, Refills: 0  Start date: 10/5/2021      LORazepam (INTENSOL) 2 mg/mL concentrated solution 0.25 mL by SubLINGual route every four (4) hours as needed for Agitation, Restlessness or Shortness of Breath. Max Daily Amount: 3 mg. Qty: 30 mL, Refills: 0  Start date: 10/5/2021    Associated Diagnoses: Comfort measures only status      morphine (ROXANOL) 100 mg/5 mL (20 mg/mL) concentrated solution 0.25 mL by SubLINGual route every one (1) hour as needed for Pain or Shortness of Breath for up to 14 days. Max Daily Amount: 120 mg.  Qty: 30 mL, Refills: 0  Start date: 10/5/2021, End date: 10/19/2021    Associated Diagnoses: Comfort measures only status         CONTINUE these medications which have NOT CHANGED    Details   polyethylene glycol (Miralax) 17 gram/dose powder Take 17 g by mouth daily.  1 tablespoon with 8 oz of water daily  Qty: 117 g, Refills: 0         STOP taking these medications       ergocalciferol (Vitamin D2) 1,250 mcg (50,000 unit) capsule Comments:   Reason for Stopping:         losartan (COZAAR) 50 mg tablet Comments:   Reason for Stopping:         metoprolol tartrate (LOPRESSOR) 25 mg tablet Comments:   Reason for Stopping:         furosemide (LASIX) 20 mg tablet Comments:   Reason for Stopping:         gabapentin (NEURONTIN) 300 mg capsule Comments:   Reason for Stopping:         solifenacin (VESICARE) 10 mg tablet Comments:   Reason for Stopping:               * Follow-up Care/Patient Instructions: Activity: Bedrest  Diet: Comfort feeding  Wound Care: None needed    Follow-up Information     Follow up With Specialties Details Why Contact Rosita Blount MD Family Medicine In 2 days  [facility] [id number]  [phone] [address] [facility] EMERGENCY DEPT Emergency Medicine  As needed, If symptoms worsen; or [facility] Emergency Department [address] [place] [address]  [phone]        Follow-up Appointments   Procedures    FOLLOW UP VISIT Appointment in: Other ([facility]) Hospice to follow this patient     Hospice to follow this patient     Standing Status:   Standing     Number of Occurrences:   1     Order Specific Question:   Appointment in     Answer: Other (Specify)     Total time of discharge: 35 minutes    Disclaimer: Sections of this note are dictated using utilizing voice recognition software, which may have resulted in some phonetic based errors in grammar and contents. Even though attempts were made to correct all the mistakes, some may have been missed, and remained in the body of the document. If questions arise, please contact our department.       Signed:  Catalina Byrne MD  10/6/2021

## 2021-10-05 NOTE — PROGRESS NOTES
Transport has been arranged to  pt at 4:30pm to home  Called pt's alexandrkathi Chandler Joel and she stated everything is ready at home including hospital bed. Updated Dawit Herrera of [facility]. She stated pt's medications are ready in the pharmacy. Updated pt's nurse Casandra Romero.               MORIAH DiegoN RN  Care Management  Pager: [phone]

## 2021-10-06 NOTE — PROGRESS NOTES
Bernadette Holman 1266: [phone])  [facility]: [phone]     Patient Name: Ayanna Alfredo  YOB: 1934    Date of Progress Note: 10/6/2021   Reason for Consult: establish goals of care  Requesting Provider: Nu Diop MD   Primary Care Physician: Saúl Franklin MD      SUMMARY:   Ayanna Alfredo is a 80 y.o. female with a past history of dementia, DM2, Hep C, who was admitted on 9/29/2021 from [facility] with a diagnosis of AMS and decreased nutritional intake. Current medical issues leading to Palliative Medicine involvement include: establish goals of care. CHIEF COMPLAINT: hypothermia and sinus bradycardia    HPI/SUBJECTIVE:    Patient is an 80-year-old -American female who came in from [facility] with altered mental status times several days, dehydration and poor appetite. She had recently lost her son about 4 months ago and family reports that she has been declining since then. Patient has some level of Alzheimer's dementia at baseline but had been fairly dependent with her ADLs and somewhat able to communicate, she recently has had several falls at the nursing home. While hospitalized patient developed sinus bradycardia and hypoglycemia. She became hypothermic and mental status continued to decline. 10/6/21:  Pt remains somewhat alert opening eyes to verbal stimulation. Also continues Kussmaul breathing pattern    10/5/21:  Patient slightly more responsive this morning. Nodded no when asked if she is in pain, nodded yes when asked if she knew where she was. 10/4/2021 poorly responsive this am, spoke with her nephew Mr Dania Sesay who was speaking on behalf of the family, they wish to move to comfort measures. 10/1/21:  Pt continues to have frequent periods of sinus pauses and hypothermia.  Remains on the warming blanket and external pacer/fibulation    The patient is:   [] Verbal and participatory  [x] Non-participatory due to:  Minimally responsive, Dementia at baseline     GOALS OF CARE:  Patient/Health Care Proxy Stated Goals: Comfort      TREATMENT PREFERENCES:   Code Status: DNR/ DNI          PALLIATIVE DIAGNOSES:   1. Goals of care/ACP  2. Bradycardia  3. Altered mental status  4. Debility       PLAN:   10/6/21:  Plan is for patient home today with hospice services. Being set up through case management. This was delayed from yesterday due to inclement weather. This NP saw patient at the bedside she does not appear to be in any distress. Continues Kussmaul breathing pattern and will open eyes to verbal stimulation. Patient remains DO NOT RESUSCITATE DO NOT INTUBATE on comfort care  10/5/2021: Patient seen by this NP and RAFI Curtis. While still poorly responsive she did alert to her name and light touch. Able to track with her eyes and nodded yes and no unsure if appropriately. No family at the bedside this morning. She does not appear at this time to be in distress. When asked if she was in pain she nodded no. Noted Kussmaul breathing pattern. Patient also exhibits blowing bubbles. Continued poor p.o. intake, not on oxygen at this time. Family continue to want to take patient home with hospice if she is stable for transport  Patient is a DO NOT RESUSCITATE DO NOT INTUBATE/comfort care only    10/4/2021 Patient seen along with Ms Tomlinsonmarci Melendez, [address] [place] [address]. Patient is poorly responsive, did not alert to her name or light touch. We spoke with her nephew Mr Susan Olmedo who shared with us all family has had an opportunity to see Ms Smitha Constantino. They wish to transition to comfort measures. Family understands this means stopping external pacer, no longer using [employer], stopping all aggressive treatments, including blood draws, x rays, any IV aggressive medications. Comfort order set in place. Goals of care DNR/DNI, comfort measures, no feeding tubes. Will need POST for comfort prior to d/c.  Offered support to the family in this most difficult time. BSRN and attending aware of care decisions. ( please see below for previous notes per palliative team)       10/1/21: This NP in to see patient with Alexander Del Valle RN pt is not responsive and appears in mild to moderate distress with facial grimacing. Family coming in today and our team will reach out to support for ongoing discussions about escalation of care and continuation of current treatments which are serving only to prolong her dying process. At this time patient remains a DNR/DNI POST completed and on file   Addendum: In the afternoon this NP and Alexander Del Valle RN in to see patient at the bedside she was accompanied by her niece in law Michele Dickinson and other family member. Discussed with family my conversation with the cardiologist regarding inability to escalate care for pacemaker placement. Also discussed the burden of external pacing and frequency of patient being shocked by this process. They are understanding that there is the chance that patient may not be able to endure this treatment and it could be prolonging her suffering. Ginette Randhawa at this time was not prepared to move patient to comfort care but understands that if the external pacer becomes more burdensome cardiology may make the decision to turn it off. She is in agreement with this decision. This was also discussed with Karon Romberg MD who will be here this weekend in the event that family makes the decision to move the patient to comfort care. 1.   Goals of care/ACP  This NP in to see the patient at the bedside she was accompanied by her [facility] and niece in law Michele Dickinson who is also reported to be her secondary MPOA. She states that her  is the patient's nephew and the primary MPOA, Enrique Foley. Discussion regarding burdens and benefits of ongoing treatments, CPR and intubation.  Mrs Arlyn Clancy expressed that she would not want to see her aunt go through any suffering if it were not a road to recovery. We then contacted several other family members including her  and I explained the current situation and reiterated the burdens of CPR and intubation. They were all in agreement that when her natural time comes and her heart stops to let her go and do not attempt CPR or place her on a ventilator. A POST was completed for DNR/DNI limited interventions. I did not address feeding tubes at this time. 2.   Bradycardia  Pt developed frequent prolonged pauses and cardiology placed her on an external defibrillation device. 3.   Altered mental status  Minimally responsive which is a change from her baseline dementia status. 4.   Debility  Current PPS is around 20% patient is bed bound, unable to do or interact in any meaningful way, requires total care for all ADL's, minimal to no oral intake. She has a very poor short and long term prognosis. 5.  Initial consult note routed to primary continuity provider  6. Communicated plan of care with: Palliative IDT      Advance Care Planning:  [] The [facility] Interdisciplinary Team has updated the ACP Navigator with [address] and Patient Capacity    Primary Decision Maker ([address]): Familia rocha)    Medical Interventions: Comfort measures   Other Instructions:   Artificially Administered Nutrition: No feeding tube     As far as possible, the palliative care team has discussed with patient / health care proxy about goals of care / treatment preferences for patient.          HISTORY:     History obtained from: Chart review and family interview   Active Problems:    Sepsis ([address] (9/29/2021)      Sinus pause (9/30/2021)      Severe protein-calorie malnutrition ([address] (10/1/2021)      Past Medical History:   Diagnosis Date    Anxiety     Arthritis     Dementia ([address]     Diabetes ([address]     Edema of extremities     Falls     Hematologic disorder     Hepatitis C carrier ([address]     Hypercholesterolemia     Hypertension     Insomnia     Labyrinthitis     Menopause     Primary osteoarthritis of knees, bilateral     Urge incontinence     Vitamin D deficiency       Past Surgical History:   Procedure Laterality Date    HX HYSTERECTOMY  1980    HX OTHER SURGICAL  2007    Lap Band for weight loss      Family History   Problem Relation Age of Onset    Breast Cancer Sister 80    Ovarian Cancer Sister 33        33s     History reviewed, no pertinent family history.   Social History     Tobacco Use    Smoking status: Never Smoker    Smokeless tobacco: Never Used   Substance Use Topics    Alcohol use: No     Allergies   Allergen Reactions    Contrast Dye [Iodine] Other (comments)     Neurological reaction      Current Facility-Administered Medications   Medication Dose Route Frequency    hydrALAZINE (APRESOLINE) 20 mg/mL injection 10 mg  10 mg IntraVENous Q6H PRN    LORazepam (INTENSOL) 2 mg/mL oral concentrate 0.5 mg  0.5 mg SubLINGual Q4H PRN    hyoscyamine SL (LEVSIN/SL) tablet 0.125 mg  0.125 mg SubLINGual Q4H PRN    scopolamine (TRANSDERM-SCOP) 1 mg over 3 days 1 Patch  1 Patch TransDERmal Q72H PRN    glycopyrrolate (ROBINUL) injection 0.2 mg  0.2 mg IntraVENous Q4H PRN    morphine (ROXANOL) 100 mg/5 mL (20 mg/mL) concentrated solution 5 mg  5 mg SubLINGual Q1H PRN    sodium chloride (NS) flush 5-40 mL  5-40 mL IntraVENous Q8H    sodium chloride (NS) flush 5-40 mL  5-40 mL IntraVENous PRN    acetaminophen (TYLENOL) tablet 650 mg  650 mg Oral Q6H PRN    Or    acetaminophen (TYLENOL) suppository 650 mg  650 mg Rectal Q6H PRN    polyethylene glycol (MIRALAX) packet 17 g  17 g Oral DAILY PRN    albuterol-ipratropium (DUO-NEB) 2.5 MG-0.5 MG/3 ML  3 mL Nebulization Q4H PRN          Clinical Pain Assessment (nonverbal scale for nonverbal patients): Clinical Pain Assessment  Severity: 0     Activity (Movement): Lying quietly, normal position    Duration: for how long has pt been experiencing pain (e.g., 2 days, 1 month, years)  Frequency: how often pain is an issue (e.g., several times per day, once every few days, constant)     FUNCTIONAL ASSESSMENT:     Palliative Performance Scale (PPS):  PPS: 30    ECOG  ECOG Status : Completely disabled     PSYCHOSOCIAL/SPIRITUAL SCREENING:      Any spiritual / Pentec[employer]alism concerns:  [] Yes /  [x] No  10/4/2021 unable to assess for patient   Caregiver Burnout:  [] Yes /  [x] No /  [] No Caregiver Present      Anticipatory grief assessment:   [x] Normal  / [] Maladaptive     10/4/2021 unable to assess for patient    REVIEW OF SYSTEMS:     Systems: constitutional, ears/nose/mouth/throat, respiratory, gastrointestinal, genitourinary, musculoskeletal, integumentary, neurologic, psychiatric, endocrine. Positive findings noted below. Modified ESAS Completed by: provider           Pain: 0           Dyspnea: 0               Positive and pertinent negative findings in ROS are noted above in HPI. The following systems were [] reviewed / [x] unable to be reviewed as noted in HPI  Other findings are noted below. PHYSICAL EXAM:     Constitutional: poorly responsive this am, appears comfortable in NAD   Eyes: closed   Cardiovascular: RRR, not currently paced   Respiratory: respirations shallow  Neurologic: poorly responsive this am     Other: Wt Readings from Last 3 Encounters:   09/30/21 78.5 kg (173 lb)   07/07/21 78.5 kg (173 lb)   06/29/21 85.7 kg (189 lb)     Blood pressure (!) 216/138, pulse 76, temperature (!) 96.5 °F (35.8 °C), resp. rate 24, height 5' 4\" (1.626 m), weight 78.5 kg (173 lb), SpO2 98 %.   Pain:  Pain Scale 1: Adult Nonverbal Pain Scale  Pain Intensity 1: 0              Pain Intervention(s) 1: Medication (see MAR)       LAB AND IMAGING FINDINGS:     Lab Results   Component Value Date/Time    WBC 4.5 (L) 10/01/2021 05:40 AM    HGB 10.1 (L) 10/01/2021 05:40 AM    PLATELET 529 88/70/0896 05:40 AM     Lab Results   Component Value Date/Time    Sodium 145 10/01/2021 05:40 AM Potassium 3.9 10/01/2021 05:40 AM    Chloride 116 (H) 10/01/2021 05:40 AM    CO2 22 10/01/2021 05:40 AM    BUN 21 (H) 10/01/2021 05:40 AM    Creatinine 1.60 (H) 10/01/2021 05:40 AM    Calcium 8.6 10/01/2021 05:40 AM    Magnesium 2.1 10/01/2021 05:40 AM    Phosphorus 3.5 10/01/2021 05:40 AM      Lab Results   Component Value Date/Time    Alk. phosphatase 64 09/30/2021 02:15 PM    Protein, total 6.7 09/30/2021 02:15 PM    Albumin 2.6 (L) 09/30/2021 02:15 PM    Globulin 4.1 (H) 09/30/2021 02:15 PM     Lab Results   Component Value Date/Time    INR 1.0 09/29/2021 10:45 AM    Prothrombin time 12.7 09/29/2021 10:45 AM      No results found for: IRON, FE, TIBC, IBCT, PSAT, FERR   No results found for: PH, PCO2, PO2  No components found for: [place]   Lab Results   Component Value Date/Time    CK 30 10/01/2021 05:40 AM    CK - MB 1.6 10/01/2021 05:40 AM              Total time: 15 minutes   Counseling / coordination time, spent as noted above:   > 50% counseling / coordination:  Time spent in direct consultation with the patient, medical team, and family     Prolonged service was provided for  []30 min   []75 min in face to face time in the presence of the patient, spent as noted above. Time Start:   Time End:     Disclaimer: Sections of this note are dictated using utilizing voice recognition software, which may have resulted in some phonetic based errors in grammar and contents. Even though attempts were made to correct all the mistakes, some may have been missed, and remained in the body of the document. If questions arise, please contact our department.

## 2021-10-06 NOTE — ROUTINE PROCESS
Received a call from pt's family member Charlane Siemens) stating that she was upset that transport had not arrived to pick pt up yet. Family member asking for transport to be cancelled and stating pt \"can just stay there another night and come home tomorrow\". She states \"I'm going to say that the delay is due to racism and that ya'll are holding her longer because she is black\". Explained to Ms. Foley that transport companies may be delayed due to emergencies etc, and that nurse has made several calls to transport company to request updates. Family member also making statements such as \"nurse giving me different names like Mylene Hoffman and Kb\". I explained to Ms. Foley that Hollie Lainez was pt's daytime nurse and Mylene Hoffman is pt's night nurse. Ms. Nereyda Isabel later called back and apologized and stated she misunderstood and \"I'm not really mad\".  Pt continues to await transport company arrival.

## 2021-10-06 NOTE — PROGRESS NOTES
Had extensive conversations with patients [address] regarding transportation issues from day prior.  and leader from department both in to speak with patient about plans for transport today. Plan obtained for today's transport to the satisfaction of the POA. [address] also requested assistance from financial department regarding prior patient bills. \"Donte\" from patient access in to speak with POA but POA not present. Amisha Branden given phone number to call to get in touch with her. Mimi Olvera made aware to expect call from patient access representative.  At the end of the service recovery period, all questions were answered with what seemed to be to the  satisfaction of the POA's request.

## 2021-10-06 NOTE — PROGRESS NOTES
Hospitalist Progress Note    Subjective:   Daily Progress Note: 10/6/2021     Patient is lying in bed in NAD. No new issues overnight. Patient could not go home yesterday as medical transportation did not come to pick her up. Current Facility-Administered Medications   Medication Dose Route Frequency    hydrALAZINE (APRESOLINE) 20 mg/mL injection 10 mg  10 mg IntraVENous Q6H PRN    LORazepam (INTENSOL) 2 mg/mL oral concentrate 0.5 mg  0.5 mg SubLINGual Q4H PRN    hyoscyamine SL (LEVSIN/SL) tablet 0.125 mg  0.125 mg SubLINGual Q4H PRN    scopolamine (TRANSDERM-SCOP) 1 mg over 3 days 1 Patch  1 Patch TransDERmal Q72H PRN    glycopyrrolate (ROBINUL) injection 0.2 mg  0.2 mg IntraVENous Q4H PRN    morphine (ROXANOL) 100 mg/5 mL (20 mg/mL) concentrated solution 5 mg  5 mg SubLINGual Q1H PRN    sodium chloride (NS) flush 5-40 mL  5-40 mL IntraVENous Q8H    sodium chloride (NS) flush 5-40 mL  5-40 mL IntraVENous PRN    acetaminophen (TYLENOL) tablet 650 mg  650 mg Oral Q6H PRN    Or    acetaminophen (TYLENOL) suppository 650 mg  650 mg Rectal Q6H PRN    polyethylene glycol (MIRALAX) packet 17 g  17 g Oral DAILY PRN    albuterol-ipratropium (DUO-NEB) 2.5 MG-0.5 MG/3 ML  3 mL Nebulization Q4H PRN        Review of Systems  Review of systems not obtained due to patient factors.     Objective:     Visit Vitals  BP (!) 216/138 (BP 1 Location: Right upper arm, BP Patient Position: At rest;Lying right side)   Pulse 76   Temp (!) 96.5 °F (35.8 °C)   Resp 24   Ht 5' 4\" (1.626 m)   Wt 78.5 kg (173 lb)   SpO2 98%   BMI 29.70 kg/m²      O2 Device: None (Room air)    Temp (24hrs), Av.4 °F (35.8 °C), Min:96.3 °F (35.7 °C), Max:96.5 °F (35.8 °C)      10/06 07 - 10/06 1900  In: -   Out: 35 [Urine:35]  10/04 190 - 10/06 0700  In: 325 [I.V.:325]  Out: 125 [Urine:125]    General appearance -nonverbal, eyes open on verbal/tactile commands, not in acute distress  Chest -diminished air entry noted in bases, no wheezes. Heart - S1 and S2 normal.  Abdomen - soft, nontender, nondistended, Bowel sounds present  Neurological -opens eyes on verbal/tactile commands  Extremities - no pitting pedal edema noted    Data Review    No results found for this or any previous visit (from the past 24 hour(s)). Assessment/Plan:     Active Problems:    Sepsis ([facility] (9/29/2021)      Sinus pause (9/30/2021)      Severe protein-calorie malnutrition ([facility] (10/1/2021)    ASSESSMENT:    1. Acute on chronic metabolic encephalopathy, stable. 2.  Chronic encephalopathy due to Alzheimer's dementia  3. Intermittent complete heart block. 4.  Failure to thrive and poor appetite   5. Hypertension  6. Dyslipidemia  7. Moderate emphysema  8. Diabetes mellitus  9. Right thyroid nodule  10. Hypothermia  11. Poor functional and nutritional status    PLAN:    Continue comfort measures  Discharge patient home with hospice once arrangements are made  Discussed with case management about it. Total time to take care of this patient was 25 minutes and more than 50% of time was spent counseling and coordinating care. Disclaimer: Sections of this note are dictated using utilizing voice recognition software, which may have resulted in some phonetic based errors in grammar and contents. Even though attempts were made to correct all the mistakes, some may have been missed, and remained in the body of the document. If questions arise, please contact our department.

## 2021-10-06 NOTE — PROGRESS NOTES
Noted that transport did not show up to pick pt yesterday. Spoke with Erika Davies of [facility]  Contacted Case Management specialists to arrange for transport as soon as possible. Per pt's niece Lizzy Rm, she is not happy that transport could not take pt home yesterday and Case Management could not find another alternative to transport pt home. This Daphne Mendoza, Case Management Leader and Pérez Maharaj, CVT Manager met with Mrs Mercy Guillermo and explained that since pt had Medicaid, we have to call Medicaid to arrange transport to take pt home. She insisted that [facility] can also arrange transport to take pt home. The team answered all her questions and she expressed understanding. Case Management Specialist Bi Boron called [facility] and was told we have to go through [employer]. 1300: Transport showed up to  pt. Mrs Mercy Guillermo expressed that she likes this hospital and appreciated the team and apologized concerning the transport issues.      Ignacia Nichole, MORIAHN RN  Care Management  Pager: [phone]

## 2021-10-06 NOTE — PROGRESS NOTES
1707: Bedside and Verbal shift change report given to writer by Beba De Leon. Report included the following information OR Summary, Procedure Summary, Intake/Output, MAR, Accordion, Recent Results, Med Rec Status, Cardiac Rhythm ., Alarm Parameters , Quality Measures and Dual Neuro Assessment. Opportunity for questions and clarification was provided. Assessment completed upon patients arrival to unit and care assumed. 6120: ABIDA Flores arrived during nurse shift change- spoke to Beba De Leon, escorted to CVT waiting room. Marlenekeo Laurie asked to speak with nursing supervisor, [facility] notified and stated will come to talk with Cedric Sullivan. 0800: Cedric Sullivan at pt bedside, verbalized apology regarding her \"outburst\" yesterday-stated this was all related to lack of follow through with patient transport of patient to her home. Nursing supervisor spoke with Cedric Sullivan- awaiting patient transport time to her home. 5103: levsin given Sublingual for increased salivation, oral care given prior to medication administration. 4535: Natalia Alpers,  will arrange patient transport and will visit Cedric Sullivan in pts' room to relay transport time and arrangements. Hospice Nurse [phone] to be notified when pt is transferred home. 1310: [facility] services at bedside for transport of patient to home for [address] [facility] with Hospice, Cedric Sullivan left with patient. Hospice caregivers notified pt left for home. Hospice medications of liquid Ativan, Liquid morhine and levsin given to ambulance attendants of [facility] to deliver to [address] [place] [address] who will receive pt at her home with [address].

## 2021-10-06 NOTE — PROGRESS NOTES
Call made to [facility] keepers Access to Care [phone], spoke with Orquidea Mccartney, made Orquidea Mccartney aware that transportation did not show up yesterday, Orquidea Mccartney [facility] stated that the family cancelled transportation due to the rain, and patient may get pneumonia. Orquidea Mccartney rescheduled trip,  12:30 pm.  Trip # [id number].

## 2021-10-07 NOTE — HOSPICE
Hospice Admission Summary  Mrs. Josias Bingham, 80year old male/female, admitted to Hospice services for a terminal diagnosis of Alzheimer's Dementia . Patient has elected hospice services and is no longer seeking aggressive treatment. Co-morbidities related to the terminal diagnosis are Acute Kidney Injury Failure to thrive (Albumin 2.6). Patient also has a past medical history of Hypertension. The patient/family/caregiver  is present and willing and safely able to provide care and administer medications, their availability is daily 24/7  The patient/family/caregiver/facility participated in goal setting, care planning, and are agreeable to the care plan. Admission booklet reviewed with patient/family/caregiver/facility; services provided under hospice benefit, review of rights and responsibilities, disposal of medications, contact information for , Joint Commission, Medicare, O, and outside resources for independence. The patient/family/caregiver/facility educated on IDT and their right to attend meetings. Education provided regarding 24-hour availability of hospice services and on-call number provided. Attending physician:  Dr Yulia Hackett Director:  Dr Yesenia Arteaga of Care:  Routine  Advance Directives:  POST/ DNR  Allergies:  Iodine  MAC:  36 cm  Height:  5'4\"  Weight:  173 lb  PPS:  20 %  FAST:  7F  NYHA:  N/A   EF%:  N/A   Tobacco usage:  N/A,  Functional status:  Ambulate Unable                              Transfer   Unable                              Bathing    CG and HHA provide bedbath                              Bressing   dependent on CG                               Feeding    Dependent, needs full assistance                               Toileting    Incontinent of both urine and feaces    Infection:  Sepsis? source  Pain:   mild,  -  medications include Morphine 0.25 ml to 1 ml  Bowel Regimen:  Dulcolax supp  Lines, Drains, or Airways present:    Huggins catheter, See Care Plan for Intervention Orders  Wounds present:  NO, See wound care plan for intervention orders  Symptoms to monitor to maintain comfort:  Terminal secretions  Hospice Aide Services Requested:  2 X weekly  MSW Requested: Yes   Requested: Yes  Volunteer Services Requested:  NO  Bereavement risk/contact:  Low  Patient/family/caregiver specific end of life goal:  Family goal is that patient remains comfortable throughout the dying process  Training and education provided this visit:  Hospice orientation  Plan for next visit:  Safety  Care coordination with Medical Director, IDG, and Caregiver regarding admission to hospice and all are in agreement with plan of care.

## 2021-10-08 NOTE — HOSPICE
The following standard precautions for infection control were utilized:  Mask   performed initial assessment and provided pastoral care.  provided listening presence and discussed patient spiritual care needs. Family shared/discussed their fears. Family accepted visits 1x monthly with PRN visits schedules as needed.

## 2021-10-09 NOTE — HOSPICE
Entered pt home and observed pt resting in bed. Pt noted to be fidgiting and pulling at blanket. Last dose of Lorazepam given night before. 0.50mL given to pt for this agitation. Pt showed improvement but towards the end of the visit noted pt grimicing and respirations increasing. Administered 0.25mL of morphine and pt resting comfortably on exiting home. During visit answered questions about signs and sx of this stage, how to medicate for symptoms, and family verbalized back education. Medication log being used and pt had been medicated w/ Levsin and morphine this AM. Secretions being controlled and educated on repositioning to help w/ getting secretions to drain out of mouth. Pt had BM this AM and catheter patent draining straw coloured urine well. Bag was 1/4th full. Pt sleeping throughout majority of the day and would open eyes and smile at family members once or twice during visit for a few seconds. Pt responsive to physical stimului. Pt taking in nothing orally at this time. No supplies needed no medications needed at this time. No further questions or concerns. Exited home.

## 2021-10-10 NOTE — HOSPICE
Patient resting on hospital bed, minimally responsive to tactile stimuli. Noted increased effort in respirations and pursed lip breathing. Secretions audible. Administered 0.25mL morphine and 1 tablet of hyoscyamine. Family instructed on medication administration and indications, provided with medication log. Family educated on end of life symptoms, nonverbal s/s of agitation, pain, shortness of breath and verbalize understanding.      Ordered:  mouth swabs  barrier cream zinc paste  wipes 2 boxes  chucks   large briefs  hand

## 2021-10-10 NOTE — HOSPICE
Pt received in bed, nonresponsive to physical stimuli. CG reports pt ate this morning and requested a back scratch, which is unusual as pt has been non-verbal. No needs expressed during visit. 24 hour hospice number reinfroced.

## 2021-10-11 NOTE — HOSPICE
Patient resting on hospital bed responsive only to painful stimuli, noted increased work of breathing and audible secretions. Administered hyoscyamine and morphine per orders and positioned patient on her side to allow secretions to come out of the mouth. Patient placed on 2L of oxygen for comfort. Family verbalizes understanding of medications and indications. Family verbalizes understanding of end of life signs/symptoms and processes. Support provided to family and patient resting comfortably upon leaving. denies

## 2021-10-12 NOTE — HOSPICE
Pronouncement of death completed by: Migdalia Hayward RN. Agency staff was not present at the time of death. At the time of death the patient was documented as  per [facility] death pronouncement (apneic, pulseless, blood pressure absent). The pt  within home. The following were notified of the patient's death: [facility] staff and family present. Medications were disposed of per [facility] protocol and form documenting disposal and name of witness for medication disposal completed. Post mortem care completed with assistance from the family. Patient only wearing a gown to the  home, no other belongings. Huggins catheter removed and disposed of.
